# Patient Record
Sex: FEMALE | Race: WHITE | NOT HISPANIC OR LATINO | Employment: FULL TIME
[De-identification: names, ages, dates, MRNs, and addresses within clinical notes are randomized per-mention and may not be internally consistent; named-entity substitution may affect disease eponyms.]

---

## 2017-03-21 ENCOUNTER — HOSPITAL (OUTPATIENT)
Dept: OTHER | Age: 37
End: 2017-03-21
Attending: INTERNAL MEDICINE

## 2018-08-29 ENCOUNTER — HOSPITAL (OUTPATIENT)
Dept: OTHER | Age: 38
End: 2018-08-29
Attending: FAMILY MEDICINE

## 2018-08-29 LAB
FREE T3: 2.9 PG/ML (ref 2.2–4)
FREE T4: 0.8 NG/DL (ref 0.8–1.5)
THYROGLOB AB SERPL-ACNC: <0.9 UNIT/ML (ref 0–4)
THYROPEROXIDASE AB SERPL-ACNC: <28 UNIT/ML
TSH SERPL-ACNC: 0.68 MCUNIT/ML (ref 0.35–5)

## 2019-04-22 ENCOUNTER — MED REC SCAN ONLY (OUTPATIENT)
Dept: RHEUMATOLOGY | Facility: CLINIC | Age: 39
End: 2019-04-22

## 2019-04-22 ENCOUNTER — OFFICE VISIT (OUTPATIENT)
Dept: RHEUMATOLOGY | Facility: CLINIC | Age: 39
End: 2019-04-22
Payer: COMMERCIAL

## 2019-04-22 ENCOUNTER — LAB ENCOUNTER (OUTPATIENT)
Dept: LAB | Age: 39
End: 2019-04-22
Attending: INTERNAL MEDICINE
Payer: COMMERCIAL

## 2019-04-22 VITALS
DIASTOLIC BLOOD PRESSURE: 70 MMHG | HEART RATE: 82 BPM | BODY MASS INDEX: 34.04 KG/M2 | WEIGHT: 185 LBS | SYSTOLIC BLOOD PRESSURE: 112 MMHG | HEIGHT: 62 IN

## 2019-04-22 DIAGNOSIS — R21 RASH: ICD-10-CM

## 2019-04-22 DIAGNOSIS — R59.9 SWOLLEN GLAND: ICD-10-CM

## 2019-04-22 DIAGNOSIS — R21 RASH: Primary | ICD-10-CM

## 2019-04-22 DIAGNOSIS — R23.1 LIVEDO RETICULARIS: ICD-10-CM

## 2019-04-22 PROCEDURE — 86255 FLUORESCENT ANTIBODY SCREEN: CPT

## 2019-04-22 PROCEDURE — 85610 PROTHROMBIN TIME: CPT

## 2019-04-22 PROCEDURE — 86704 HEP B CORE ANTIBODY TOTAL: CPT | Performed by: INTERNAL MEDICINE

## 2019-04-22 PROCEDURE — 85025 COMPLETE CBC W/AUTO DIFF WBC: CPT | Performed by: INTERNAL MEDICINE

## 2019-04-22 PROCEDURE — 86200 CCP ANTIBODY: CPT | Performed by: INTERNAL MEDICINE

## 2019-04-22 PROCEDURE — 87340 HEPATITIS B SURFACE AG IA: CPT | Performed by: INTERNAL MEDICINE

## 2019-04-22 PROCEDURE — 86146 BETA-2 GLYCOPROTEIN ANTIBODY: CPT | Performed by: INTERNAL MEDICINE

## 2019-04-22 PROCEDURE — 87389 HIV-1 AG W/HIV-1&-2 AB AG IA: CPT | Performed by: INTERNAL MEDICINE

## 2019-04-22 PROCEDURE — 86160 COMPLEMENT ANTIGEN: CPT | Performed by: INTERNAL MEDICINE

## 2019-04-22 PROCEDURE — 85390 FIBRINOLYSINS SCREEN I&R: CPT

## 2019-04-22 PROCEDURE — 86664 EPSTEIN-BARR NUCLEAR ANTIGEN: CPT

## 2019-04-22 PROCEDURE — 84165 PROTEIN E-PHORESIS SERUM: CPT

## 2019-04-22 PROCEDURE — 85613 RUSSELL VIPER VENOM DILUTED: CPT

## 2019-04-22 PROCEDURE — 86665 EPSTEIN-BARR CAPSID VCA: CPT

## 2019-04-22 PROCEDURE — 86431 RHEUMATOID FACTOR QUANT: CPT | Performed by: INTERNAL MEDICINE

## 2019-04-22 PROCEDURE — 86803 HEPATITIS C AB TEST: CPT | Performed by: INTERNAL MEDICINE

## 2019-04-22 PROCEDURE — 36415 COLL VENOUS BLD VENIPUNCTURE: CPT

## 2019-04-22 PROCEDURE — 82595 ASSAY OF CRYOGLOBULIN: CPT

## 2019-04-22 PROCEDURE — 86706 HEP B SURFACE ANTIBODY: CPT | Performed by: INTERNAL MEDICINE

## 2019-04-22 PROCEDURE — 85598 HEXAGNAL PHOSPH PLTLT NEUTRL: CPT

## 2019-04-22 PROCEDURE — 83516 IMMUNOASSAY NONANTIBODY: CPT

## 2019-04-22 PROCEDURE — 99244 OFF/OP CNSLTJ NEW/EST MOD 40: CPT | Performed by: INTERNAL MEDICINE

## 2019-04-22 PROCEDURE — 86147 CARDIOLIPIN ANTIBODY EA IG: CPT

## 2019-04-22 PROCEDURE — 83876 ASSAY MYELOPEROXIDASE: CPT

## 2019-04-22 PROCEDURE — 86663 EPSTEIN-BARR ANTIBODY: CPT

## 2019-04-22 PROCEDURE — 80053 COMPREHEN METABOLIC PANEL: CPT | Performed by: INTERNAL MEDICINE

## 2019-04-22 PROCEDURE — 85730 THROMBOPLASTIN TIME PARTIAL: CPT

## 2019-04-22 RX ORDER — IBUPROFEN 200 MG
TABLET ORAL AS NEEDED
COMMUNITY
End: 2021-09-13 | Stop reason: ALTCHOICE

## 2019-04-22 RX ORDER — RANITIDINE 300 MG/1
TABLET ORAL
Refills: 2 | COMMUNITY
Start: 2019-02-26 | End: 2020-10-27 | Stop reason: ALTCHOICE

## 2019-04-22 NOTE — PATIENT INSTRUCTIONS
- you were seen today for rash on legs and swollen glands  - plan to get blood work to r/o anything autoimmune  - will let you know the results

## 2019-11-08 ENCOUNTER — HOSPITAL (OUTPATIENT)
Dept: OTHER | Age: 39
End: 2019-11-08
Attending: OBSTETRICS & GYNECOLOGY

## 2020-08-19 NOTE — PROGRESS NOTES
Randy Gastelum is a 46year old female who presents for Patient presents with:  Joint Pain: knees, wrists, neck  Skin: mottling of legs x 2 weeks   Pain: facial pain   .    HPI:     I had the pleasure of seeing Randy Gastelum on 4/22/2019 for evaluation of Medications:  Dextran 70-Hypromellose (ARTIFICIAL TEARS) 0.1-0.3 % Ophthalmic Solution 1 gtt. each eye as needed Disp:  Rfl:    LOPERAMIDE HCL OR Take by mouth as needed. Disp:  Rfl:    ibuprofen 200 MG Oral Tab as needed.  Disp:  Rfl:    raNITIdine HCl 300 biopsy   • OTHER SURGICAL HISTORY  8/19/14    Right ESWL- Dr. Temple Means   • OTHER SURGICAL HISTORY  9/2/14    Right ESWL- Dr. Murphy Argue   • OTHER SURGICAL HISTORY  9/8/14    [facility]      Family History   P rash on LE  MSK:  Cervical spine: FROM  Hands: no synovitis in DIP, PIP and MCP, strong full fists  Wrist: FROM, no pain or swelling or warmth on palpation  Elbow: FROM, no pain or swelling or warmth on palpation  Shoulders: FROM, no pain or swelling or wa Autoimmune workup will be done but does not appear to be an autoimmune process    Thank you for allowing me to participate in this patients care.  Pt will be notified of results, f/u in 3 months     Paras Willis MD  4/22/2019  10:47 AM [Awake] : awake [Alert] : alert [Examination Of The Breasts] : a normal appearance [No Masses] : no breast masses were palpable [No Discharge] : no discharge [Soft] : soft [Oriented x3] : oriented to person, place, and time [Labia Majora] : labia major [Normal] : clitoris [Labia Minora] : labia minora [No Bleeding] : there was no active vaginal bleeding [No Tenderness] : no rectal tenderness [Nl Sphincter Tone] : normal sphincter tone [Pap Obtained] : a Pap smear was performed [Acute Distress] : no acute distress [Mass] : no breast mass [Nipple Discharge] : no nipple discharge [Axillary LAD] : no axillary lymphadenopathy [Tender] : non tender [Distended] : not distended [Depressed Mood] : not depressed [Flat Affect] : affect not flat [Tenderness] : nontender [Occult Blood] : occult blood test from digital rectal exam was negative [Adnexa Tenderness] : were not tender

## 2020-08-25 NOTE — H&P
[facility], [facility] - Gastroenterology                                                                                                               Reason for consult: crc screening    Requesting physician or provider: [facility] 2/2 underdistention vs inflammatory process. Normal appendix.  S/p ccy    Unremarkable cbc, cmp, lipase ua in er     NSAIDS: no  Tobacco: current smoker  Alcohol: social  Illicit drugs:no    Maternal grandmother had colon ca in her 42's  No FH ibd  No FH ce CYSTOSCOPY, STENT PLACEMENT Right 8/19/2014    Performed by Lila Portillo DO at [address] [place] [address]   • HOLMIUM  LITHOTRIPSY LASER WITH CYSTOSCOPY Right 9/2/2014    Performed by Lila Portillo DO at [address] [place] [address]   • ADOLFO Taylor SWELLING  Clarithromycin          ANGIOEDEMA, SWELLING, TONGUE                            SWELLING    Comment:Tongue swells and develops sores in mouth             Tongue swells             Tongue swells and develops sores in mouth  Duloxetine AM   WBC  4.0 - 11.0 x10(3) uL 6.8    RBC  3.80 - 5.30 x10(6)uL 5.07    HGB  12.0 - 16.0 g/dL 15.2    HCT  35.0 - 48.0 % 46.1    MCV  80.0 - 100.0 fL 90.9    MCH  26.0 - 34.0 pg 30.0    MCHC  31.0 - 37.0 g/dL 33.0    RDW-SD  35.1 - 46.3 fL 46.2    RDW  11. consider underlying nonspecific inflammatory (such as Crohn's disease) and/or infectious process. 3.  Other findings, as above.   .  ASSESSMENT/PLAN:   Willow Schneider is a 45year old year-old female with history of thyroid nodule, abd pain, anxiety, cer 8/26/2020        This note was partially prepared using [employer] voice recognition dictation software. As a result, errors may occur. When identified, these errors have been corrected.  While every attempt is made to correct errors during dictation,

## 2020-08-26 ENCOUNTER — OFFICE VISIT (OUTPATIENT)
Dept: GASTROENTEROLOGY | Facility: CLINIC | Age: 40
End: 2020-08-26
Payer: COMMERCIAL

## 2020-08-26 ENCOUNTER — LAB ENCOUNTER (OUTPATIENT)
Dept: LAB | Facility: HOSPITAL | Age: 40
End: 2020-08-26
Attending: NURSE PRACTITIONER
Payer: COMMERCIAL

## 2020-08-26 VITALS
SYSTOLIC BLOOD PRESSURE: 114 MMHG | HEART RATE: 88 BPM | DIASTOLIC BLOOD PRESSURE: 86 MMHG | WEIGHT: 186 LBS | BODY MASS INDEX: 34 KG/M2

## 2020-08-26 DIAGNOSIS — R10.84 GENERALIZED ABDOMINAL PAIN: ICD-10-CM

## 2020-08-26 DIAGNOSIS — R93.5 ABNORMAL CT OF THE ABDOMEN: ICD-10-CM

## 2020-08-26 DIAGNOSIS — Z86.010 HISTORY OF COLON POLYPS: ICD-10-CM

## 2020-08-26 DIAGNOSIS — K21.9 GASTROESOPHAGEAL REFLUX DISEASE, ESOPHAGITIS PRESENCE NOT SPECIFIED: ICD-10-CM

## 2020-08-26 DIAGNOSIS — R10.84 GENERALIZED ABDOMINAL PAIN: Primary | ICD-10-CM

## 2020-08-26 LAB
CRP SERPL-MCNC: 0.37 MG/DL (ref ?–0.3)
ERYTHROCYTE [SEDIMENTATION RATE] IN BLOOD: 7 MM/HR (ref 0–20)
VIT B12 SERPL-MCNC: 384 PG/ML (ref 193–986)

## 2020-08-26 PROCEDURE — 3079F DIAST BP 80-89 MM HG: CPT | Performed by: NURSE PRACTITIONER

## 2020-08-26 PROCEDURE — S0285 CNSLT BEFORE SCREEN COLONOSC: HCPCS | Performed by: NURSE PRACTITIONER

## 2020-08-26 PROCEDURE — 82607 VITAMIN B-12: CPT

## 2020-08-26 PROCEDURE — 82306 VITAMIN D 25 HYDROXY: CPT

## 2020-08-26 PROCEDURE — 86140 C-REACTIVE PROTEIN: CPT

## 2020-08-26 PROCEDURE — 36415 COLL VENOUS BLD VENIPUNCTURE: CPT

## 2020-08-26 PROCEDURE — 3074F SYST BP LT 130 MM HG: CPT | Performed by: NURSE PRACTITIONER

## 2020-08-26 PROCEDURE — 85652 RBC SED RATE AUTOMATED: CPT

## 2020-08-26 RX ORDER — HYOSCYAMINE SULFATE 0.12 MG/5ML
5 LIQUID ORAL 3 TIMES DAILY PRN
COMMUNITY
End: 2020-09-16

## 2020-08-26 RX ORDER — RABEPRAZOLE SODIUM 20 MG/1
20 TABLET, DELAYED RELEASE ORAL DAILY
COMMUNITY
Start: 2020-08-12 | End: 2020-10-27 | Stop reason: ALTCHOICE

## 2020-08-26 RX ORDER — ALBUTEROL SULFATE 90 UG/1
2 AEROSOL, METERED RESPIRATORY (INHALATION) EVERY 6 HOURS PRN
COMMUNITY
Start: 2020-01-13

## 2020-08-26 NOTE — PATIENT INSTRUCTIONS
-request colonoscopy and egd from [facility]  -continue rabeprazole 20 mg  -start miralax one capful daily and squatty potty  -labs  -report to er if condition decline

## 2020-08-28 LAB — 25(OH)D3 SERPL-MCNC: 32.5 NG/ML (ref 30–100)

## 2020-09-13 NOTE — PROGRESS NOTES
[facility], [facility] - Gastroenterology                                                                                                               Reason for consult: f/u    Requesting physician or provider: [facility] Encounters:  08/26/20 : 186 lb (84.4 kg)  04/22/19 : 185 lb (83.9 kg)  09/15/14 : 184 lb (83.5 kg)  09/02/14 : 188 lb 6.4 oz (85.5 kg)  08/19/14 : 190 lb (86.2 kg)  08/12/14 : 200 lb (90.7 kg)       History, Medications, Allergies, ROS:      Past Medical H Removal- Dr. Ashley Mcarthur   • TONSILLECTOMY  1990      Family Hx:   Family History   Problem Relation Age of Onset   • Lipids Mother    • Heart Disorder Mother    • Hypertension Mother    • Diabetes Mother       Social History: Social History    Tobacco Use OTHER (SEE COMMENTS), RASH    Comment:Skin ulcer. Skin ulcer. Skin ulcer.   Omeprazole              OTHER (SEE COMMENTS)    Comment:headache  Pantoprazole            OTHER (SEE COMMENTS)  Nickel                  RASH    Comment pain  #h/o colon polyps  #gerd  #dysphagia  She is here today for f/u. Labs w/ mild elevation in crp, normal esr, normal b12. Today is slightly improved since last visit. Cln/EGD 2/2020 requested from [facility], but not yet received.   Recommend she contin

## 2020-09-16 ENCOUNTER — PATIENT MESSAGE (OUTPATIENT)
Dept: GASTROENTEROLOGY | Facility: CLINIC | Age: 40
End: 2020-09-16

## 2020-09-16 ENCOUNTER — TELEPHONE (OUTPATIENT)
Dept: GASTROENTEROLOGY | Facility: CLINIC | Age: 40
End: 2020-09-16

## 2020-09-16 ENCOUNTER — OFFICE VISIT (OUTPATIENT)
Dept: GASTROENTEROLOGY | Facility: CLINIC | Age: 40
End: 2020-09-16
Payer: COMMERCIAL

## 2020-09-16 VITALS
WEIGHT: 185.63 LBS | BODY MASS INDEX: 34.16 KG/M2 | HEART RATE: 78 BPM | HEIGHT: 62 IN | SYSTOLIC BLOOD PRESSURE: 111 MMHG | DIASTOLIC BLOOD PRESSURE: 76 MMHG

## 2020-09-16 DIAGNOSIS — Z86.010 HISTORY OF COLON POLYPS: ICD-10-CM

## 2020-09-16 DIAGNOSIS — R10.84 GENERALIZED ABDOMINAL PAIN: ICD-10-CM

## 2020-09-16 DIAGNOSIS — K21.9 GASTROESOPHAGEAL REFLUX DISEASE, ESOPHAGITIS PRESENCE NOT SPECIFIED: Primary | ICD-10-CM

## 2020-09-16 DIAGNOSIS — R13.10 DYSPHAGIA, UNSPECIFIED TYPE: ICD-10-CM

## 2020-09-16 PROCEDURE — 3078F DIAST BP <80 MM HG: CPT | Performed by: NURSE PRACTITIONER

## 2020-09-16 PROCEDURE — 3008F BODY MASS INDEX DOCD: CPT | Performed by: NURSE PRACTITIONER

## 2020-09-16 PROCEDURE — 99214 OFFICE O/P EST MOD 30 MIN: CPT | Performed by: NURSE PRACTITIONER

## 2020-09-16 PROCEDURE — 3074F SYST BP LT 130 MM HG: CPT | Performed by: NURSE PRACTITIONER

## 2020-09-16 NOTE — PATIENT INSTRUCTIONS
-stop smoking  -aciphex once daily  -miralax once daily with squatty potty  -restart levsin  -swallow study    Request colonoscopy and egd from [facility]

## 2020-09-17 NOTE — TELEPHONE ENCOUNTER
From: Kayla Liao  To: Zohreh Barba  Sent: 9/16/2020 7:05 PM CDT  Subject: Test Results Question    I was able to find the pathology results from my colonoscopy after logging into several different my carts I found it.     I am attaching it,

## 2020-10-13 ENCOUNTER — APPOINTMENT (OUTPATIENT)
Dept: LAB | Age: 40
End: 2020-10-13
Attending: NURSE PRACTITIONER
Payer: COMMERCIAL

## 2020-10-13 DIAGNOSIS — R13.10 DYSPHAGIA, UNSPECIFIED TYPE: ICD-10-CM

## 2020-10-16 ENCOUNTER — HOSPITAL ENCOUNTER (OUTPATIENT)
Dept: GENERAL RADIOLOGY | Facility: HOSPITAL | Age: 40
Discharge: HOME | End: 2020-10-16
Attending: NURSE PRACTITIONER
Payer: COMMERCIAL

## 2020-10-16 DIAGNOSIS — R13.10 DYSPHAGIA, UNSPECIFIED TYPE: ICD-10-CM

## 2020-10-16 PROCEDURE — 92611 MOTION FLUOROSCOPY/SWALLOW: CPT

## 2020-10-16 PROCEDURE — 74230 X-RAY XM SWLNG FUNCJ C+: CPT | Performed by: NURSE PRACTITIONER

## 2020-10-16 NOTE — PROGRESS NOTES
ADULT VIDEOFLUOROSCOPIC SWALLOWING STUDY       ADULT VIDEOFLUOROSCOPIC SWALLOWING STUDY:   Referring Physician: Cliff Garcia      Radiologist: Dr. Gosia Carrillo  Diagnosis: dysphagia    Date of Service: 10/16/2020     PATIENT SUMMARY   Chief Complaint: The patien in lower esophagus and was not cleared with multiple drinks of water. Puree was presented which cleared the barium tablet through the esophagus. Penetration Aspiration Scale: 1/8, No material entered the airway.     Overall Impression: Normal oral and

## 2020-10-19 ENCOUNTER — TELEPHONE (OUTPATIENT)
Dept: GASTROENTEROLOGY | Facility: CLINIC | Age: 40
End: 2020-10-19

## 2020-10-19 DIAGNOSIS — R13.19 ESOPHAGEAL DYSPHAGIA: Primary | ICD-10-CM

## 2020-10-19 NOTE — TELEPHONE ENCOUNTER
Pt contacted after having vfss-testing remarkable for the barium pill retained in the lower esophagus. recommendation for complete esophagram if indicated. CLN/EGD requested from [facility] and not yet received.     Do recommend esophagram pending review of eg

## 2020-10-23 NOTE — TELEPHONE ENCOUNTER
Received fax from [facility] saying no records for pt there of procedures being done

## 2020-10-26 NOTE — TELEPHONE ENCOUNTER
Contacted pt to discuss results of vfss. Dysphagia has been on-going issue and preceded last egd at [facility]. Plan for barium study and request egd from [facility]. Pending results/records review consider next steps.

## 2020-10-26 NOTE — TELEPHONE ENCOUNTER
Shauna QUINTANA, I spoke to patient and reviewed below, she would like a call to discuss both tests--she is off today and at [phone].  She had her egd/colon done 2/10/2020 in Dr Sims Courser office, ph [phone]-- I spoke to Emelia Fuchs in their office and

## 2020-10-26 NOTE — TELEPHONE ENCOUNTER
Records not found from cln/egd reportedly done at [facility].    Attempted to contact 3rd time to discuss vfss findings and recommendations:  Confirm where cln/egd completed-if patient had different last name at that time?   Plan for esophagram givne vfss fin

## 2020-10-27 ENCOUNTER — TELEPHONE (OUTPATIENT)
Dept: GASTROENTEROLOGY | Facility: CLINIC | Age: 40
End: 2020-10-27

## 2020-10-27 RX ORDER — RABEPRAZOLE SODIUM 20 MG/1
20 TABLET, DELAYED RELEASE ORAL 2 TIMES DAILY
Qty: 60 TABLET | Refills: 3 | Status: SHIPPED | OUTPATIENT
Start: 2020-10-27 | End: 2021-06-20

## 2020-10-27 NOTE — TELEPHONE ENCOUNTER
Patient transferred from phone room. I reviewed below, she had already spoken to Hussein QUINTANA. She will schedule her x-ray on [facility]. Informed a recall letter will be mailed to her in 3 yrs, but to also emmie her calendar for colonoscopy 2/10/2023.  [facility]

## 2020-10-27 NOTE — TELEPHONE ENCOUNTER
Received egd/colon operative and path reports from 2/10/2020 by Dr Thony Haider, and placed on Yung Haile APDIPESH desk. Thanks. May close encounter when done.

## 2020-10-27 NOTE — TELEPHONE ENCOUNTER
Recall colon in 3 years per Sheeba QUINTANA--schedule with Dr Arvind Yuen or Dr Umu Gordillo. Colon done 2/10/2020 at outside facility. Next due 2/10/2023. Entered in epic. Health maintenance updated.    This recall is being entered in separate 10/27 encounter,as message c

## 2020-10-27 NOTE — TELEPHONE ENCOUNTER
Nursing:  Please enter cln recall for 3 years for polyp surveillance (2/2023) Dr. Taiwo Washburn or Dr. Lg Espinoza

## 2020-10-27 NOTE — TELEPHONE ENCOUNTER
See other 10/19 encounter sent from [address] [place] [address]. Entering 3 yr colon recall in this encounter. Also left message to call back. Recall colon in 3 years per Marino QUINTANA--schedule with Dr Ralph Peña or Dr Saqib Otto. Colon done 2/10/2020 at outside facility.  Next

## 2020-10-27 NOTE — TELEPHONE ENCOUNTER
Cln[facility]egd done at [facility] Dr. Hendrix Cassette 2/10/20:  procedure with conscious sedation  Indication: diarrhea, abd pain poorly responsive to treatment    Colon insertion to cecum    egd w/ possible minimal redness of the tps of some small bowel folds  S

## 2020-10-27 NOTE — TELEPHONE ENCOUNTER
No mention of stricturing, web on egd to explain reported dysphagia, small bowel biopsy normal.  No esophageal biopsy taken. Plan for esophagogram as previously recommended and determine need for further testing pending results.  She is currently on acip

## 2020-11-25 ENCOUNTER — TELEPHONE (OUTPATIENT)
Dept: GASTROENTEROLOGY | Facility: CLINIC | Age: 40
End: 2020-11-25

## 2020-12-04 ENCOUNTER — APPOINTMENT (OUTPATIENT)
Dept: LAB | Age: 40
End: 2020-12-04
Attending: NURSE PRACTITIONER
Payer: COMMERCIAL

## 2020-12-04 DIAGNOSIS — R13.19 ESOPHAGEAL DYSPHAGIA: ICD-10-CM

## 2020-12-07 ENCOUNTER — HOSPITAL ENCOUNTER (OUTPATIENT)
Dept: GENERAL RADIOLOGY | Facility: HOSPITAL | Age: 40
Discharge: HOME | End: 2020-12-07
Attending: NURSE PRACTITIONER
Payer: COMMERCIAL

## 2020-12-07 DIAGNOSIS — R13.19 ESOPHAGEAL DYSPHAGIA: ICD-10-CM

## 2020-12-07 PROCEDURE — 74246 X-RAY XM UPR GI TRC 2CNTRST: CPT | Performed by: NURSE PRACTITIONER

## 2021-04-01 ENCOUNTER — OFFICE VISIT (OUTPATIENT)
Dept: RHEUMATOLOGY | Facility: CLINIC | Age: 41
End: 2021-04-01
Payer: COMMERCIAL

## 2021-04-01 VITALS
BODY MASS INDEX: 36.62 KG/M2 | HEART RATE: 94 BPM | DIASTOLIC BLOOD PRESSURE: 88 MMHG | SYSTOLIC BLOOD PRESSURE: 128 MMHG | WEIGHT: 199 LBS | HEIGHT: 62 IN

## 2021-04-01 DIAGNOSIS — R59.9 SWOLLEN GLAND: Primary | ICD-10-CM

## 2021-04-01 DIAGNOSIS — R23.1 LIVEDO RETICULARIS: ICD-10-CM

## 2021-04-01 PROCEDURE — 3079F DIAST BP 80-89 MM HG: CPT | Performed by: INTERNAL MEDICINE

## 2021-04-01 PROCEDURE — 3008F BODY MASS INDEX DOCD: CPT | Performed by: INTERNAL MEDICINE

## 2021-04-01 PROCEDURE — 99213 OFFICE O/P EST LOW 20 MIN: CPT | Performed by: INTERNAL MEDICINE

## 2021-04-01 PROCEDURE — 3074F SYST BP LT 130 MM HG: CPT | Performed by: INTERNAL MEDICINE

## 2021-04-01 NOTE — PATIENT INSTRUCTIONS
You were seen today for facial pain and glands that feel enlarged  Lets get some repeat blood work   For now try taking tylenol arthritis 650 mg 1-3 times   Will let you know the results

## 2021-04-01 NOTE — PROGRESS NOTES
Belen Ro is a 37year old female. HPI:   Patient presents with: Follow - Up      I had the pleasure of seeing Belen Ro on 4/1/2021 for follow up facial pain and swelling and livido reticularis.      Current Medications:  Tylenol as needed  B autoimmune standpoint was all negative. She was doing well with no facial pain or swelling and even her rash went away. After her Covid vaccine on February 8 she started to notice facial pain again with swelling, chest pain and mouth pain.   She also repo ANGIOEDEMA, SWELLING  Biaxin [Clarithromy*    TONGUE SWELLING  Clarithromycin          ANGIOEDEMA, SWELLING, TONGUE                            SWELLING    Comment:Tongue swells and develops sores in mouth             Tongue swells             Tongue swells sensation intact. PSYCH: normal mood       LABS:     Component      Latest Ref Rng & Units 4/22/2019   Lupus Anticoag Screen Interp       Conclusion: Negative .  . .   PT      11.8 - 14.5 seconds 13.2   APTT      23.2 - 35.3 seconds 30.9   Hexagonal Phase with CRISTELA panel but again suspicion is low  - Would recommend to follow with her PCP for further evaluation    Livedo reticularis  - This is likely due to her smoking.   Work-up for autoimmune disease was negative which included lupus and antiphospholipid sy

## 2021-04-02 ENCOUNTER — LAB ENCOUNTER (OUTPATIENT)
Dept: LAB | Age: 41
End: 2021-04-02
Attending: INTERNAL MEDICINE
Payer: COMMERCIAL

## 2021-04-02 DIAGNOSIS — R23.1 LIVEDO RETICULARIS: ICD-10-CM

## 2021-04-02 DIAGNOSIS — R59.9 SWOLLEN GLAND: ICD-10-CM

## 2021-04-02 PROCEDURE — 86235 NUCLEAR ANTIGEN ANTIBODY: CPT

## 2021-04-02 PROCEDURE — 86225 DNA ANTIBODY NATIVE: CPT

## 2021-04-02 PROCEDURE — 86038 ANTINUCLEAR ANTIBODIES: CPT

## 2021-04-02 PROCEDURE — 36415 COLL VENOUS BLD VENIPUNCTURE: CPT

## 2021-04-10 ENCOUNTER — LAB ENCOUNTER (OUTPATIENT)
Dept: LAB | Age: 41
End: 2021-04-10
Attending: INTERNAL MEDICINE
Payer: COMMERCIAL

## 2021-04-10 DIAGNOSIS — R23.1 LIVEDO RETICULARIS: ICD-10-CM

## 2021-04-10 DIAGNOSIS — R59.9 SWOLLEN GLAND: ICD-10-CM

## 2021-04-10 PROCEDURE — 85025 COMPLETE CBC W/AUTO DIFF WBC: CPT | Performed by: INTERNAL MEDICINE

## 2021-04-10 PROCEDURE — 80053 COMPREHEN METABOLIC PANEL: CPT | Performed by: INTERNAL MEDICINE

## 2021-04-10 PROCEDURE — 86160 COMPLEMENT ANTIGEN: CPT | Performed by: INTERNAL MEDICINE

## 2021-04-10 PROCEDURE — 82550 ASSAY OF CK (CPK): CPT

## 2021-04-10 PROCEDURE — 36415 COLL VENOUS BLD VENIPUNCTURE: CPT | Performed by: INTERNAL MEDICINE

## 2021-04-13 ENCOUNTER — TELEPHONE (OUTPATIENT)
Dept: RHEUMATOLOGY | Facility: CLINIC | Age: 41
End: 2021-04-13

## 2021-04-13 RX ORDER — HYDROXYCHLOROQUINE SULFATE 200 MG/1
400 TABLET, FILM COATED ORAL DAILY
Qty: 60 TABLET | Refills: 2 | Status: SHIPPED | OUTPATIENT
Start: 2021-04-13 | End: 2021-09-13 | Stop reason: ALTCHOICE

## 2021-04-13 NOTE — TELEPHONE ENCOUNTER
Patient states she received a call from Dr. Boogie Enriquez recommending her to start her on a new medication. Patient states her her right knee is hurting and is having a hard time. She is feeling week from her hands, tight and campy. Patient would like to give it a try on new medication. Please advise.

## 2021-04-13 NOTE — TELEPHONE ENCOUNTER
Dr. Angel Campuzano, patient responding to lab result note 4/10/21. She'd like to try hydroxychloroquine.

## 2021-04-14 RX ORDER — METHYLPREDNISOLONE 4 MG/1
TABLET ORAL
Qty: 1 PACKAGE | Refills: 0 | Status: SHIPPED | OUTPATIENT
Start: 2021-04-14 | End: 2021-09-13 | Stop reason: ALTCHOICE

## 2021-04-14 NOTE — TELEPHONE ENCOUNTER
Spoke with patient and she verbalizes understanding. Patient reports mouth sores keep coming back. She's not sure if it's related to lupus or who should treat her for the mouth sores? Please advise.

## 2021-04-14 NOTE — TELEPHONE ENCOUNTER
I sent the prescription for Plaquenil. Advised patient to take 1 pill weekly for 2 weeks and then increase to 2 pills weekly. If you can let her know that the side effects includes GI symptoms, rash and even vision issues. She will need to see an ophthalmologist within 6 months of starting this medication.

## 2021-04-14 NOTE — TELEPHONE ENCOUNTER
Left voicemail for pt to take Plaquenil 1 tab daily increasing to 2 tabs daily . And reviewed provider recommendations .

## 2021-04-14 NOTE — TELEPHONE ENCOUNTER
Spoke to pt  Reviewed provider  instruction . Pt reports has completed medrol dose pack form PCP for joint pain and oral sores. Now with worsening symptoms . Pt asking Rheum for another dose pack .  Plaquenil daily then BID     Please advise

## 2021-04-27 ENCOUNTER — TELEPHONE (OUTPATIENT)
Dept: RHEUMATOLOGY | Facility: CLINIC | Age: 41
End: 2021-04-27

## 2021-04-27 NOTE — TELEPHONE ENCOUNTER
Patient stated she broke out in a rash on her face and has knee pain. Patient is unsure she may be having a reaction to the medication Dr. Sj Hull has prescribed her. Please advise.     Hydroxychloroquine Sulfate 200 MG Oral Tab 60 tablet 2 4/13/2021    Sig

## 2021-04-27 NOTE — TELEPHONE ENCOUNTER
Spoke to patient who reports a few small bumps/rash on her face with redness since yesterday. No trouble swallowing or breathing. Patient started 1 tablet hydroxychloriquine daily on Monday.      Patient was advised to hold the medicine and to take the OTC

## 2021-04-28 NOTE — TELEPHONE ENCOUNTER
Talked to patient. Advised to stop the Plaquenil. Regarding an autoimmune process she was only found to have a positive Smith but negative EVIN and other work-up for autoimmune diseases were negative.   Could be undifferentiated connective tissue disease b

## 2021-05-12 ENCOUNTER — LAB REQUISITION (OUTPATIENT)
Dept: LAB | Age: 41
End: 2021-05-12

## 2021-05-12 DIAGNOSIS — Z12.4 ENCOUNTER FOR SCREENING FOR MALIGNANT NEOPLASM OF CERVIX: ICD-10-CM

## 2021-05-12 PROCEDURE — 87624 HPV HI-RISK TYP POOLED RSLT: CPT | Performed by: CLINICAL MEDICAL LABORATORY

## 2021-05-12 PROCEDURE — 88175 CYTOPATH C/V AUTO FLUID REDO: CPT | Performed by: CLINICAL MEDICAL LABORATORY

## 2021-05-13 ENCOUNTER — PATIENT MESSAGE (OUTPATIENT)
Dept: RHEUMATOLOGY | Facility: CLINIC | Age: 41
End: 2021-05-13

## 2021-05-14 LAB — HOLD SPECIMEN: NORMAL

## 2021-05-14 NOTE — TELEPHONE ENCOUNTER
From: Isabelle Swift  To: Gali Hodges MD  Sent: 5/13/2021 6:47 PM CDT  Subject: Visit Follow-up Question    Just wanted to let you know that I am doing ok, off the plaquenil still, the face rash has resolved , and the joint and muscles are less painful.  s

## 2021-05-14 NOTE — TELEPHONE ENCOUNTER
Noted, will await pt response regarding symptom update. Message has been left and mychart message sent.

## 2021-05-14 NOTE — TELEPHONE ENCOUNTER
Dr. Galina Ahn, this is Dr. Jaxon Esposito patient do you suggest anything different? Left message to check MyChart. Patient is informed to let primary care know of his ongoing chest pain and if it is worse to visit ED.

## 2021-05-20 LAB
CASE RPRT: NORMAL
CYTOLOGY CVX/VAG STUDY: NORMAL
PAP EDUCATIONAL NOTE: NORMAL
SPECIMEN ADEQUACY: NORMAL

## 2021-05-21 ENCOUNTER — HOSPITAL ENCOUNTER (OUTPATIENT)
Dept: MAMMOGRAPHY | Age: 41
Discharge: HOME | End: 2021-05-21
Attending: OBSTETRICS & GYNECOLOGY
Payer: COMMERCIAL

## 2021-05-21 DIAGNOSIS — Z12.31 ENCOUNTER FOR SCREENING MAMMOGRAM FOR MALIGNANT NEOPLASM OF BREAST: ICD-10-CM

## 2021-05-21 PROCEDURE — 77067 SCR MAMMO BI INCL CAD: CPT | Performed by: OBSTETRICS & GYNECOLOGY

## 2021-05-21 PROCEDURE — 77063 BREAST TOMOSYNTHESIS BI: CPT | Performed by: OBSTETRICS & GYNECOLOGY

## 2021-05-26 ENCOUNTER — LAB REQUISITION (OUTPATIENT)
Dept: LAB | Age: 41
End: 2021-05-26

## 2021-05-26 DIAGNOSIS — N88.9 NONINFLAMMATORY DISORDER OF CERVIX UTERI, UNSPECIFIED: ICD-10-CM

## 2021-05-26 PROCEDURE — 88305 TISSUE EXAM BY PATHOLOGIST: CPT | Performed by: CLINICAL MEDICAL LABORATORY

## 2021-05-27 LAB
HPV16+18+45 E6+E7MRNA CVX NAA+PROBE: NEGATIVE
Lab: NORMAL

## 2021-05-28 LAB
ASR DISCLAIMER: NORMAL
CASE RPRT: NORMAL
CLINICAL INFO: NORMAL
PATH REPORT.FINAL DX SPEC: NORMAL
PATH REPORT.GROSS SPEC: NORMAL

## 2021-06-03 ENCOUNTER — HOSPITAL ENCOUNTER (OUTPATIENT)
Dept: MAMMOGRAPHY | Facility: HOSPITAL | Age: 41
Discharge: HOME | End: 2021-06-03
Attending: OBSTETRICS & GYNECOLOGY
Payer: COMMERCIAL

## 2021-06-03 DIAGNOSIS — R92.8 ABNORMAL MAMMOGRAM: ICD-10-CM

## 2021-06-03 PROCEDURE — 77061 BREAST TOMOSYNTHESIS UNI: CPT | Performed by: OBSTETRICS & GYNECOLOGY

## 2021-06-03 PROCEDURE — 77065 DX MAMMO INCL CAD UNI: CPT | Performed by: OBSTETRICS & GYNECOLOGY

## 2021-06-17 NOTE — TELEPHONE ENCOUNTER
Requested Prescriptions     Pending Prescriptions Disp Refills   • RABEPRAZOLE SODIUM 20 MG Oral Tab EC [Pharmacy Med Name: Rabeprazole Sodium Ec 20 Mg Tab Krem] 60 tablet 0     Sig: Take 1 tablet (20 mg total) by mouth 2 (two) times a day.        LOV:   09

## 2021-06-20 RX ORDER — RABEPRAZOLE SODIUM 20 MG/1
20 TABLET, DELAYED RELEASE ORAL 2 TIMES DAILY
Qty: 60 TABLET | Refills: 0 | Status: SHIPPED | OUTPATIENT
Start: 2021-06-20 | End: 2021-07-20

## 2021-07-20 RX ORDER — RABEPRAZOLE SODIUM 20 MG/1
20 TABLET, DELAYED RELEASE ORAL 2 TIMES DAILY
Qty: 60 TABLET | Refills: 0 | Status: SHIPPED | OUTPATIENT
Start: 2021-07-20 | End: 2021-08-16

## 2021-08-08 NOTE — PROGRESS NOTES
[address] [facility] - Gastroenterology                                                                                                               Reason for consult: f/u    Requesting physician or provider: [facility] around her period. Uses imodium if symptoms had and uses levbid w/ improvement. she has occasional brbpr w/ bm attributed to hemorrhoids and improves with prep h. Has mucus at times. No melena.     she denies acid reflux and/or heartburn while on aciphe • Kidney disease    • Migraines    • OBESITY    • PONV (postoperative nausea and vomiting)    • Thyroid nodule 2016   • Ulcer       Past Surgical History:   Procedure Laterality Date   •    and    • CHOLECYSTECTOMY     • Carmella Ramos Medications (Active prior to today's visit):  Current Outpatient Medications   Medication Sig Dispense Refill   • RABEPRAZOLE SODIUM 20 MG Oral Tab EC Take 1 tablet (20 mg total) by mouth 2 (two) times a day.  60 tablet 0   • methylPREDNISolone 4 MG Ora ulcer.  Omeprazole              OTHER (SEE COMMENTS)    Comment:headache  Pantoprazole            OTHER (SEE COMMENTS)  Nickel                  RASH    Comment:Also reaction to aluminum- enviornmental    ROS:   CONSTITUTIONAL: negative for fevers, chills, 12/07/2020 at 9:31 AM        VFSS 10/2020:  Overall Impression: Normal oral and pharyngeal phases of the swallow. A barium pill was retained in the lower esophagus. Please see radiologist's note for further information.   Radiologist recommended complete histolytica ab.     -cln 2023 unless otherwise indicated  -continue aciphex  -continue hyoscyamine as needed  -start trial of fiber  -er if condition decline  -see primary care  -mri liver  -labs    Orders This Visit:  Orders Placed This Encounter      E H

## 2021-08-09 ENCOUNTER — OFFICE VISIT (OUTPATIENT)
Dept: GASTROENTEROLOGY | Facility: CLINIC | Age: 41
End: 2021-08-09
Payer: COMMERCIAL

## 2021-08-09 VITALS
HEART RATE: 82 BPM | TEMPERATURE: 99 F | DIASTOLIC BLOOD PRESSURE: 73 MMHG | SYSTOLIC BLOOD PRESSURE: 113 MMHG | BODY MASS INDEX: 36.62 KG/M2 | WEIGHT: 199 LBS | HEIGHT: 62 IN

## 2021-08-09 DIAGNOSIS — K21.9 GASTROESOPHAGEAL REFLUX DISEASE, UNSPECIFIED WHETHER ESOPHAGITIS PRESENT: ICD-10-CM

## 2021-08-09 DIAGNOSIS — K76.89 LIVER CYST: ICD-10-CM

## 2021-08-09 DIAGNOSIS — R10.31 RIGHT LOWER QUADRANT ABDOMINAL PAIN: Primary | ICD-10-CM

## 2021-08-09 DIAGNOSIS — R19.7 DIARRHEA, UNSPECIFIED TYPE: ICD-10-CM

## 2021-08-09 DIAGNOSIS — Z12.11 COLON CANCER SCREENING: ICD-10-CM

## 2021-08-09 PROCEDURE — 3008F BODY MASS INDEX DOCD: CPT | Performed by: NURSE PRACTITIONER

## 2021-08-09 PROCEDURE — 99214 OFFICE O/P EST MOD 30 MIN: CPT | Performed by: NURSE PRACTITIONER

## 2021-08-09 PROCEDURE — 3078F DIAST BP <80 MM HG: CPT | Performed by: NURSE PRACTITIONER

## 2021-08-09 PROCEDURE — 3074F SYST BP LT 130 MM HG: CPT | Performed by: NURSE PRACTITIONER

## 2021-08-09 NOTE — PATIENT INSTRUCTIONS
-cln 2023 unless otherwise indicated  -continue aciphex  -continue hyoscyamine as needed  -start trial of fiber  -er if condition decline  -see primary care  -mri liver  -labs

## 2021-08-11 ENCOUNTER — LAB ENCOUNTER (OUTPATIENT)
Dept: LAB | Age: 41
End: 2021-08-11
Attending: NURSE PRACTITIONER
Payer: COMMERCIAL

## 2021-08-11 DIAGNOSIS — K76.89 LIVER CYST: ICD-10-CM

## 2021-08-11 PROCEDURE — 86753 PROTOZOA ANTIBODY NOS: CPT

## 2021-08-11 PROCEDURE — 36415 COLL VENOUS BLD VENIPUNCTURE: CPT

## 2021-08-14 LAB — Lab: 2 U

## 2021-08-16 RX ORDER — RABEPRAZOLE SODIUM 20 MG/1
20 TABLET, DELAYED RELEASE ORAL 2 TIMES DAILY
Qty: 60 TABLET | Refills: 0 | Status: SHIPPED | OUTPATIENT
Start: 2021-08-16

## 2021-08-25 ENCOUNTER — HOSPITAL ENCOUNTER (OUTPATIENT)
Dept: MRI IMAGING | Age: 41
Discharge: HOME | End: 2021-08-25
Attending: NURSE PRACTITIONER
Payer: COMMERCIAL

## 2021-08-25 DIAGNOSIS — K76.89 LIVER CYST: ICD-10-CM

## 2021-08-25 PROCEDURE — 74183 MRI ABD W/O CNTR FLWD CNTR: CPT | Performed by: NURSE PRACTITIONER

## 2021-08-25 PROCEDURE — A9575 INJ GADOTERATE MEGLUMI 0.1ML: HCPCS | Performed by: NURSE PRACTITIONER

## 2021-09-10 ENCOUNTER — APPOINTMENT (OUTPATIENT)
Dept: CT IMAGING | Age: 41
End: 2021-09-10
Attending: EMERGENCY MEDICINE
Payer: COMMERCIAL

## 2021-09-10 ENCOUNTER — PATIENT MESSAGE (OUTPATIENT)
Dept: GASTROENTEROLOGY | Facility: CLINIC | Age: 41
End: 2021-09-10

## 2021-09-10 ENCOUNTER — HOSPITAL ENCOUNTER (OUTPATIENT)
Age: 41
Discharge: HOME | End: 2021-09-10
Payer: COMMERCIAL

## 2021-09-10 VITALS
RESPIRATION RATE: 18 BRPM | TEMPERATURE: 99 F | DIASTOLIC BLOOD PRESSURE: 85 MMHG | OXYGEN SATURATION: 100 % | HEART RATE: 82 BPM | SYSTOLIC BLOOD PRESSURE: 128 MMHG

## 2021-09-10 DIAGNOSIS — K58.9 IRRITABLE BOWEL SYNDROME WITHOUT DIARRHEA: ICD-10-CM

## 2021-09-10 DIAGNOSIS — N20.0 RENAL STONE: ICD-10-CM

## 2021-09-10 DIAGNOSIS — R10.30 LOWER ABDOMINAL PAIN: Primary | ICD-10-CM

## 2021-09-10 LAB
#MXD IC: 0.6 X10ˆ3/UL (ref 0.1–1)
B-HCG UR QL: NEGATIVE
BILIRUB UR QL STRIP: NEGATIVE
BUN BLD-MCNC: 13 MG/DL (ref 7–18)
CHLORIDE BLD-SCNC: 102 MMOL/L (ref 98–112)
CLARITY UR: CLEAR
CO2 BLD-SCNC: 24 MMOL/L (ref 21–32)
COLOR UR: YELLOW
CREAT BLD-MCNC: 0.7 MG/DL
GLUCOSE BLD-MCNC: 77 MG/DL (ref 70–99)
GLUCOSE UR STRIP-MCNC: NEGATIVE MG/DL
HCT VFR BLD AUTO: 42.8 %
HCT VFR BLD CALC: 44 %
HGB BLD-MCNC: 14.1 G/DL
ISTAT IONIZED CALCIUM FOR CHEM 8: 1.2 MMOL/L (ref 1.12–1.32)
KETONES UR STRIP-MCNC: NEGATIVE MG/DL
LEUKOCYTE ESTERASE UR QL STRIP: NEGATIVE
LYMPHOCYTES # BLD AUTO: 2.4 X10ˆ3/UL (ref 1–4)
LYMPHOCYTES NFR BLD AUTO: 38 %
MCH RBC QN AUTO: 29.5 PG (ref 26–34)
MCHC RBC AUTO-ENTMCNC: 32.9 G/DL (ref 31–37)
MCV RBC AUTO: 89.5 FL (ref 80–100)
MIXED CELL %: 9.1 %
NEUTROPHILS # BLD AUTO: 3.4 X10ˆ3/UL (ref 1.5–7.7)
NEUTROPHILS NFR BLD AUTO: 52.9 %
NITRITE UR QL STRIP: NEGATIVE
PH UR STRIP: 6.5 [PH]
PLATELET # BLD AUTO: 280 X10ˆ3/UL (ref 150–450)
POTASSIUM BLD-SCNC: 3.9 MMOL/L (ref 3.6–5.1)
PROT UR STRIP-MCNC: NEGATIVE MG/DL
RBC # BLD AUTO: 4.78 X10ˆ6/UL
SODIUM BLD-SCNC: 139 MMOL/L (ref 136–145)
SP GR UR STRIP: 1.01
UROBILINOGEN UR STRIP-ACNC: <2 MG/DL
WBC # BLD AUTO: 6.4 X10ˆ3/UL (ref 4–11)

## 2021-09-10 PROCEDURE — 87086 URINE CULTURE/COLONY COUNT: CPT | Performed by: EMERGENCY MEDICINE

## 2021-09-10 PROCEDURE — 96360 HYDRATION IV INFUSION INIT: CPT

## 2021-09-10 PROCEDURE — 80047 BASIC METABLC PNL IONIZED CA: CPT

## 2021-09-10 PROCEDURE — 87088 URINE BACTERIA CULTURE: CPT | Performed by: EMERGENCY MEDICINE

## 2021-09-10 PROCEDURE — 74177 CT ABD & PELVIS W/CONTRAST: CPT | Performed by: EMERGENCY MEDICINE

## 2021-09-10 PROCEDURE — 99214 OFFICE O/P EST MOD 30 MIN: CPT

## 2021-09-10 PROCEDURE — 87186 SC STD MICRODIL/AGAR DIL: CPT | Performed by: EMERGENCY MEDICINE

## 2021-09-10 PROCEDURE — 81002 URINALYSIS NONAUTO W/O SCOPE: CPT

## 2021-09-10 PROCEDURE — 99204 OFFICE O/P NEW MOD 45 MIN: CPT

## 2021-09-10 PROCEDURE — 81025 URINE PREGNANCY TEST: CPT

## 2021-09-10 PROCEDURE — 85025 COMPLETE CBC W/AUTO DIFF WBC: CPT | Performed by: EMERGENCY MEDICINE

## 2021-09-10 RX ORDER — SODIUM CHLORIDE 9 MG/ML
1000 INJECTION, SOLUTION INTRAVENOUS ONCE
Status: COMPLETED | OUTPATIENT
Start: 2021-09-10 | End: 2021-09-10

## 2021-09-10 NOTE — ED INITIAL ASSESSMENT (HPI)
r sided pelvic pain for 2 days, denies n/v, mild diarrhea, no fever, denies urinary symptoms,denies vaginal discharge or bleeding

## 2021-09-10 NOTE — TELEPHONE ENCOUNTER
From: Sweetie Fournier  To: Blas Jewell  Sent: 9/10/2021 3:40 PM CDT  Subject: Visit Follow-up Question    Vadim Kwok. I am still having tender crampy pain lower abdomen, more so on the right and lower back pain. I'm absolutely miserable.  I we

## 2021-09-10 NOTE — ED PROVIDER NOTES
Patient Seen in: [facility]      History   Patient presents with:  Pelvic Pain    Stated Complaint: Right side pelvic pain    HPI/Subjective:   HPI  Cedrick Stephens is a 37year old female,  Patient complains of aching, cramping, pressure-like LITHOTRIPSY WITH CYSTOSCOPY, STENT PLACEMENT;  Surgeon: Lila Portillo DO;  Location: [address] [place] [address]   • LEEP  2003   • PRECIOUS LOCALIZATION WIRE 1 SITE RIGHT ([id number])      age 23 had Right biopsyexciosnal; fibrocystic tissue   • OTHER SURGIC pulses. Pulmonary:      Effort: Pulmonary effort is normal. No respiratory distress. Abdominal:      General: There is distension. Palpations: Abdomen is soft. Tenderness:  There is abdominal tenderness in the right lower quadrant, left upper WBC IC 6.4 4.0 - 11.0 x10ˆ3/uL    RBC IC 4.78 3.80 - 5.30 X10ˆ6/uL    HGB IC 14.1 12.0 - 16.0 g/dL    HCT IC 42.8 35.0 - 48.0 %    MCV IC 89.5 80.0 - 100.0 fL    MCH IC 29.5 26.0 - 34.0 pg    MCHC IC 32.9 31.0 - 37.0 g/dL    PLT .0 150.0 - 450.0 X10ˆ syndrome without diarrhea  Renal stone     Disposition:  Discharge  9/10/2021  2:37 pm    Follow-up:  No follow-up provider specified.         Medications Prescribed:  Discharge Medication List as of 9/10/2021  2:40 PM

## 2021-09-10 NOTE — TELEPHONE ENCOUNTER
Last seen in clinic 08/09/2021. Completed mri liver ordered at [facility] 08/25/2021. Noted on last ct imaging kidney stones present but not obstructing. Alex Smith recommended following up with pcp to evaluate if pain in relation to kidney stones.      MANNIE f

## 2021-09-13 ENCOUNTER — OFFICE VISIT (OUTPATIENT)
Dept: SURGERY | Facility: CLINIC | Age: 41
End: 2021-09-13
Payer: COMMERCIAL

## 2021-09-13 VITALS
BODY MASS INDEX: 35.33 KG/M2 | DIASTOLIC BLOOD PRESSURE: 73 MMHG | RESPIRATION RATE: 16 BRPM | HEIGHT: 62 IN | HEART RATE: 85 BPM | OXYGEN SATURATION: 98 % | SYSTOLIC BLOOD PRESSURE: 105 MMHG | WEIGHT: 192 LBS

## 2021-09-13 DIAGNOSIS — K76.89 LIVER CYST: Primary | ICD-10-CM

## 2021-09-13 RX ORDER — NITROFURANTOIN 25; 75 MG/1; MG/1
CAPSULE ORAL
COMMUNITY
Start: 2021-09-12 | End: 2021-10-06

## 2021-09-13 NOTE — PROGRESS NOTES
[facility]  [address], [address]  [address]  [phone] ([phone]) LOCALIZATION WIRE 1 SITE RIGHT ([id number])      age 23 had Right biopsyexciosnal; fibrocystic tissue   • OTHER SURGICAL HISTORY  2005    Rt breast biopsy   • OTHER SURGICAL HISTORY  8/19/14    Right ESWL- Dr. Tulio Wynn   • OTHER SURGICAL HISTORY  9/2/14 no rash  Neuro: A&Ox3, no asterixis  Psych: normal affect/mood    Assessment and Plan:  37year old with liver cyst    - Her RUQ pain that shoots up into shoulder could likely be related to the enlarging (R) dome liver cyst  - Discussed seeing surgery for

## 2021-09-14 NOTE — TELEPHONE ENCOUNTER
Followed up with Marcy Putnam in relation to [place] received below. 2nd attempt. Left message to call back.

## 2021-09-15 ENCOUNTER — OFFICE VISIT (OUTPATIENT)
Dept: SURGERY | Facility: CLINIC | Age: 41
End: 2021-09-15
Payer: COMMERCIAL

## 2021-09-15 VITALS
DIASTOLIC BLOOD PRESSURE: 77 MMHG | RESPIRATION RATE: 16 BRPM | SYSTOLIC BLOOD PRESSURE: 113 MMHG | HEART RATE: 77 BPM | BODY MASS INDEX: 35.33 KG/M2 | OXYGEN SATURATION: 99 % | WEIGHT: 192 LBS | HEIGHT: 62 IN

## 2021-09-15 DIAGNOSIS — K76.89 LIVER CYST: Primary | ICD-10-CM

## 2021-09-15 PROCEDURE — 3074F SYST BP LT 130 MM HG: CPT | Performed by: SURGERY

## 2021-09-15 PROCEDURE — 99245 OFF/OP CONSLTJ NEW/EST HI 55: CPT | Performed by: SURGERY

## 2021-09-15 PROCEDURE — 3078F DIAST BP <80 MM HG: CPT | Performed by: SURGERY

## 2021-09-15 PROCEDURE — 3008F BODY MASS INDEX DOCD: CPT | Performed by: SURGERY

## 2021-09-15 RX ORDER — BUPROPION HYDROCHLORIDE 100 MG/1
TABLET ORAL
COMMUNITY
Start: 2021-08-30 | End: 2021-10-06

## 2021-09-15 NOTE — TELEPHONE ENCOUNTER
Wade Draper with Loly Villalobos in regards to initial message received below, who informed she ended up having urinary tract infection and prescribed course of antibiotics--macobid 100 mg bid X 7 days.       Saw Dr. Cordon Grandchild 09/13/2021 with discussion on removin

## 2021-09-15 NOTE — H&P (VIEW-ONLY)
[facility]    Patient Name:  Nikia Zapata   YOB: 1980   Gender:  Female   Appt Date:  9/15/2021   Provider:  Christiano Murillo MD   Insurance:  Natali Garcia  Referring Provider:  cyst to measure 16 mm, 12 mm in right lobe of liver. More recently, patient presented to [facility] ED on 6/14/2021 for RLQ abdominal pain. This was remarkable for mild dilation of appendix without evidence for acute appendicitis.  Right hepatic cyst had Reviewed:    Aluminum                RASH, OTHER (SEE COMMENTS)    Comment:Ulcers on skin  Azithromycin            ANGIOEDEMA, SWELLING  Biaxin [Clarithromy*    TONGUE SWELLING  Clarithromycin          ANGIOEDEMA, SWELLING, TONGUE KIDNEY STONE Right 8/19/2014    Procedure: LITHOTRIPSY WITH CYSTOSCOPY, STENT PLACEMENT;  Surgeon: Michoacano Ch DO;  Location: [address] [place] [address]   • LEEP  2003   • PRECIOUS LOCALIZATION WIRE 1 SITE RIGHT ([id number])      age 23 had Right biopsyexc Musculoskeletal: Negative for myalgias. Skin: Negative for rash and wound. Neurological: Negative for dizziness, light-headedness, numbness and headaches. Hematological: Negative for adenopathy.    Psychiatric/Behavioral: Negative for behavioral pro ADRENALS: Unremarkable. KIDNEYS: Enhance symmetrically without hydronephrosis.  Cortical scarring noted in the interpolar right kidney.  A subcentimeter right lower pole renal cyst noted. VASCULATURE:             Abdominal aorta is normal in caliber. bone lesion. Degenerative changes in spine. LUNG BASES: No consolidation or pleural effusion. OTHER: Negative.         Impression   CONCLUSION:   1. Normal appendix.    2. Slight increase in small bowel fluid suggests a mild nonspecific enteritis-likely 4782  [email or web address]        Follow Up:  No follow-ups on file.

## 2021-09-15 NOTE — CONSULTS
[facility]    Patient Name:  Christophe White   YOB: 1980   Gender:  Female   Appt Date:  9/15/2021   Provider:  David Lazo MD   Insurance:  Heidi Rojas  Referring Provider:  cyst to measure 16 mm, 12 mm in right lobe of liver. More recently, patient presented to [facility] ED on 6/14/2021 for RLQ abdominal pain. This was remarkable for mild dilation of appendix without evidence for acute appendicitis.  Right hepatic cyst had Reviewed:    Aluminum                RASH, OTHER (SEE COMMENTS)    Comment:Ulcers on skin  Azithromycin            ANGIOEDEMA, SWELLING  Biaxin [Clarithromy*    TONGUE SWELLING  Clarithromycin          ANGIOEDEMA, SWELLING, TONGUE KIDNEY STONE Right 8/19/2014    Procedure: LITHOTRIPSY WITH CYSTOSCOPY, STENT PLACEMENT;  Surgeon: Carl Garvey DO;  Location: [address] [place] [address]   • LEEP  2003   • PRECIOUS LOCALIZATION WIRE 1 SITE RIGHT (JAR=[id number])      age 23 had Right biopsyexc Musculoskeletal: Negative for myalgias. Skin: Negative for rash and wound. Neurological: Negative for dizziness, light-headedness, numbness and headaches. Hematological: Negative for adenopathy.    Psychiatric/Behavioral: Negative for behavioral pro ADRENALS: Unremarkable. KIDNEYS: Enhance symmetrically without hydronephrosis.  Cortical scarring noted in the interpolar right kidney.  A subcentimeter right lower pole renal cyst noted. VASCULATURE:             Abdominal aorta is normal in caliber. bone lesion. Degenerative changes in spine. LUNG BASES: No consolidation or pleural effusion. OTHER: Negative.         Impression   CONCLUSION:   1. Normal appendix.    2. Slight increase in small bowel fluid suggests a mild nonspecific enteritis-likely 6788  [email or web address]        Follow Up:  No follow-ups on file.

## 2021-09-15 NOTE — TELEPHONE ENCOUNTER
Contacted Amelie and reviewed aprn suggestions listed below. Patient understands next steps and would like for plan for follow up appointment at this time. Verified time, location and to arrive 15 minutes early.  Patient verbalized understanding with

## 2021-09-15 NOTE — TELEPHONE ENCOUNTER
Nursing:    Recommend f/u after surgery and consider need for additional testing. F/u sooner if persistent symptoms despite treatment of UTI.     Thanks,  Kimber Peterson

## 2021-09-15 NOTE — PATIENT INSTRUCTIONS
Surgery: XI Robot-assisted liver cyst excision, possible hepatectomy, possible open     Date of Surgery: [facility]    Hospital:  [address] [place] [address]   Phone: [phone]  · This is an outpatient procedure.   · Use the X-Ray  [facility]ance x [facility]     Miguel Ángel Roland M.D.   Complex Alen Villavicencio nurse line:  [phone]  Monday through Friday 8:00am to 4:30pm.     For Dr. Emily Villavicencio office: [phone]

## 2021-09-15 NOTE — TELEPHONE ENCOUNTER
Regarding: Visit Follow-up Question  ----- Message from Adonis Mcgarry RN sent at 9/15/2021 11:30 AM CDT -----       ----- Message from Hair Michel to TEOFILO Kemp sent at 9/10/2021  3:40 PM -----   Cheyanne Noland.   I am still having ten

## 2021-09-16 DIAGNOSIS — K76.89 LIVER CYST: Primary | ICD-10-CM

## 2021-09-17 ENCOUNTER — LAB ENCOUNTER (OUTPATIENT)
Dept: LAB | Age: 41
End: 2021-09-17
Attending: INTERNAL MEDICINE
Payer: COMMERCIAL

## 2021-09-17 DIAGNOSIS — K76.89 LIVER CYST: ICD-10-CM

## 2021-09-17 PROCEDURE — 86682 HELMINTH ANTIBODY: CPT

## 2021-09-17 PROCEDURE — 36415 COLL VENOUS BLD VENIPUNCTURE: CPT

## 2021-09-21 LAB — Lab: 0.1 IV

## 2021-09-29 ENCOUNTER — TELEPHONE (OUTPATIENT)
Dept: SURGERY | Facility: CLINIC | Age: 41
End: 2021-09-29

## 2021-09-29 NOTE — TELEPHONE ENCOUNTER
Pt called with multiple questions re:surgery. Pt's questions answered to the best of my ability. Pt mentioned she is having a workup by endocrinology for [place]'s Disease and will possibly require steroid treatment.  Pt is set for surgery with Dr. Paresh Schaffer

## 2021-10-06 RX ORDER — ACETAMINOPHEN 500 MG
1000 TABLET ORAL ONCE
Status: CANCELLED | OUTPATIENT
Start: 2021-10-06 | End: 2021-10-06

## 2021-10-08 ENCOUNTER — LAB ENCOUNTER (OUTPATIENT)
Dept: LAB | Age: 41
End: 2021-10-08
Attending: SURGERY
Payer: COMMERCIAL

## 2021-10-08 DIAGNOSIS — Z01.818 PRE-OP TESTING: ICD-10-CM

## 2021-10-08 PROCEDURE — 86901 BLOOD TYPING SEROLOGIC RH(D): CPT

## 2021-10-08 PROCEDURE — 36415 COLL VENOUS BLD VENIPUNCTURE: CPT

## 2021-10-08 PROCEDURE — 86900 BLOOD TYPING SEROLOGIC ABO: CPT

## 2021-10-08 PROCEDURE — 86850 RBC ANTIBODY SCREEN: CPT

## 2021-10-10 ENCOUNTER — ANESTHESIA EVENT (OUTPATIENT)
Dept: SURGERY | Facility: HOSPITAL | Age: 41
End: 2021-10-10
Payer: COMMERCIAL

## 2021-10-11 ENCOUNTER — ANESTHESIA (OUTPATIENT)
Dept: SURGERY | Facility: HOSPITAL | Age: 41
End: 2021-10-11
Payer: COMMERCIAL

## 2021-10-11 ENCOUNTER — HOSPITAL ENCOUNTER (OUTPATIENT)
Facility: HOSPITAL | Age: 41
Discharge: HOME | End: 2021-10-11
Attending: SURGERY | Admitting: SURGERY
Payer: COMMERCIAL

## 2021-10-11 VITALS
HEIGHT: 61 IN | SYSTOLIC BLOOD PRESSURE: 116 MMHG | WEIGHT: 190 LBS | DIASTOLIC BLOOD PRESSURE: 64 MMHG | BODY MASS INDEX: 35.87 KG/M2 | TEMPERATURE: 99 F | HEART RATE: 99 BPM | OXYGEN SATURATION: 98 % | RESPIRATION RATE: 16 BRPM

## 2021-10-11 DIAGNOSIS — K76.89 LIVER CYST: ICD-10-CM

## 2021-10-11 DIAGNOSIS — Z01.818 PRE-OP TESTING: Primary | ICD-10-CM

## 2021-10-11 PROCEDURE — 81025 URINE PREGNANCY TEST: CPT

## 2021-10-11 PROCEDURE — 8E0W4CZ ROBOTIC ASSISTED PROCEDURE OF TRUNK REGION, PERCUTANEOUS ENDOSCOPIC APPROACH: ICD-10-PCS | Performed by: SURGERY

## 2021-10-11 PROCEDURE — 82962 GLUCOSE BLOOD TEST: CPT

## 2021-10-11 PROCEDURE — 88307 TISSUE EXAM BY PATHOLOGIST: CPT | Performed by: SURGERY

## 2021-10-11 PROCEDURE — 88305 TISSUE EXAM BY PATHOLOGIST: CPT | Performed by: SURGERY

## 2021-10-11 PROCEDURE — 0FB04ZZ EXCISION OF LIVER, PERCUTANEOUS ENDOSCOPIC APPROACH: ICD-10-PCS | Performed by: SURGERY

## 2021-10-11 RX ORDER — TRAMADOL HYDROCHLORIDE 50 MG/1
TABLET ORAL EVERY 6 HOURS PRN
Qty: 20 TABLET | Refills: 0 | Status: SHIPPED | OUTPATIENT
Start: 2021-10-11 | End: 2021-11-10

## 2021-10-11 RX ORDER — HYDROCODONE BITARTRATE AND ACETAMINOPHEN 5; 325 MG/1; MG/1
1 TABLET ORAL AS NEEDED
Status: DISCONTINUED | OUTPATIENT
Start: 2021-10-11 | End: 2021-10-11

## 2021-10-11 RX ORDER — HEPARIN SODIUM 5000 [USP'U]/ML
5000 INJECTION, SOLUTION INTRAVENOUS; SUBCUTANEOUS
Status: COMPLETED | OUTPATIENT
Start: 2021-10-11 | End: 2021-10-11

## 2021-10-11 RX ORDER — METOCLOPRAMIDE 10 MG/1
10 TABLET ORAL ONCE
Status: COMPLETED | OUTPATIENT
Start: 2021-10-11 | End: 2021-10-11

## 2021-10-11 RX ORDER — HYDROMORPHONE HYDROCHLORIDE 1 MG/ML
0.6 INJECTION, SOLUTION INTRAMUSCULAR; INTRAVENOUS; SUBCUTANEOUS EVERY 5 MIN PRN
Status: DISCONTINUED | OUTPATIENT
Start: 2021-10-11 | End: 2021-10-11

## 2021-10-11 RX ORDER — SODIUM CHLORIDE, SODIUM LACTATE, POTASSIUM CHLORIDE, CALCIUM CHLORIDE 600; 310; 30; 20 MG/100ML; MG/100ML; MG/100ML; MG/100ML
INJECTION, SOLUTION INTRAVENOUS CONTINUOUS
Status: DISCONTINUED | OUTPATIENT
Start: 2021-10-11 | End: 2021-10-11

## 2021-10-11 RX ORDER — KETAMINE HYDROCHLORIDE 50 MG/ML
INJECTION, SOLUTION, CONCENTRATE INTRAMUSCULAR; INTRAVENOUS AS NEEDED
Status: DISCONTINUED | OUTPATIENT
Start: 2021-10-11 | End: 2021-10-11 | Stop reason: SURG

## 2021-10-11 RX ORDER — HYDROMORPHONE HYDROCHLORIDE 1 MG/ML
0.4 INJECTION, SOLUTION INTRAMUSCULAR; INTRAVENOUS; SUBCUTANEOUS EVERY 5 MIN PRN
Status: DISCONTINUED | OUTPATIENT
Start: 2021-10-11 | End: 2021-10-11

## 2021-10-11 RX ORDER — DEXTROSE MONOHYDRATE 25 G/50ML
50 INJECTION, SOLUTION INTRAVENOUS
Status: DISCONTINUED | OUTPATIENT
Start: 2021-10-11 | End: 2021-10-11

## 2021-10-11 RX ORDER — MIDAZOLAM HYDROCHLORIDE 1 MG/ML
INJECTION INTRAMUSCULAR; INTRAVENOUS AS NEEDED
Status: DISCONTINUED | OUTPATIENT
Start: 2021-10-11 | End: 2021-10-11 | Stop reason: SURG

## 2021-10-11 RX ORDER — SODIUM CHLORIDE 9 MG/ML
INJECTION, SOLUTION INTRAVENOUS CONTINUOUS PRN
Status: DISCONTINUED | OUTPATIENT
Start: 2021-10-11 | End: 2021-10-11 | Stop reason: SURG

## 2021-10-11 RX ORDER — MORPHINE SULFATE 10 MG/ML
6 INJECTION, SOLUTION INTRAMUSCULAR; INTRAVENOUS EVERY 10 MIN PRN
Status: DISCONTINUED | OUTPATIENT
Start: 2021-10-11 | End: 2021-10-11

## 2021-10-11 RX ORDER — DEXAMETHASONE SODIUM PHOSPHATE 4 MG/ML
VIAL (ML) INJECTION AS NEEDED
Status: DISCONTINUED | OUTPATIENT
Start: 2021-10-11 | End: 2021-10-11 | Stop reason: SURG

## 2021-10-11 RX ORDER — ROCURONIUM BROMIDE 10 MG/ML
INJECTION, SOLUTION INTRAVENOUS AS NEEDED
Status: DISCONTINUED | OUTPATIENT
Start: 2021-10-11 | End: 2021-10-11 | Stop reason: SURG

## 2021-10-11 RX ORDER — PROCHLORPERAZINE EDISYLATE 5 MG/ML
5 INJECTION INTRAMUSCULAR; INTRAVENOUS ONCE AS NEEDED
Status: DISCONTINUED | OUTPATIENT
Start: 2021-10-11 | End: 2021-10-11

## 2021-10-11 RX ORDER — PHENYLEPHRINE HCL 10 MG/ML
VIAL (ML) INJECTION AS NEEDED
Status: DISCONTINUED | OUTPATIENT
Start: 2021-10-11 | End: 2021-10-11 | Stop reason: SURG

## 2021-10-11 RX ORDER — MORPHINE SULFATE 4 MG/ML
2 INJECTION, SOLUTION INTRAMUSCULAR; INTRAVENOUS EVERY 10 MIN PRN
Status: DISCONTINUED | OUTPATIENT
Start: 2021-10-11 | End: 2021-10-11

## 2021-10-11 RX ORDER — GLYCOPYRROLATE 0.2 MG/ML
INJECTION, SOLUTION INTRAMUSCULAR; INTRAVENOUS AS NEEDED
Status: DISCONTINUED | OUTPATIENT
Start: 2021-10-11 | End: 2021-10-11 | Stop reason: SURG

## 2021-10-11 RX ORDER — MORPHINE SULFATE 4 MG/ML
4 INJECTION, SOLUTION INTRAMUSCULAR; INTRAVENOUS EVERY 10 MIN PRN
Status: DISCONTINUED | OUTPATIENT
Start: 2021-10-11 | End: 2021-10-11

## 2021-10-11 RX ORDER — MAGNESIUM HYDROXIDE 1200 MG/15ML
LIQUID ORAL CONTINUOUS PRN
Status: COMPLETED | OUTPATIENT
Start: 2021-10-11 | End: 2021-10-11

## 2021-10-11 RX ORDER — NALOXONE HYDROCHLORIDE 0.4 MG/ML
80 INJECTION, SOLUTION INTRAMUSCULAR; INTRAVENOUS; SUBCUTANEOUS AS NEEDED
Status: DISCONTINUED | OUTPATIENT
Start: 2021-10-11 | End: 2021-10-11

## 2021-10-11 RX ORDER — LIDOCAINE HYDROCHLORIDE ANHYDROUS AND DEXTROSE MONOHYDRATE .8; 5 G/100ML; G/100ML
INJECTION, SOLUTION INTRAVENOUS CONTINUOUS PRN
Status: DISCONTINUED | OUTPATIENT
Start: 2021-10-11 | End: 2021-10-11 | Stop reason: SURG

## 2021-10-11 RX ORDER — HYDROCODONE BITARTRATE AND ACETAMINOPHEN 5; 325 MG/1; MG/1
2 TABLET ORAL AS NEEDED
Status: DISCONTINUED | OUTPATIENT
Start: 2021-10-11 | End: 2021-10-11

## 2021-10-11 RX ORDER — HALOPERIDOL 5 MG/ML
0.25 INJECTION INTRAMUSCULAR ONCE AS NEEDED
Status: DISCONTINUED | OUTPATIENT
Start: 2021-10-11 | End: 2021-10-11

## 2021-10-11 RX ORDER — LIDOCAINE HYDROCHLORIDE 10 MG/ML
INJECTION, SOLUTION EPIDURAL; INFILTRATION; INTRACAUDAL; PERINEURAL AS NEEDED
Status: DISCONTINUED | OUTPATIENT
Start: 2021-10-11 | End: 2021-10-11 | Stop reason: SURG

## 2021-10-11 RX ORDER — HYDROMORPHONE HYDROCHLORIDE 1 MG/ML
0.2 INJECTION, SOLUTION INTRAMUSCULAR; INTRAVENOUS; SUBCUTANEOUS EVERY 5 MIN PRN
Status: DISCONTINUED | OUTPATIENT
Start: 2021-10-11 | End: 2021-10-11

## 2021-10-11 RX ORDER — FAMOTIDINE 20 MG/1
20 TABLET ORAL ONCE
Status: COMPLETED | OUTPATIENT
Start: 2021-10-11 | End: 2021-10-11

## 2021-10-11 RX ORDER — ONDANSETRON 2 MG/ML
INJECTION INTRAMUSCULAR; INTRAVENOUS AS NEEDED
Status: DISCONTINUED | OUTPATIENT
Start: 2021-10-11 | End: 2021-10-11 | Stop reason: SURG

## 2021-10-11 RX ORDER — ONDANSETRON 2 MG/ML
4 INJECTION INTRAMUSCULAR; INTRAVENOUS ONCE AS NEEDED
Status: COMPLETED | OUTPATIENT
Start: 2021-10-11 | End: 2021-10-11

## 2021-10-11 RX ORDER — EPHEDRINE SULFATE 50 MG/ML
INJECTION, SOLUTION INTRAVENOUS AS NEEDED
Status: DISCONTINUED | OUTPATIENT
Start: 2021-10-11 | End: 2021-10-11 | Stop reason: SURG

## 2021-10-11 RX ADMIN — PHENYLEPHRINE HCL 100 MCG: 10 MG/ML VIAL (ML) INJECTION at 08:11:00

## 2021-10-11 RX ADMIN — DEXAMETHASONE SODIUM PHOSPHATE 4 MG: 4 MG/ML VIAL (ML) INJECTION at 08:03:00

## 2021-10-11 RX ADMIN — SODIUM CHLORIDE: 9 INJECTION, SOLUTION INTRAVENOUS at 07:49:00

## 2021-10-11 RX ADMIN — LIDOCAINE HYDROCHLORIDE ANHYDROUS AND DEXTROSE MONOHYDRATE 0.58 MG/MIN: .8; 5 INJECTION, SOLUTION INTRAVENOUS at 08:16:00

## 2021-10-11 RX ADMIN — ROCURONIUM BROMIDE 45 MG: 10 INJECTION, SOLUTION INTRAVENOUS at 07:52:00

## 2021-10-11 RX ADMIN — LIDOCAINE HYDROCHLORIDE ANHYDROUS AND DEXTROSE MONOHYDRATE 0.58 MG/MIN: .8; 5 INJECTION, SOLUTION INTRAVENOUS at 08:50:00

## 2021-10-11 RX ADMIN — MIDAZOLAM HYDROCHLORIDE 2 MG: 1 INJECTION INTRAMUSCULAR; INTRAVENOUS at 07:36:00

## 2021-10-11 RX ADMIN — ONDANSETRON 4 MG: 2 INJECTION INTRAMUSCULAR; INTRAVENOUS at 09:01:00

## 2021-10-11 RX ADMIN — EPHEDRINE SULFATE 5 MG: 50 INJECTION, SOLUTION INTRAVENOUS at 08:28:00

## 2021-10-11 RX ADMIN — LIDOCAINE HYDROCHLORIDE ANHYDROUS AND DEXTROSE MONOHYDRATE 0.7 MG/MIN: .8; 5 INJECTION, SOLUTION INTRAVENOUS at 08:30:00

## 2021-10-11 RX ADMIN — SODIUM CHLORIDE, SODIUM LACTATE, POTASSIUM CHLORIDE, CALCIUM CHLORIDE: 600; 310; 30; 20 INJECTION, SOLUTION INTRAVENOUS at 09:02:00

## 2021-10-11 RX ADMIN — GLYCOPYRROLATE 0.1 MG: 0.2 INJECTION, SOLUTION INTRAMUSCULAR; INTRAVENOUS at 08:30:00

## 2021-10-11 RX ADMIN — ROCURONIUM BROMIDE 5 MG: 10 INJECTION, SOLUTION INTRAVENOUS at 07:45:00

## 2021-10-11 RX ADMIN — PHENYLEPHRINE HCL 100 MCG: 10 MG/ML VIAL (ML) INJECTION at 08:28:00

## 2021-10-11 RX ADMIN — KETAMINE HYDROCHLORIDE 40 MG: 50 INJECTION, SOLUTION, CONCENTRATE INTRAMUSCULAR; INTRAVENOUS at 08:15:00

## 2021-10-11 RX ADMIN — SODIUM CHLORIDE, SODIUM LACTATE, POTASSIUM CHLORIDE, CALCIUM CHLORIDE: 600; 310; 30; 20 INJECTION, SOLUTION INTRAVENOUS at 07:36:00

## 2021-10-11 RX ADMIN — LIDOCAINE HYDROCHLORIDE 50 MG: 10 INJECTION, SOLUTION EPIDURAL; INFILTRATION; INTRACAUDAL; PERINEURAL at 07:45:00

## 2021-10-11 NOTE — ANESTHESIA PREPROCEDURE EVALUATION
Anesthesia PreOp Note    HPI:     Loreda Schlatter is a 37year old female who presents for preoperative consultation requested by: Flaquita Black MD    Date of Surgery: 10/11/2021    Procedure(s):  XI ROBOT-ASSISTED LIVER CYST EXCISION, POSSIBLE HEPATECTOM Date Noted: 02/21/2013      Esophageal reflux         Date Noted: 12/03/2010      PCOS (polycystic ovarian syndrome)         Date Noted: 12/03/2010        Past Medical History:   Diagnosis Date   • ABDOMINAL PAIN     mild cramping   • Anxiety    • Calculu Take 1 tablet (20 mg total) by mouth 2 (two) times a day.  (Patient taking differently: Take 20 mg by mouth at bedtime.), Disp: 60 tablet, Rfl: 0, 10/10/2021 at 2100  Hyoscyamine Sulfate (LEVSIN/SL) 0.125 MG Sublingual SL Tab, Place 1 tablet (125 mcg total) RASH    Comment:Also reaction to aluminum- enviornmental    Family History   Problem Relation Age of Onset   • Lipids Mother    • Heart Disorder Mother    • Hypertension Mother    • Diabetes Mother    • Breast Cancer Mother 79     Social History    [employer] Organization Meetings: Not on file      Marital Status: Not on file  Intimate Partner Violence:       Fear of Current or Ex-Partner: Not on file      Emotionally Abused: Not on file      Physically Abused: Not on file      Sexually Abused: Not on file  [place] Denies complications with GA.    Informed Consent Plan and Risks Discussed With:  Patient  Discussed plan with:  CRNA      I have informed Joselineselvin Jodi and/or legal guardian or family member of the nature of the anesthetic plan, benefits, risks including

## 2021-10-11 NOTE — BRIEF OP NOTE
Pre-Operative Diagnosis: Liver cyst [K76.89]     Post-Operative Diagnosis: Liver cyst [K76.89]      Procedure Performed:   XI ROBOT-ASSISTED LIVER CYST wall and segment 8 EXCISION and portion of liver, drainage of intrahepatic cyst/segment 8    Surgeon(s)

## 2021-10-11 NOTE — ANESTHESIA PROCEDURE NOTES
Airway  Date/Time: 10/11/2021 7:48 AM  Urgency: Elective      General Information and Staff    Patient location during procedure: OR  Anesthesiologist: Gina Good MD  Resident/CRNA: Malia Patel CRNA  Performed: CRNA     Indications and Patie

## 2021-10-11 NOTE — ANESTHESIA PROCEDURE NOTES
Arterial Line  Performed by: Albert Peters MD  Authorized by: Albert Peters MD     General Information and Staff    Procedure Start:  10/11/2021 7:55 AM  Procedure End:  10/11/2021 7:58 AM  Anesthesiologist:  Albert Peters MD  Performed By

## 2021-10-11 NOTE — INTERVAL H&P NOTE
Patient seen and examined. No changes to the attached history and physical are noted. 37year old female presenting today for planned robotic liver cyst excision.     Odilia Kumar MD  [facility] General Surgical Oncology  Formerly [facility]  Pager [phone]

## 2021-10-11 NOTE — OPERATIVE REPORT
Date of operation 10/11/2021    Preoperative diagnosis:  1. Systematic segment 8 large liver cyst    Operation performed:  1.  Robotic assisted resection of segment 8 cyst wall and portion of superficial segment 8  2.   Drainage of segment 8 cyst    Postop cyst wall was excised to allow a widemouth opening along the cyst itself. In order to achieve that, a small portion of segment 8 was included with the specimen. Care was taken to stay clear of the hepatic veins at the dome of the liver.   The specimen was

## 2021-10-11 NOTE — BRIEF OP NOTE
Pre-Operative Diagnosis: Liver cyst [K76.89]     Post-Operative Diagnosis: Liver cyst [K76.89]      Procedure Performed:   XI ROBOT-ASSISTED LIVER CYST EXCISION, POSSIBLE HEPATECTOMY, POSSIBLE OPEN     Surgeon(s) and Role:     * Jose Anaya MD - [facility]

## 2021-10-11 NOTE — ANESTHESIA POSTPROCEDURE EVALUATION
Patient: Renee Robles    Procedure Summary     Date: 10/11/21 Room / Location: [facility] OR 06 / [facility] OR    Anesthesia Start: 5900 Anesthesia Stop: 0842    Procedure: XI ROBOT-ASSISTED LIVER CYST EXCISION, POSSIBLE HEPATECTOMY, POSSIBLE OPEN (N/A Abdo

## 2021-10-12 ENCOUNTER — TELEPHONE (OUTPATIENT)
Dept: SURGERY | Facility: CLINIC | Age: 41
End: 2021-10-12

## 2021-10-12 DIAGNOSIS — K76.89 LIVER CYST: Primary | ICD-10-CM

## 2021-10-12 RX ORDER — ONDANSETRON 4 MG/1
4 TABLET, ORALLY DISINTEGRATING ORAL EVERY 4 HOURS PRN
Qty: 10 TABLET | Refills: 0 | Status: SHIPPED | OUTPATIENT
Start: 2021-10-12 | End: 2021-11-10

## 2021-10-12 NOTE — TELEPHONE ENCOUNTER
Called pt to see how she is feeling after her surgery with Dr. Mica Smith yesterday. Pt feeling well overall, had a BM this morning, c/o slight nausea, will send over Zofran. Denies fevers or chills. No issues with surgical site.  Post op appt made with Dr. Rayray Luis

## 2021-10-20 ENCOUNTER — OFFICE VISIT (OUTPATIENT)
Dept: SURGERY | Facility: CLINIC | Age: 41
End: 2021-10-20
Payer: COMMERCIAL

## 2021-10-20 VITALS
WEIGHT: 188.19 LBS | DIASTOLIC BLOOD PRESSURE: 86 MMHG | HEART RATE: 85 BPM | SYSTOLIC BLOOD PRESSURE: 118 MMHG | HEIGHT: 61 IN | TEMPERATURE: 98 F | RESPIRATION RATE: 16 BRPM | OXYGEN SATURATION: 99 % | BODY MASS INDEX: 35.53 KG/M2

## 2021-10-20 DIAGNOSIS — K76.89 LIVER CYST: Primary | ICD-10-CM

## 2021-10-20 PROCEDURE — 99024 POSTOP FOLLOW-UP VISIT: CPT | Performed by: SURGERY

## 2021-10-20 PROCEDURE — 3079F DIAST BP 80-89 MM HG: CPT | Performed by: SURGERY

## 2021-10-20 PROCEDURE — 3074F SYST BP LT 130 MM HG: CPT | Performed by: SURGERY

## 2021-10-20 PROCEDURE — 3008F BODY MASS INDEX DOCD: CPT | Performed by: SURGERY

## 2021-10-20 NOTE — PROGRESS NOTES
[facility]    Patient Name:  Gem Hamilton   YOB: 1980   Gender:  Female   Appt Date:  10/20/2021   Provider:  Afsaneh Diaz MD   Insurance:  [facility]     PATIENT PROVIDERS  Referring Provider: Rosalind Eng 118/86 (BP Location: Left arm, Patient Position: Sitting, Cuff Size: large)   Pulse 85   Temp 97.8 °F (36.6 °C)   Resp 16   Ht 1.549 m (5' 1\")   Wt 85.4 kg (188 lb 3.2 oz)   LMP 09/16/2021   SpO2 99%   BMI 35.56 kg/m²      Medications Reviewed:    Current reaction(s): Myalgias (muscle pain)             Vomiting and jaw lock  Erythromycin            ANGIOEDEMA, SWELLING  Nickel                  OTHER (SEE COMMENTS), RASH    Comment:Skin ulcer. Skin ulcer. Skin ulcer.   Nickel RETROGRADE PYELOGRAM Right 9/2/2014    Procedure: CYSTOSCOPY/URETEROSCOPY/STONE EXTRACTION;  Surgeon: Norman Smith DO;  Location: [address] [place] [address] Social History:  Social History    Socioeconomic History      Marital status:  Jewels Childsha robotic-assisted resection:  · Portion of hepatic parenchyma demonstrating a small portal tract Von Meyenburg complex and areas of benign hepatic cyst lining characterized by areas of collagenous wall stroma with few areas of overlying flat biliary epithel

## 2021-10-27 ENCOUNTER — LAB ENCOUNTER (OUTPATIENT)
Dept: LAB | Age: 41
End: 2021-10-27
Attending: INTERNAL MEDICINE
Payer: COMMERCIAL

## 2021-10-27 ENCOUNTER — OFFICE VISIT (OUTPATIENT)
Dept: RHEUMATOLOGY | Facility: CLINIC | Age: 41
End: 2021-10-27
Payer: COMMERCIAL

## 2021-10-27 VITALS
TEMPERATURE: 98 F | WEIGHT: 193 LBS | RESPIRATION RATE: 16 BRPM | BODY MASS INDEX: 36.44 KG/M2 | SYSTOLIC BLOOD PRESSURE: 118 MMHG | HEIGHT: 61 IN | OXYGEN SATURATION: 99 % | HEART RATE: 98 BPM | DIASTOLIC BLOOD PRESSURE: 76 MMHG

## 2021-10-27 DIAGNOSIS — M25.50 POLYARTHRALGIA: Primary | ICD-10-CM

## 2021-10-27 DIAGNOSIS — M25.50 POLYARTHRALGIA: ICD-10-CM

## 2021-10-27 DIAGNOSIS — R76.8 SMITH ANTIBODY POSITIVE: ICD-10-CM

## 2021-10-27 DIAGNOSIS — R53.82 CHRONIC FATIGUE: ICD-10-CM

## 2021-10-27 DIAGNOSIS — R23.1 LIVEDO RETICULARIS: ICD-10-CM

## 2021-10-27 DIAGNOSIS — E55.9 VITAMIN D DEFICIENCY: ICD-10-CM

## 2021-10-27 DIAGNOSIS — R76.8 SMITH ANTIBODY POSITIVE: Primary | ICD-10-CM

## 2021-10-27 PROCEDURE — 86140 C-REACTIVE PROTEIN: CPT | Performed by: INTERNAL MEDICINE

## 2021-10-27 PROCEDURE — 86255 FLUORESCENT ANTIBODY SCREEN: CPT | Performed by: INTERNAL MEDICINE

## 2021-10-27 PROCEDURE — 86334 IMMUNOFIX E-PHORESIS SERUM: CPT | Performed by: INTERNAL MEDICINE

## 2021-10-27 PROCEDURE — 84165 PROTEIN E-PHORESIS SERUM: CPT | Performed by: INTERNAL MEDICINE

## 2021-10-27 PROCEDURE — 3008F BODY MASS INDEX DOCD: CPT | Performed by: INTERNAL MEDICINE

## 2021-10-27 PROCEDURE — 83516 IMMUNOASSAY NONANTIBODY: CPT | Performed by: INTERNAL MEDICINE

## 2021-10-27 PROCEDURE — 83883 ASSAY NEPHELOMETRY NOT SPEC: CPT | Performed by: INTERNAL MEDICINE

## 2021-10-27 PROCEDURE — 82784 ASSAY IGA/IGD/IGG/IGM EACH: CPT | Performed by: INTERNAL MEDICINE

## 2021-10-27 PROCEDURE — 85652 RBC SED RATE AUTOMATED: CPT | Performed by: INTERNAL MEDICINE

## 2021-10-27 PROCEDURE — 83876 ASSAY MYELOPEROXIDASE: CPT | Performed by: INTERNAL MEDICINE

## 2021-10-27 PROCEDURE — 86162 COMPLEMENT TOTAL (CH50): CPT | Performed by: INTERNAL MEDICINE

## 2021-10-27 PROCEDURE — 82607 VITAMIN B-12: CPT | Performed by: INTERNAL MEDICINE

## 2021-10-27 PROCEDURE — 85025 COMPLETE CBC W/AUTO DIFF WBC: CPT | Performed by: INTERNAL MEDICINE

## 2021-10-27 PROCEDURE — 3078F DIAST BP <80 MM HG: CPT | Performed by: INTERNAL MEDICINE

## 2021-10-27 PROCEDURE — 99245 OFF/OP CONSLTJ NEW/EST HI 55: CPT | Performed by: INTERNAL MEDICINE

## 2021-10-27 PROCEDURE — 80053 COMPREHEN METABOLIC PANEL: CPT | Performed by: INTERNAL MEDICINE

## 2021-10-27 PROCEDURE — 82595 ASSAY OF CRYOGLOBULIN: CPT | Performed by: INTERNAL MEDICINE

## 2021-10-27 PROCEDURE — 3074F SYST BP LT 130 MM HG: CPT | Performed by: INTERNAL MEDICINE

## 2021-10-27 RX ORDER — ERGOCALCIFEROL 1.25 MG/1
50000 CAPSULE ORAL WEEKLY
Qty: 12 CAPSULE | Refills: 0 | Status: SHIPPED | OUTPATIENT
Start: 2021-10-27 | End: 2022-01-25

## 2021-10-27 NOTE — PROGRESS NOTES
?  RHEUMATOLOGY NEW PATIENT   Date of visit: 10/27/2021  ? Patient presents with:  Establish Care: new pt. Dr. Templeton Knife referral. Did see Dr. DMITRI RODRIGUES earlier in the year. In february would have reoccuring mouth sores. Neck and facial pain. Fatigue.  Some penny educating the patient/family/caregiver, ordering medications or testing, referring and communicating with other healthcare providers, documenting clinical information in the E HR, independently interpreting results and communicating results to the patient/ Future  -     CBC WITH DIFFERENTIAL WITH PLATELET; Future  -     COMP METABOLIC PANEL (14); Future  -     IMMUNOGLOBULIN A/G/M, QUANT; Future  -     COMPLEMENT, TOTAL (CH50); Future    Other orders  -     ergocalciferol 1.25 MG (82237 UT) Oral Cap;  Take 1 (2014)  + hair loss/thinning  + can get ulcer in the nose   + pain located in wrists b/l (was wearing braces in August), b/l knees. Can get swelling in the hand and knees.  Swelling diffuse over the hands, not just over the joints.   + hx of one miscarriage (irritable bowel syndrome)    • Kidney disease    • Migraines    • OBESITY    • PONV (postoperative nausea and vomiting)    • Thyroid nodule 2016   • Ulcer      Past Surgical History:  Past Surgical History:   Procedure Laterality Date   •  Cigarettes      Smokeless tobacco: Never Used    Vaping Use      Vaping Use: Never used    Alcohol use: Not Currently    Drug use: No    Medications:  mupirocin 2 % External Ointment, , Disp: , Rfl:   ergocalciferol 1.25 MG (26156 UT) Oral Cap, Take 1 caps ulcer.             Skin ulcer. Skin ulcer. Nickel                  RASH    Comment:Also reaction to aluminum- enviornmental  ?  REVIEW OF SYSTEMS   ? Review of Systems   Constitutional: Positive for chills and malaise/fatigue.  Negative for fe Trachea: No tracheal deviation. Cardiovascular:      Rate and Rhythm: Normal rate and regular rhythm. Heart sounds: Normal heart sounds. No murmur heard. Pulmonary:      Effort: Pulmonary effort is normal. No respiratory distress.       Breath s smaller cysts seen throughout the liver.  There is a 0.7 cm T2 hyperintense lesion in the left lobe segment 2.  This demonstrates homogeneous arterial enhancement which persists on delayed sequences and most likely reflects a    small flash filling meningio silhouette.      FINAL REPORT   Attending Radiologist: Eduin Mcfarland MD   Date Signed Off:  12/26/2020 16:18          Labs:  Lab Results   Component Value Date    WBC 8.0 10/27/2021    RBC 4.71 10/27/2021    HGB 13.8 10/27/2021    HCT 42.2 10/27/2021    PLT

## 2021-10-29 ENCOUNTER — TELEPHONE (OUTPATIENT)
Dept: SURGERY | Facility: CLINIC | Age: 41
End: 2021-10-29

## 2021-10-29 NOTE — TELEPHONE ENCOUNTER
Pt called inquiring if she is ok to consume alcohol at this point, surgery date was 10/11. Per Dr. Keyona Chavez, pt ok to drink alcohol now. Pt appreciated the information.

## 2021-11-01 NOTE — PROGRESS NOTES
[address] [facility] [address] Gastroenterology                                                                                                               Reason for consult: f smoker  Alcohol: social  Illicit drugs:no     Maternal grandmother had colon ca in her 42's  No FH ibd  No FH celiac     Twin sister has colitis     Cln/egd done at [facility] Dr. Dave Roque 2/10/20:   Insertion to cecum     egd w/ possible minimal red Right 9/2/2014    Procedure: CYSTOSCOPY WITH RETROGRADE PYELOGRAM;  Surgeon: Jessica Reynoso DO;  Location: [address] [place] [address]   • D & C  9/14/2012   • FRAGMENTING OF KIDNEY STONE Right 8/19/2014    Procedure: LITHOTRIPSY WITH CYSTOSCOPY, STENT P 10/27/2021) 10 tablet 0   • ondansetron 4 MG Oral Tablet Dispersible Take 1 tablet (4 mg total) by mouth every 4 (four) hours as needed for Nausea.  (Patient not taking: Reported on 10/27/2021) 10 tablet 0   • traMADol 50 MG Oral Tab Take 1-2 tablets (50-10 vision  HEENT: Negative for dysphagia and hoarseness  RESPIRATORY: Negative for cough and shortness of breath  CARDIOVASCULAR: Negative for chest pain, palpitations  GASTROINTESTINAL: See HPI  GENITOURINARY: Negative for dysuria and frequency  MUSCULOSKELE 10/2021. Undergoing rheum w/u currently.   Recommendation for cln 2/2023 and would ctm for need for repeat cln sooner than recommended screening interval to exclude crohns.       #gerd  #nausea  She is taking her aciphex at night to control nocturnal sympt

## 2021-11-03 ENCOUNTER — OFFICE VISIT (OUTPATIENT)
Dept: GASTROENTEROLOGY | Facility: CLINIC | Age: 41
End: 2021-11-03
Payer: COMMERCIAL

## 2021-11-03 VITALS
SYSTOLIC BLOOD PRESSURE: 111 MMHG | HEART RATE: 95 BPM | DIASTOLIC BLOOD PRESSURE: 77 MMHG | TEMPERATURE: 99 F | WEIGHT: 192 LBS | HEIGHT: 61 IN | BODY MASS INDEX: 36.25 KG/M2

## 2021-11-03 DIAGNOSIS — R19.7 DIARRHEA, UNSPECIFIED TYPE: Primary | ICD-10-CM

## 2021-11-03 DIAGNOSIS — Z12.11 COLON CANCER SCREENING: ICD-10-CM

## 2021-11-03 DIAGNOSIS — R11.0 NAUSEA: ICD-10-CM

## 2021-11-03 DIAGNOSIS — K21.9 GASTROESOPHAGEAL REFLUX DISEASE, UNSPECIFIED WHETHER ESOPHAGITIS PRESENT: ICD-10-CM

## 2021-11-03 DIAGNOSIS — K76.89 HEPATIC CYST: ICD-10-CM

## 2021-11-03 PROCEDURE — 3008F BODY MASS INDEX DOCD: CPT | Performed by: NURSE PRACTITIONER

## 2021-11-03 PROCEDURE — 3074F SYST BP LT 130 MM HG: CPT | Performed by: NURSE PRACTITIONER

## 2021-11-03 PROCEDURE — 3078F DIAST BP <80 MM HG: CPT | Performed by: NURSE PRACTITIONER

## 2021-11-03 PROCEDURE — 99215 OFFICE O/P EST HI 40 MIN: CPT | Performed by: NURSE PRACTITIONER

## 2021-11-03 NOTE — PATIENT INSTRUCTIONS
-reflux diet modification  -aciphex in am   -pepcid at night  -colon 2/2023 unless otherwise indicated  -recommend next visit w/ Dr. Jhon Araujo if on-going symptoms    Tips to Control Acid Reflux    To control acid reflux, you’ll need to make some basic diet an

## 2021-11-10 ENCOUNTER — OFFICE VISIT (OUTPATIENT)
Dept: RHEUMATOLOGY | Facility: CLINIC | Age: 41
End: 2021-11-10
Payer: COMMERCIAL

## 2021-11-10 VITALS
WEIGHT: 191 LBS | TEMPERATURE: 98 F | HEART RATE: 80 BPM | RESPIRATION RATE: 16 BRPM | OXYGEN SATURATION: 99 % | HEIGHT: 61 IN | DIASTOLIC BLOOD PRESSURE: 70 MMHG | BODY MASS INDEX: 36.06 KG/M2 | SYSTOLIC BLOOD PRESSURE: 116 MMHG

## 2021-11-10 DIAGNOSIS — R53.82 CHRONIC FATIGUE: ICD-10-CM

## 2021-11-10 DIAGNOSIS — R76.8 ANA POSITIVE: ICD-10-CM

## 2021-11-10 DIAGNOSIS — R23.1 LIVEDO RETICULARIS: ICD-10-CM

## 2021-11-10 DIAGNOSIS — M25.50 POLYARTHRALGIA: Primary | ICD-10-CM

## 2021-11-10 PROCEDURE — 3078F DIAST BP <80 MM HG: CPT | Performed by: INTERNAL MEDICINE

## 2021-11-10 PROCEDURE — 99214 OFFICE O/P EST MOD 30 MIN: CPT | Performed by: INTERNAL MEDICINE

## 2021-11-10 PROCEDURE — 3008F BODY MASS INDEX DOCD: CPT | Performed by: INTERNAL MEDICINE

## 2021-11-10 PROCEDURE — 3074F SYST BP LT 130 MM HG: CPT | Performed by: INTERNAL MEDICINE

## 2021-11-10 RX ORDER — FAMOTIDINE 20 MG/1
TABLET ORAL
COMMUNITY
Start: 2021-11-06

## 2021-11-10 NOTE — PROGRESS NOTES
?  RHEUMATOLOGY FOLLOW UP   Date of visit: 11/10/2021  ? Patient presents with: Follow - Up: 2 week f/u. Still having GI issues. No new symptoms. Here for test results and plan management.      ?  ASSESSMENT, DISCUSSION & PLAN   Assessment:  Jakob Duncan testing, referring and communicating with other healthcare providers, documenting clinical information in the E HR, independently interpreting results and communicating results to the patient/family/caregiver and care coordination with the patient's other going for another total excision of a few lesions       HPI from initial consultation    referred for rheumatologic evaluation due to second opinion. States she first started to have problems about 2018.  Thought she was getting lymph nodes enlarged in + trouble swallowing, had swallow study done last year and full esophagram was normal   + dry eyes, uses artifical tears and gel at night, has tried plugs before but had allergic reaction to the material.  + dry mouth, not severe. No recurrent cavities. CYSTO/URETERO W/LITHOTRIPSY Right 9/2/2014    Procedure: LITHOTRIPSY HOLMIUM LASER WITH CYSTOSCOPY;  Surgeon: Derrell Chaves DO;  Location: [address] [place] [address] W/STONE REMOVE Right 9/2/2014    Procedure: CYSTOSCOPY WITH RETROGRADE Rfl: 0  RABEPRAZOLE SODIUM 20 MG Oral Tab EC, Take 1 tablet (20 mg total) by mouth 2 (two) times a day.  (Patient taking differently: Take 20 mg by mouth at bedtime.), Disp: 60 tablet, Rfl: 0  Hyoscyamine Sulfate (LEVSIN/SL) 0.125 MG Sublingual SL Tab, Plac Positive for blurred vision, double vision, photophobia, pain and redness. Respiratory: Positive for cough. Negative for hemoptysis, shortness of breath and wheezing. Cardiovascular: Positive for chest pain and palpitations. Negative for leg swelling. restriction of motion of the DIPs, PIPs, MCPs, wrists, elbows, ankles, or joints of the feet. Bilateral shoulders with full ROM, no evidence of impingement with provocative maneuvers.   Bilateral knees with medial joint line tenderness, no crepitus, no eff dilated. PANCREAS: Unremarkable. SPLEEN: Unremarkable. ADRENALS: Unremarkable. KIDNEYS: Enhance symmetrically without hydronephrosis.  Cortical scarring noted in the interpolar right kidney.  A subcentimeter right lower pole renal cyst noted.    VAS BAPERCENT 1.4 10/27/2021    NE 4.07 10/27/2021    LYMABS 2.62 10/27/2021    MOABSO 0.64 10/27/2021    EOABSO 0.51 10/27/2021    BAABSO 0.11 10/27/2021     Lab Results   Component Value Date     (H) 10/27/2021    BUN 12 10/27/2021    BUNCREA 14.5 04/

## 2021-11-18 ENCOUNTER — OFFICE VISIT (OUTPATIENT)
Dept: GASTROENTEROLOGY | Facility: CLINIC | Age: 41
End: 2021-11-18
Payer: COMMERCIAL

## 2021-11-18 VITALS
SYSTOLIC BLOOD PRESSURE: 113 MMHG | BODY MASS INDEX: 36.06 KG/M2 | HEIGHT: 61 IN | WEIGHT: 191 LBS | HEART RATE: 93 BPM | DIASTOLIC BLOOD PRESSURE: 79 MMHG

## 2021-11-18 DIAGNOSIS — K76.9 LIVER LESION: Primary | ICD-10-CM

## 2021-11-18 DIAGNOSIS — K21.9 GASTROESOPHAGEAL REFLUX DISEASE, UNSPECIFIED WHETHER ESOPHAGITIS PRESENT: ICD-10-CM

## 2021-11-18 DIAGNOSIS — Z98.890 STATUS POST SURGERY: ICD-10-CM

## 2021-11-18 DIAGNOSIS — R10.13 EPIGASTRIC ABDOMINAL PAIN: ICD-10-CM

## 2021-11-18 PROCEDURE — 3078F DIAST BP <80 MM HG: CPT | Performed by: INTERNAL MEDICINE

## 2021-11-18 PROCEDURE — 3074F SYST BP LT 130 MM HG: CPT | Performed by: INTERNAL MEDICINE

## 2021-11-18 PROCEDURE — 3008F BODY MASS INDEX DOCD: CPT | Performed by: INTERNAL MEDICINE

## 2021-11-18 PROCEDURE — 99214 OFFICE O/P EST MOD 30 MIN: CPT | Performed by: INTERNAL MEDICINE

## 2021-11-18 RX ORDER — SUCRALFATE 1 G/1
1 TABLET ORAL
Qty: 90 TABLET | Refills: 0 | Status: SHIPPED | OUTPATIENT
Start: 2021-11-18 | End: 2021-12-18

## 2021-11-18 NOTE — PATIENT INSTRUCTIONS
1. CT scan of abdomen  2. Carafate 1 gram three times a day  3.  Rabeprazole 20mg/day and pepcid at night

## 2021-11-18 NOTE — PROGRESS NOTES
[address] [facility] [address] Gastroenterology                                                                                                               Reason for consult: f removed.     Path  Small bowel biopsy:  No specific abnormality     TI biopsy:  No path abnormality     Random colon bx:  Multiple fragments of colonic mucosa w/ focal lymphoid follicles     0.9 cm hep flexure polyp:  ssa     Colon polyp at 25 cm:  Hyperpl biopsy   • OTHER SURGICAL HISTORY  8/19/14    Right ESWL- Dr. Nataly Tracy   • OTHER SURGICAL HISTORY  9/2/14    Right ESWL- Dr. Holly George   • OTHER SURGICAL HISTORY  9/8/14    Cysto Stent Removal- Dr. Holly George   • [id number] [facility] puffs into the lungs every 6 (six) hours as needed. • LOPERAMIDE HCL OR Take by mouth as needed.          Allergies:    Aluminum                RASH, OTHER (SEE COMMENTS)    Comment:Ulcers on skin  Azithromycin            ANGIOEDEMA, SWELLING  Biaxin [C no swelling of joints  SKIN: No jaundice, no erythema, no rashes  HEMATOLOGIC: No bleeding, no bruising  NEURO: Alert and interactive, normal gait    Labs/Imaging/Procedures:     Patient's pertinent labs and imaging were reviewed and discussed with patient Medical voice recognition dictation software. As a result, errors may occur. When identified, these errors have been corrected.  While every attempt is made to correct errors during dictation, discrepancies may still exist.

## 2021-12-01 ENCOUNTER — HOSPITAL ENCOUNTER (OUTPATIENT)
Dept: CT IMAGING | Age: 41
Discharge: HOME | End: 2021-12-01
Attending: INTERNAL MEDICINE
Payer: COMMERCIAL

## 2021-12-01 DIAGNOSIS — Z98.890 STATUS POST SURGERY: ICD-10-CM

## 2021-12-01 DIAGNOSIS — R10.13 EPIGASTRIC ABDOMINAL PAIN: ICD-10-CM

## 2021-12-01 DIAGNOSIS — K76.9 LIVER LESION: ICD-10-CM

## 2021-12-01 PROCEDURE — 74170 CT ABD WO CNTRST FLWD CNTRST: CPT | Performed by: INTERNAL MEDICINE

## 2021-12-03 ENCOUNTER — TELEPHONE (OUTPATIENT)
Dept: GASTROENTEROLOGY | Facility: CLINIC | Age: 41
End: 2021-12-03

## 2021-12-03 NOTE — TELEPHONE ENCOUNTER
D/w patient re: symptoms, still having burning in stomach. Not severe. We reviewed the CT she had done recently which shows no acute issues. We discussed options --including EGD, vs trial of FDgard vs Pamelor.  She chooses to try FDgard to see if burnin

## 2022-02-03 ENCOUNTER — LAB ENCOUNTER (OUTPATIENT)
Dept: LAB | Age: 42
End: 2022-02-03
Attending: INTERNAL MEDICINE
Payer: COMMERCIAL

## 2022-02-03 DIAGNOSIS — R53.82 CHRONIC FATIGUE: ICD-10-CM

## 2022-02-03 DIAGNOSIS — R76.8 ANA POSITIVE: ICD-10-CM

## 2022-02-03 DIAGNOSIS — M25.50 POLYARTHRALGIA: ICD-10-CM

## 2022-02-03 DIAGNOSIS — R23.1 LIVEDO RETICULARIS: ICD-10-CM

## 2022-02-03 LAB
CRP SERPL-MCNC: 0.54 MG/DL (ref ?–0.3)
ERYTHROCYTE [SEDIMENTATION RATE] IN BLOOD: 16 MM/HR

## 2022-02-03 PROCEDURE — 86038 ANTINUCLEAR ANTIBODIES: CPT

## 2022-02-03 PROCEDURE — 85652 RBC SED RATE AUTOMATED: CPT

## 2022-02-03 PROCEDURE — 83516 IMMUNOASSAY NONANTIBODY: CPT

## 2022-02-03 PROCEDURE — 86140 C-REACTIVE PROTEIN: CPT

## 2022-02-03 PROCEDURE — 36415 COLL VENOUS BLD VENIPUNCTURE: CPT

## 2022-02-07 LAB — NUCLEAR IGG TITR SER IF: NEGATIVE {TITER}

## 2022-02-08 LAB — RNA POLYMERASE III ANTIBODY IGG: 60 UNITS

## 2022-02-09 ENCOUNTER — OFFICE VISIT (OUTPATIENT)
Dept: RHEUMATOLOGY | Facility: CLINIC | Age: 42
End: 2022-02-09
Payer: COMMERCIAL

## 2022-02-09 VITALS
HEIGHT: 61 IN | DIASTOLIC BLOOD PRESSURE: 70 MMHG | HEART RATE: 100 BPM | TEMPERATURE: 99 F | RESPIRATION RATE: 16 BRPM | SYSTOLIC BLOOD PRESSURE: 116 MMHG | WEIGHT: 196 LBS | BODY MASS INDEX: 37 KG/M2 | OXYGEN SATURATION: 98 %

## 2022-02-09 DIAGNOSIS — M25.50 POLYARTHRALGIA: ICD-10-CM

## 2022-02-09 DIAGNOSIS — M54.2 NECK PAIN: ICD-10-CM

## 2022-02-09 DIAGNOSIS — G89.29 CHRONIC BILATERAL LOW BACK PAIN WITHOUT SCIATICA: ICD-10-CM

## 2022-02-09 DIAGNOSIS — R79.82 ELEVATED C-REACTIVE PROTEIN (CRP): ICD-10-CM

## 2022-02-09 DIAGNOSIS — M54.50 CHRONIC BILATERAL LOW BACK PAIN WITHOUT SCIATICA: ICD-10-CM

## 2022-02-09 DIAGNOSIS — M25.552 BILATERAL HIP PAIN: ICD-10-CM

## 2022-02-09 DIAGNOSIS — R53.82 CHRONIC FATIGUE: ICD-10-CM

## 2022-02-09 DIAGNOSIS — M35.9 UNDIFFERENTIATED CONNECTIVE TISSUE DISEASE (HCC): Primary | ICD-10-CM

## 2022-02-09 DIAGNOSIS — M25.551 BILATERAL HIP PAIN: ICD-10-CM

## 2022-02-09 DIAGNOSIS — R23.1 LIVEDO RETICULARIS: ICD-10-CM

## 2022-02-09 PROCEDURE — 99215 OFFICE O/P EST HI 40 MIN: CPT | Performed by: INTERNAL MEDICINE

## 2022-02-09 PROCEDURE — 3078F DIAST BP <80 MM HG: CPT | Performed by: INTERNAL MEDICINE

## 2022-02-09 PROCEDURE — 3074F SYST BP LT 130 MM HG: CPT | Performed by: INTERNAL MEDICINE

## 2022-02-09 PROCEDURE — 3008F BODY MASS INDEX DOCD: CPT | Performed by: INTERNAL MEDICINE

## 2022-02-09 RX ORDER — SUCRALFATE 1 G/1
TABLET ORAL
COMMUNITY
Start: 2021-11-18

## 2022-02-09 RX ORDER — NAPROXEN 500 MG/1
500 TABLET ORAL 2 TIMES DAILY
COMMUNITY
Start: 2022-01-17

## 2022-02-09 RX ORDER — FLUOXETINE 20 MG/1
20 TABLET ORAL DAILY
COMMUNITY
Start: 2021-12-22

## 2022-02-09 RX ORDER — PREDNISONE 1 MG/1
TABLET ORAL
Qty: 30 TABLET | Refills: 0 | Status: SHIPPED | OUTPATIENT
Start: 2022-02-09

## 2022-02-09 RX ORDER — CARISOPRODOL 350 MG/1
TABLET ORAL
COMMUNITY
Start: 2022-01-17

## 2022-02-18 ENCOUNTER — OFFICE VISIT (OUTPATIENT)
Dept: GASTROENTEROLOGY | Facility: CLINIC | Age: 42
End: 2022-02-18
Payer: COMMERCIAL

## 2022-02-18 VITALS
HEIGHT: 61 IN | BODY MASS INDEX: 37.19 KG/M2 | WEIGHT: 197 LBS | HEART RATE: 92 BPM | SYSTOLIC BLOOD PRESSURE: 134 MMHG | DIASTOLIC BLOOD PRESSURE: 88 MMHG

## 2022-02-18 DIAGNOSIS — K59.04 CHRONIC IDIOPATHIC CONSTIPATION: ICD-10-CM

## 2022-02-18 DIAGNOSIS — R10.30 LOWER ABDOMINAL PAIN: ICD-10-CM

## 2022-02-18 DIAGNOSIS — K21.9 GASTROESOPHAGEAL REFLUX DISEASE WITHOUT ESOPHAGITIS: Primary | ICD-10-CM

## 2022-02-18 PROCEDURE — 99214 OFFICE O/P EST MOD 30 MIN: CPT | Performed by: INTERNAL MEDICINE

## 2022-02-18 PROCEDURE — 3075F SYST BP GE 130 - 139MM HG: CPT | Performed by: INTERNAL MEDICINE

## 2022-02-18 PROCEDURE — 3079F DIAST BP 80-89 MM HG: CPT | Performed by: INTERNAL MEDICINE

## 2022-02-18 PROCEDURE — 3008F BODY MASS INDEX DOCD: CPT | Performed by: INTERNAL MEDICINE

## 2022-02-18 RX ORDER — HYDROXYCHLOROQUINE SULFATE 200 MG/1
TABLET, FILM COATED ORAL
COMMUNITY
Start: 2022-02-12

## 2022-02-18 RX ORDER — OMEPRAZOLE 40 MG/1
40 CAPSULE, DELAYED RELEASE ORAL 2 TIMES DAILY
Qty: 60 CAPSULE | Refills: 2 | Status: SHIPPED | OUTPATIENT
Start: 2022-02-18 | End: 2022-05-19

## 2022-02-18 NOTE — PATIENT INSTRUCTIONS
1. Avoid caffeine, chocolate, peppermint, and alcohol. Also avoid lying down flat or in a recumbent position for 3 hours after a meal. Quit smoking. 2. If not improving will need upper endoscopy    3. Avoid medications like Naproxen    4. Omeprazole 40mg twice a day    5. Go for sleep study! 6. Wash out + metamucil     ----------    WASHOUT INSTRUCTIONS:  1. Pick a day you will be at home, close to a toilet. 2. Have a some juice for breakfast.   3. Around 10AM start drinking the solution prescribed (for trilyte, drink 1 cup every 15 minutes) over the course of 3-5 hours. IF having nausea/bloating, take a break for 30 minutes, walk around and then resume drinking. If vomiting, take a break for 1 hour, if symptoms improve, and you feel well, can resume drinking. 4. Goal is to finish solution or until stools turn liquid yellow. 5. For lunch you should have a liquid diet (7up, water, gingerale, etc)  6. After prep, for dinner you can have a light dinner. 7. Resume regular meals the following day, along with laxative medications. 8. You should continue your regular medications during the washout day.

## 2022-03-02 ENCOUNTER — HOSPITAL ENCOUNTER (OUTPATIENT)
Dept: GENERAL RADIOLOGY | Age: 42
Discharge: HOME | End: 2022-03-02
Attending: INTERNAL MEDICINE
Payer: COMMERCIAL

## 2022-03-02 DIAGNOSIS — M25.551 BILATERAL HIP PAIN: ICD-10-CM

## 2022-03-02 DIAGNOSIS — G89.29 CHRONIC BILATERAL LOW BACK PAIN WITHOUT SCIATICA: ICD-10-CM

## 2022-03-02 DIAGNOSIS — M25.552 BILATERAL HIP PAIN: ICD-10-CM

## 2022-03-02 DIAGNOSIS — M54.50 CHRONIC BILATERAL LOW BACK PAIN WITHOUT SCIATICA: ICD-10-CM

## 2022-03-02 DIAGNOSIS — M54.2 NECK PAIN: ICD-10-CM

## 2022-03-02 PROCEDURE — 72110 X-RAY EXAM L-2 SPINE 4/>VWS: CPT | Performed by: INTERNAL MEDICINE

## 2022-03-02 PROCEDURE — 73522 X-RAY EXAM HIPS BI 3-4 VIEWS: CPT | Performed by: INTERNAL MEDICINE

## 2022-03-02 PROCEDURE — 72052 X-RAY EXAM NECK SPINE 6/>VWS: CPT | Performed by: INTERNAL MEDICINE

## 2022-04-06 ENCOUNTER — PATIENT MESSAGE (OUTPATIENT)
Dept: RHEUMATOLOGY | Facility: CLINIC | Age: 42
End: 2022-04-06

## 2022-04-08 ENCOUNTER — LAB ENCOUNTER (OUTPATIENT)
Dept: LAB | Age: 42
End: 2022-04-08
Attending: INTERNAL MEDICINE
Payer: COMMERCIAL

## 2022-04-08 DIAGNOSIS — M35.9 UNDIFFERENTIATED CONNECTIVE TISSUE DISEASE (HCC): ICD-10-CM

## 2022-04-08 DIAGNOSIS — R53.82 CHRONIC FATIGUE: ICD-10-CM

## 2022-04-08 DIAGNOSIS — R79.82 ELEVATED C-REACTIVE PROTEIN (CRP): ICD-10-CM

## 2022-04-08 LAB
ALBUMIN SERPL-MCNC: 3.9 G/DL (ref 3.4–5)
ALBUMIN/GLOB SERPL: 1 {RATIO} (ref 1–2)
ALP LIVER SERPL-CCNC: 77 U/L
ALT SERPL-CCNC: 33 U/L
ANION GAP SERPL CALC-SCNC: 6 MMOL/L (ref 0–18)
AST SERPL-CCNC: 16 U/L (ref 15–37)
BASOPHILS # BLD AUTO: 0.05 X10(3) UL (ref 0–0.2)
BASOPHILS NFR BLD AUTO: 0.9 %
BILIRUB SERPL-MCNC: 0.4 MG/DL (ref 0.1–2)
BUN BLD-MCNC: 12 MG/DL (ref 7–18)
BUN/CREAT SERPL: 14.6 (ref 10–20)
CALCIUM BLD-MCNC: 9.5 MG/DL (ref 8.5–10.1)
CHLORIDE SERPL-SCNC: 107 MMOL/L (ref 98–112)
CO2 SERPL-SCNC: 26 MMOL/L (ref 21–32)
CREAT BLD-MCNC: 0.82 MG/DL
CRP SERPL-MCNC: 0.43 MG/DL (ref ?–0.3)
DEPRECATED RDW RBC AUTO: 46.8 FL (ref 35.1–46.3)
EOSINOPHIL # BLD AUTO: 0.09 X10(3) UL (ref 0–0.7)
EOSINOPHIL NFR BLD AUTO: 1.6 %
ERYTHROCYTE [DISTWIDTH] IN BLOOD BY AUTOMATED COUNT: 13.9 % (ref 11–15)
ERYTHROCYTE [SEDIMENTATION RATE] IN BLOOD: 16 MM/HR
FASTING STATUS PATIENT QL REPORTED: NO
GLOBULIN PLAS-MCNC: 3.8 G/DL (ref 2.8–4.4)
GLUCOSE BLD-MCNC: 96 MG/DL (ref 70–99)
HCT VFR BLD AUTO: 46 %
HGB BLD-MCNC: 14.9 G/DL
IMM GRANULOCYTES # BLD AUTO: 0.01 X10(3) UL (ref 0–1)
IMM GRANULOCYTES NFR BLD: 0.2 %
LYMPHOCYTES # BLD AUTO: 1.9 X10(3) UL (ref 1–4)
LYMPHOCYTES NFR BLD AUTO: 33.2 %
MCH RBC QN AUTO: 29.3 PG (ref 26–34)
MCHC RBC AUTO-ENTMCNC: 32.4 G/DL (ref 31–37)
MCV RBC AUTO: 90.4 FL
MONOCYTES # BLD AUTO: 0.54 X10(3) UL (ref 0.1–1)
MONOCYTES NFR BLD AUTO: 9.4 %
NEUTROPHILS # BLD AUTO: 3.14 X10 (3) UL (ref 1.5–7.7)
NEUTROPHILS # BLD AUTO: 3.14 X10(3) UL (ref 1.5–7.7)
NEUTROPHILS NFR BLD AUTO: 54.7 %
OSMOLALITY SERPL CALC.SUM OF ELEC: 288 MOSM/KG (ref 275–295)
PLATELET # BLD AUTO: 303 10(3)UL (ref 150–450)
POTASSIUM SERPL-SCNC: 3.9 MMOL/L (ref 3.5–5.1)
PROT SERPL-MCNC: 7.7 G/DL (ref 6.4–8.2)
RBC # BLD AUTO: 5.09 X10(6)UL
SODIUM SERPL-SCNC: 139 MMOL/L (ref 136–145)
WBC # BLD AUTO: 5.7 X10(3) UL (ref 4–11)

## 2022-04-08 PROCEDURE — 85025 COMPLETE CBC W/AUTO DIFF WBC: CPT

## 2022-04-08 PROCEDURE — 83516 IMMUNOASSAY NONANTIBODY: CPT

## 2022-04-08 PROCEDURE — 36415 COLL VENOUS BLD VENIPUNCTURE: CPT

## 2022-04-08 PROCEDURE — 86140 C-REACTIVE PROTEIN: CPT

## 2022-04-08 PROCEDURE — 86038 ANTINUCLEAR ANTIBODIES: CPT

## 2022-04-08 PROCEDURE — 85652 RBC SED RATE AUTOMATED: CPT

## 2022-04-08 PROCEDURE — 80053 COMPREHEN METABOLIC PANEL: CPT

## 2022-04-11 LAB — NUCLEAR IGG TITR SER IF: NEGATIVE {TITER}

## 2022-04-12 LAB — RNA POLYMERASE III ANTIBODY IGG: 64 UNITS

## 2022-05-18 ENCOUNTER — OFFICE VISIT (OUTPATIENT)
Dept: RHEUMATOLOGY | Facility: CLINIC | Age: 42
End: 2022-05-18
Payer: COMMERCIAL

## 2022-05-18 VITALS
WEIGHT: 199 LBS | TEMPERATURE: 98 F | RESPIRATION RATE: 16 BRPM | BODY MASS INDEX: 37.57 KG/M2 | OXYGEN SATURATION: 98 % | SYSTOLIC BLOOD PRESSURE: 120 MMHG | HEIGHT: 61 IN | DIASTOLIC BLOOD PRESSURE: 81 MMHG | HEART RATE: 86 BPM

## 2022-05-18 DIAGNOSIS — E55.9 VITAMIN D DEFICIENCY: ICD-10-CM

## 2022-05-18 DIAGNOSIS — M35.9 UNDIFFERENTIATED CONNECTIVE TISSUE DISEASE (HCC): Primary | ICD-10-CM

## 2022-05-18 DIAGNOSIS — G89.29 CHRONIC PAIN OF RIGHT KNEE: ICD-10-CM

## 2022-05-18 DIAGNOSIS — M54.2 NECK PAIN: ICD-10-CM

## 2022-05-18 DIAGNOSIS — M25.561 CHRONIC PAIN OF RIGHT KNEE: ICD-10-CM

## 2022-05-18 DIAGNOSIS — R53.82 CHRONIC FATIGUE: ICD-10-CM

## 2022-05-18 DIAGNOSIS — M25.50 POLYARTHRALGIA: ICD-10-CM

## 2022-05-18 PROCEDURE — 3074F SYST BP LT 130 MM HG: CPT | Performed by: INTERNAL MEDICINE

## 2022-05-18 PROCEDURE — 3079F DIAST BP 80-89 MM HG: CPT | Performed by: INTERNAL MEDICINE

## 2022-05-18 PROCEDURE — 99214 OFFICE O/P EST MOD 30 MIN: CPT | Performed by: INTERNAL MEDICINE

## 2022-05-18 PROCEDURE — 3008F BODY MASS INDEX DOCD: CPT | Performed by: INTERNAL MEDICINE

## 2022-05-18 RX ORDER — FLUOXETINE 10 MG/1
10 CAPSULE ORAL DAILY
COMMUNITY
Start: 2022-04-16

## 2022-05-19 NOTE — PATIENT INSTRUCTIONS
-- follow up with GI regarding persistent symptoms  -- talk to provider about increasing fluoxetine dosing  -- in the meantime, consider CBD/melatonin at night  -- start physical therapy   -- consider 2nd opinion at [place]   -- labs prior to next office visit  -- follow up in 3 months or sooner as needed  -- call with questions/concerns    Dr. Lucina Goodman

## 2022-05-25 ENCOUNTER — LAB REQUISITION (OUTPATIENT)
Dept: LAB | Age: 42
End: 2022-05-25

## 2022-05-25 DIAGNOSIS — R35.0 FREQUENCY OF MICTURITION: ICD-10-CM

## 2022-05-25 PROCEDURE — 87086 URINE CULTURE/COLONY COUNT: CPT | Performed by: CLINICAL MEDICAL LABORATORY

## 2022-05-25 PROCEDURE — 87186 SC STD MICRODIL/AGAR DIL: CPT | Performed by: CLINICAL MEDICAL LABORATORY

## 2022-05-25 PROCEDURE — 81001 URINALYSIS AUTO W/SCOPE: CPT | Performed by: CLINICAL MEDICAL LABORATORY

## 2022-05-25 PROCEDURE — 87088 URINE BACTERIA CULTURE: CPT | Performed by: CLINICAL MEDICAL LABORATORY

## 2022-05-25 PROCEDURE — 87077 CULTURE AEROBIC IDENTIFY: CPT | Performed by: CLINICAL MEDICAL LABORATORY

## 2022-05-26 LAB
APPEARANCE UR: CLEAR
BACTERIA #/AREA URNS HPF: ABNORMAL /HPF
BILIRUB UR QL STRIP: NEGATIVE
COLOR UR: YELLOW
GLUCOSE UR STRIP-MCNC: NEGATIVE MG/DL
HGB UR QL STRIP: NEGATIVE
HYALINE CASTS #/AREA URNS LPF: ABNORMAL /LPF
KETONES UR STRIP-MCNC: NEGATIVE MG/DL
LEUKOCYTE ESTERASE UR QL STRIP: ABNORMAL
MUCOUS THREADS URNS QL MICRO: PRESENT
NITRITE UR QL STRIP: NEGATIVE
PH UR STRIP: 6 [PH] (ref 5–7)
PROT UR STRIP-MCNC: NEGATIVE MG/DL
RBC #/AREA URNS HPF: ABNORMAL /HPF
SP GR UR STRIP: 1.01 (ref 1–1.03)
SQUAMOUS #/AREA URNS HPF: ABNORMAL /HPF
UROBILINOGEN UR STRIP-MCNC: 0.2 MG/DL
WBC #/AREA URNS HPF: ABNORMAL /HPF

## 2022-05-29 LAB
BACTERIA UR CULT: ABNORMAL
BACTERIA UR CULT: ABNORMAL

## 2022-06-07 ENCOUNTER — TELEPHONE (OUTPATIENT)
Dept: RHEUMATOLOGY | Facility: CLINIC | Age: 42
End: 2022-06-07

## 2022-06-08 ENCOUNTER — LAB ENCOUNTER (OUTPATIENT)
Dept: LAB | Age: 42
End: 2022-06-08
Attending: OBSTETRICS & GYNECOLOGY
Payer: COMMERCIAL

## 2022-06-08 ENCOUNTER — HOSPITAL ENCOUNTER (OUTPATIENT)
Dept: MAMMOGRAPHY | Age: 42
Discharge: HOME | End: 2022-06-08
Attending: OBSTETRICS & GYNECOLOGY
Payer: COMMERCIAL

## 2022-06-08 DIAGNOSIS — Z12.31 SCREENING MAMMOGRAM FOR BREAST CANCER: ICD-10-CM

## 2022-06-08 DIAGNOSIS — R10.2 PELVIC PAIN: Primary | ICD-10-CM

## 2022-06-08 DIAGNOSIS — D25.1 INTRAMURAL UTERINE FIBROID: ICD-10-CM

## 2022-06-08 LAB
BUN BLD-MCNC: 11 MG/DL (ref 7–18)
CREAT BLD-MCNC: 0.82 MG/DL

## 2022-06-08 PROCEDURE — 77067 SCR MAMMO BI INCL CAD: CPT | Performed by: OBSTETRICS & GYNECOLOGY

## 2022-06-08 PROCEDURE — 82565 ASSAY OF CREATININE: CPT

## 2022-06-08 PROCEDURE — 77063 BREAST TOMOSYNTHESIS BI: CPT | Performed by: OBSTETRICS & GYNECOLOGY

## 2022-06-08 PROCEDURE — 36415 COLL VENOUS BLD VENIPUNCTURE: CPT

## 2022-06-08 PROCEDURE — 84520 ASSAY OF UREA NITROGEN: CPT

## 2022-07-06 ENCOUNTER — HOSPITAL ENCOUNTER (OUTPATIENT)
Dept: MAMMOGRAPHY | Facility: HOSPITAL | Age: 42
Discharge: HOME | End: 2022-07-06
Attending: OBSTETRICS & GYNECOLOGY
Payer: COMMERCIAL

## 2022-07-06 DIAGNOSIS — R92.8 ABNORMAL MAMMOGRAM: ICD-10-CM

## 2022-07-06 PROCEDURE — 77066 DX MAMMO INCL CAD BI: CPT | Performed by: OBSTETRICS & GYNECOLOGY

## 2022-07-06 PROCEDURE — 77062 BREAST TOMOSYNTHESIS BI: CPT | Performed by: OBSTETRICS & GYNECOLOGY

## 2022-07-13 ENCOUNTER — HOSPITAL ENCOUNTER (OUTPATIENT)
Dept: MRI IMAGING | Facility: HOSPITAL | Age: 42
Discharge: HOME | End: 2022-07-13
Attending: OBSTETRICS & GYNECOLOGY
Payer: COMMERCIAL

## 2022-07-13 DIAGNOSIS — D25.1 INTRAMURAL UTERINE FIBROID: ICD-10-CM

## 2022-07-13 DIAGNOSIS — R10.2 PELVIC PAIN IN FEMALE: ICD-10-CM

## 2022-07-13 PROCEDURE — 74183 MRI ABD W/O CNTR FLWD CNTR: CPT | Performed by: OBSTETRICS & GYNECOLOGY

## 2022-07-13 PROCEDURE — A9575 INJ GADOTERATE MEGLUMI 0.1ML: HCPCS | Performed by: OBSTETRICS & GYNECOLOGY

## 2022-07-13 PROCEDURE — 72197 MRI PELVIS W/O & W/DYE: CPT | Performed by: OBSTETRICS & GYNECOLOGY

## 2022-07-22 ENCOUNTER — TELEPHONE (OUTPATIENT)
Dept: GASTROENTEROLOGY | Facility: CLINIC | Age: 42
End: 2022-07-22

## 2022-07-22 NOTE — TELEPHONE ENCOUNTER
Dr. Pepe Maldonado,    Fax received from [address] [place] [address], office notes from visit on 7/18/22. Placed on your desk for review, thank you!

## 2022-07-27 ENCOUNTER — PATIENT MESSAGE (OUTPATIENT)
Dept: RHEUMATOLOGY | Facility: CLINIC | Age: 42
End: 2022-07-27

## 2022-08-01 ENCOUNTER — LAB ENCOUNTER (OUTPATIENT)
Dept: LAB | Age: 42
End: 2022-08-01
Attending: INTERNAL MEDICINE
Payer: COMMERCIAL

## 2022-08-01 DIAGNOSIS — M35.9 UNDIFFERENTIATED CONNECTIVE TISSUE DISEASE (HCC): ICD-10-CM

## 2022-08-01 DIAGNOSIS — M25.50 POLYARTHRALGIA: ICD-10-CM

## 2022-08-01 DIAGNOSIS — R53.82 CHRONIC FATIGUE: ICD-10-CM

## 2022-08-01 DIAGNOSIS — E55.9 VITAMIN D DEFICIENCY: ICD-10-CM

## 2022-08-01 LAB
ALBUMIN SERPL-MCNC: 3.6 G/DL (ref 3.4–5)
ALBUMIN/GLOB SERPL: 0.9 {RATIO} (ref 1–2)
ALP LIVER SERPL-CCNC: 78 U/L
ALT SERPL-CCNC: 41 U/L
ANION GAP SERPL CALC-SCNC: 6 MMOL/L (ref 0–18)
AST SERPL-CCNC: 21 U/L (ref 15–37)
BASOPHILS # BLD AUTO: 0.06 X10(3) UL (ref 0–0.2)
BASOPHILS NFR BLD AUTO: 0.8 %
BILIRUB SERPL-MCNC: 0.2 MG/DL (ref 0.1–2)
BUN BLD-MCNC: 14 MG/DL (ref 7–18)
BUN/CREAT SERPL: 17.7 (ref 10–20)
CALCIUM BLD-MCNC: 8.9 MG/DL (ref 8.5–10.1)
CHLORIDE SERPL-SCNC: 109 MMOL/L (ref 98–112)
CO2 SERPL-SCNC: 22 MMOL/L (ref 21–32)
CREAT BLD-MCNC: 0.79 MG/DL
CRP SERPL-MCNC: 0.59 MG/DL (ref ?–0.3)
DEPRECATED RDW RBC AUTO: 46.9 FL (ref 35.1–46.3)
EOSINOPHIL # BLD AUTO: 0.09 X10(3) UL (ref 0–0.7)
EOSINOPHIL NFR BLD AUTO: 1.2 %
ERYTHROCYTE [DISTWIDTH] IN BLOOD BY AUTOMATED COUNT: 14.1 % (ref 11–15)
ERYTHROCYTE [SEDIMENTATION RATE] IN BLOOD: 13 MM/HR
FASTING STATUS PATIENT QL REPORTED: NO
GLOBULIN PLAS-MCNC: 3.8 G/DL (ref 2.8–4.4)
GLUCOSE BLD-MCNC: 76 MG/DL (ref 70–99)
HCT VFR BLD AUTO: 43.8 %
HGB BLD-MCNC: 14.1 G/DL
IMM GRANULOCYTES # BLD AUTO: 0.01 X10(3) UL (ref 0–1)
IMM GRANULOCYTES NFR BLD: 0.1 %
LYMPHOCYTES # BLD AUTO: 2.54 X10(3) UL (ref 1–4)
LYMPHOCYTES NFR BLD AUTO: 34.3 %
MCH RBC QN AUTO: 29.2 PG (ref 26–34)
MCHC RBC AUTO-ENTMCNC: 32.2 G/DL (ref 31–37)
MCV RBC AUTO: 90.7 FL
MONOCYTES # BLD AUTO: 0.74 X10(3) UL (ref 0.1–1)
MONOCYTES NFR BLD AUTO: 10 %
NEUTROPHILS # BLD AUTO: 3.97 X10 (3) UL (ref 1.5–7.7)
NEUTROPHILS # BLD AUTO: 3.97 X10(3) UL (ref 1.5–7.7)
NEUTROPHILS NFR BLD AUTO: 53.6 %
OSMOLALITY SERPL CALC.SUM OF ELEC: 283 MOSM/KG (ref 275–295)
PLATELET # BLD AUTO: 281 10(3)UL (ref 150–450)
POTASSIUM SERPL-SCNC: 3.8 MMOL/L (ref 3.5–5.1)
PROT SERPL-MCNC: 7.4 G/DL (ref 6.4–8.2)
RBC # BLD AUTO: 4.83 X10(6)UL
SODIUM SERPL-SCNC: 137 MMOL/L (ref 136–145)
VIT B12 SERPL-MCNC: 466 PG/ML (ref 193–986)
VIT D+METAB SERPL-MCNC: 24.4 NG/ML (ref 30–100)
WBC # BLD AUTO: 7.4 X10(3) UL (ref 4–11)

## 2022-08-01 PROCEDURE — 85025 COMPLETE CBC W/AUTO DIFF WBC: CPT

## 2022-08-01 PROCEDURE — 86140 C-REACTIVE PROTEIN: CPT

## 2022-08-01 PROCEDURE — 82306 VITAMIN D 25 HYDROXY: CPT

## 2022-08-01 PROCEDURE — 86038 ANTINUCLEAR ANTIBODIES: CPT

## 2022-08-01 PROCEDURE — 82607 VITAMIN B-12: CPT

## 2022-08-01 PROCEDURE — 85652 RBC SED RATE AUTOMATED: CPT

## 2022-08-01 PROCEDURE — 36415 COLL VENOUS BLD VENIPUNCTURE: CPT

## 2022-08-01 PROCEDURE — 83516 IMMUNOASSAY NONANTIBODY: CPT

## 2022-08-01 PROCEDURE — 80053 COMPREHEN METABOLIC PANEL: CPT

## 2022-08-02 ENCOUNTER — TELEPHONE (OUTPATIENT)
Dept: RHEUMATOLOGY | Facility: CLINIC | Age: 42
End: 2022-08-02

## 2022-08-02 DIAGNOSIS — E55.9 VITAMIN D DEFICIENCY: Primary | ICD-10-CM

## 2022-08-02 RX ORDER — ERGOCALCIFEROL 1.25 MG/1
50000 CAPSULE ORAL WEEKLY
Qty: 12 CAPSULE | Refills: 0 | Status: SHIPPED | OUTPATIENT
Start: 2022-08-02 | End: 2022-10-25

## 2022-08-04 LAB
NUCLEAR IGG TITR SER IF: NEGATIVE {TITER}
RNA POLYMERASE III ANTIBODY IGG: 59 UNITS

## 2022-08-27 ENCOUNTER — HOSPITAL ENCOUNTER (OUTPATIENT)
Age: 42
Discharge: HOME | End: 2022-08-27
Attending: EMERGENCY MEDICINE
Payer: COMMERCIAL

## 2022-08-27 ENCOUNTER — APPOINTMENT (OUTPATIENT)
Dept: CT IMAGING | Age: 42
End: 2022-08-27
Attending: EMERGENCY MEDICINE
Payer: COMMERCIAL

## 2022-08-27 VITALS
OXYGEN SATURATION: 99 % | SYSTOLIC BLOOD PRESSURE: 121 MMHG | HEART RATE: 76 BPM | RESPIRATION RATE: 18 BRPM | DIASTOLIC BLOOD PRESSURE: 77 MMHG | TEMPERATURE: 98 F

## 2022-08-27 DIAGNOSIS — M54.6 ACUTE RIGHT-SIDED THORACIC BACK PAIN: Primary | ICD-10-CM

## 2022-08-27 LAB
B-HCG UR QL: NEGATIVE
BASOPHILS # BLD AUTO: 0.04 X10(3) UL (ref 0–0.2)
BASOPHILS NFR BLD AUTO: 0.6 %
BILIRUB UR QL STRIP: NEGATIVE
BUN BLD-MCNC: 8 MG/DL (ref 7–18)
CHLORIDE BLD-SCNC: 104 MMOL/L (ref 98–112)
CLARITY UR: CLEAR
CO2 BLD-SCNC: 22 MMOL/L (ref 21–32)
COLOR UR: YELLOW
CREAT BLD-MCNC: 0.7 MG/DL
DDIMER WHOLE BLOOD: <200 NG/ML DDU (ref ?–400)
DEPRECATED RDW RBC AUTO: 45.6 FL (ref 35.1–46.3)
EOSINOPHIL # BLD AUTO: 0.1 X10(3) UL (ref 0–0.7)
EOSINOPHIL NFR BLD AUTO: 1.4 %
ERYTHROCYTE [DISTWIDTH] IN BLOOD BY AUTOMATED COUNT: 13.8 % (ref 11–15)
GFR SERPLBLD BASED ON 1.73 SQ M-ARVRAT: 111 ML/MIN/1.73M2 (ref 60–?)
GLUCOSE BLD-MCNC: 76 MG/DL (ref 70–99)
GLUCOSE UR STRIP-MCNC: NEGATIVE MG/DL
HCT VFR BLD AUTO: 41.4 %
HCT VFR BLD AUTO: 41.6 %
HCT VFR BLD CALC: 42 %
HGB BLD-MCNC: 13.6 G/DL
HGB BLD-MCNC: 13.7 G/DL
HGB UR QL STRIP: NEGATIVE
IMM GRANULOCYTES # BLD AUTO: 0.02 X10(3) UL (ref 0–1)
IMM GRANULOCYTES NFR BLD: 0.3 %
ISTAT IONIZED CALCIUM FOR CHEM 8: 1.16 MMOL/L (ref 1.12–1.32)
KETONES UR STRIP-MCNC: NEGATIVE MG/DL
LEUKOCYTE ESTERASE UR QL STRIP: NEGATIVE
LYMPHOCYTES # BLD AUTO: 2.14 X10(3) UL (ref 1–4)
LYMPHOCYTES NFR BLD AUTO: 29.9 %
MCH RBC QN AUTO: 29.3 PG (ref 26–34)
MCH RBC QN AUTO: 29.7 PG (ref 26–34)
MCHC RBC AUTO-ENTMCNC: 32.9 G/DL (ref 31–37)
MCHC RBC AUTO-ENTMCNC: 32.9 G/DL (ref 31–37)
MCV RBC AUTO: 89.2 FL (ref 80–100)
MCV RBC AUTO: 90.2 FL
MONOCYTES # BLD AUTO: 0.66 X10(3) UL (ref 0.1–1)
MONOCYTES NFR BLD AUTO: 9.2 %
NEUTROPHILS # BLD AUTO: 4.2 X10 (3) UL (ref 1.5–7.7)
NEUTROPHILS # BLD AUTO: 4.2 X10(3) UL (ref 1.5–7.7)
NEUTROPHILS NFR BLD AUTO: 58.6 %
NITRITE UR QL STRIP: NEGATIVE
PH UR STRIP: 6.5 [PH]
PLATELET # BLD AUTO: 266 X10ˆ3/UL (ref 150–450)
PLATELET # BLD AUTO: 274 10(3)UL (ref 150–450)
POTASSIUM BLD-SCNC: 3.7 MMOL/L (ref 3.6–5.1)
PROT UR STRIP-MCNC: NEGATIVE MG/DL
RBC # BLD AUTO: 4.61 X10(6)UL
RBC # BLD AUTO: 4.64 X10ˆ6/UL
SODIUM BLD-SCNC: 140 MMOL/L (ref 136–145)
SP GR UR STRIP: 1.01
TROPONIN I BLD-MCNC: <0.02 NG/ML
UROBILINOGEN UR STRIP-ACNC: <2 MG/DL
WBC # BLD AUTO: 6.7 X10ˆ3/UL (ref 4–11)
WBC # BLD AUTO: 7.2 X10(3) UL (ref 4–11)

## 2022-08-27 PROCEDURE — 99215 OFFICE O/P EST HI 40 MIN: CPT

## 2022-08-27 PROCEDURE — 93010 ELECTROCARDIOGRAM REPORT: CPT | Performed by: EMERGENCY MEDICINE

## 2022-08-27 PROCEDURE — 81025 URINE PREGNANCY TEST: CPT

## 2022-08-27 PROCEDURE — 99214 OFFICE O/P EST MOD 30 MIN: CPT

## 2022-08-27 PROCEDURE — 74176 CT ABD & PELVIS W/O CONTRAST: CPT | Performed by: EMERGENCY MEDICINE

## 2022-08-27 PROCEDURE — 85378 FIBRIN DEGRADE SEMIQUANT: CPT | Performed by: EMERGENCY MEDICINE

## 2022-08-27 PROCEDURE — 80047 BASIC METABLC PNL IONIZED CA: CPT

## 2022-08-27 PROCEDURE — 84484 ASSAY OF TROPONIN QUANT: CPT

## 2022-08-27 PROCEDURE — 36415 COLL VENOUS BLD VENIPUNCTURE: CPT

## 2022-08-27 PROCEDURE — 93005 ELECTROCARDIOGRAM TRACING: CPT

## 2022-08-27 PROCEDURE — 81002 URINALYSIS NONAUTO W/O SCOPE: CPT

## 2022-08-27 PROCEDURE — 85025 COMPLETE CBC W/AUTO DIFF WBC: CPT | Performed by: EMERGENCY MEDICINE

## 2022-08-27 RX ORDER — CYCLOBENZAPRINE HCL 5 MG
5 TABLET ORAL 3 TIMES DAILY PRN
Qty: 21 TABLET | Refills: 0 | Status: SHIPPED | OUTPATIENT
Start: 2022-08-27 | End: 2022-09-03

## 2022-08-27 NOTE — ED INITIAL ASSESSMENT (HPI)
Pt comes in for eval of back pain since Wednesday. Reports pain in mid R back. Denies fever chills. Reports some urinary urgency and frequency. Reports pain worse with movement. Denies known injury.

## 2022-09-21 ENCOUNTER — OFFICE VISIT (OUTPATIENT)
Dept: RHEUMATOLOGY | Facility: CLINIC | Age: 42
End: 2022-09-21

## 2022-09-21 VITALS
RESPIRATION RATE: 16 BRPM | WEIGHT: 192 LBS | DIASTOLIC BLOOD PRESSURE: 62 MMHG | HEIGHT: 61 IN | HEART RATE: 84 BPM | OXYGEN SATURATION: 97 % | SYSTOLIC BLOOD PRESSURE: 114 MMHG | TEMPERATURE: 98 F | BODY MASS INDEX: 36.25 KG/M2

## 2022-09-21 DIAGNOSIS — M54.2 NECK PAIN: ICD-10-CM

## 2022-09-21 DIAGNOSIS — M25.50 POLYARTHRALGIA: ICD-10-CM

## 2022-09-21 DIAGNOSIS — R76.8 ANA POSITIVE: ICD-10-CM

## 2022-09-21 DIAGNOSIS — R53.82 CHRONIC FATIGUE: ICD-10-CM

## 2022-09-21 DIAGNOSIS — R79.82 ELEVATED C-REACTIVE PROTEIN (CRP): ICD-10-CM

## 2022-09-21 DIAGNOSIS — M35.9 UNDIFFERENTIATED CONNECTIVE TISSUE DISEASE (HCC): Primary | ICD-10-CM

## 2022-09-21 PROCEDURE — 3074F SYST BP LT 130 MM HG: CPT | Performed by: INTERNAL MEDICINE

## 2022-09-21 PROCEDURE — 99214 OFFICE O/P EST MOD 30 MIN: CPT | Performed by: INTERNAL MEDICINE

## 2022-09-21 PROCEDURE — 3078F DIAST BP <80 MM HG: CPT | Performed by: INTERNAL MEDICINE

## 2022-09-21 PROCEDURE — 3008F BODY MASS INDEX DOCD: CPT | Performed by: INTERNAL MEDICINE

## 2022-09-21 RX ORDER — FLUOXETINE HYDROCHLORIDE 20 MG/1
20 CAPSULE ORAL DAILY
COMMUNITY
Start: 2022-07-13

## 2022-09-30 RX ORDER — ALBUTEROL SULFATE 90 UG/1
2 AEROSOL, METERED RESPIRATORY (INHALATION) EVERY 4 HOURS PRN
COMMUNITY

## 2022-09-30 RX ORDER — FAMOTIDINE 20 MG/1
20 TABLET, FILM COATED ORAL 2 TIMES DAILY
COMMUNITY

## 2022-09-30 RX ORDER — CARISOPRODOL 350 MG/1
350 TABLET ORAL 4 TIMES DAILY PRN
COMMUNITY

## 2022-09-30 RX ORDER — ERGOCALCIFEROL 1.25 MG/1
1.25 CAPSULE ORAL
COMMUNITY

## 2022-09-30 RX ORDER — SUCRALFATE 1 G/1
1 TABLET ORAL PRN
COMMUNITY

## 2022-09-30 RX ORDER — OMEPRAZOLE 40 MG/1
40 CAPSULE, DELAYED RELEASE ORAL 2 TIMES DAILY
COMMUNITY

## 2022-09-30 ASSESSMENT — ACTIVITIES OF DAILY LIVING (ADL)
ADL_SCORE: 12
ADL_SHORT_OF_BREATH: NO
ADL_BEFORE_ADMISSION: INDEPENDENT
NEEDS_ASSIST: NO
RECENT_DECLINE_ADL: NO

## 2022-09-30 ASSESSMENT — COGNITIVE AND FUNCTIONAL STATUS - GENERAL
ARE YOU BLIND OR DO YOU HAVE SERIOUS DIFFICULTY SEEING, EVEN WHEN WEARING GLASSES: NO
ARE YOU DEAF OR DO YOU HAVE SERIOUS DIFFICULTY  HEARING: NO

## 2022-10-01 ENCOUNTER — HOSPITAL ENCOUNTER (OUTPATIENT)
Dept: LAB | Age: 42
Discharge: HOME | End: 2022-10-01
Attending: OBSTETRICS & GYNECOLOGY

## 2022-10-01 DIAGNOSIS — Z01.812 PRE-PROCEDURAL LABORATORY EXAMINATIONS: ICD-10-CM

## 2022-10-01 LAB
SARS-COV-2 RNA RESP QL NAA+PROBE: NOT DETECTED
SERVICE CMNT-IMP: NORMAL
SERVICE CMNT-IMP: NORMAL

## 2022-10-01 PROCEDURE — U0003 INFECTIOUS AGENT DETECTION BY NUCLEIC ACID (DNA OR RNA); SEVERE ACUTE RESPIRATORY SYNDROME CORONAVIRUS 2 (SARS-COV-2) (CORONAVIRUS DISEASE [COVID-19]), AMPLIFIED PROBE TECHNIQUE, MAKING USE OF HIGH THROUGHPUT TECHNOLOGIES AS DESCRIBED BY CMS-2020-01-R: HCPCS | Performed by: OBSTETRICS & GYNECOLOGY

## 2022-10-03 ENCOUNTER — ANESTHESIA EVENT (OUTPATIENT)
Dept: SURGERY | Age: 42
End: 2022-10-03

## 2022-10-04 ENCOUNTER — HOSPITAL ENCOUNTER (OUTPATIENT)
Age: 42
Discharge: HOME | End: 2022-10-05
Attending: OBSTETRICS & GYNECOLOGY | Admitting: INTERNAL MEDICINE

## 2022-10-04 ENCOUNTER — ANESTHESIA (OUTPATIENT)
Dept: SURGERY | Age: 42
End: 2022-10-04

## 2022-10-04 DIAGNOSIS — Z01.812 PRE-PROCEDURAL LABORATORY EXAMINATIONS: Primary | ICD-10-CM

## 2022-10-04 DIAGNOSIS — R10.2 PELVIC PAIN IN FEMALE: ICD-10-CM

## 2022-10-04 DIAGNOSIS — D21.9 FIBROIDS: ICD-10-CM

## 2022-10-04 PROBLEM — D25.9 FIBROID UTERUS: Status: ACTIVE | Noted: 2022-10-04

## 2022-10-04 LAB
ABO + RH BLD: NORMAL
B-HCG UR QL: NEGATIVE
BLD GP AB SCN SERPL QL GEL: NEGATIVE
INTERNAL PROCEDURAL CONTROLS ACCEPTABLE: YES
TEST LOT EXPIRATION DATE: NORMAL
TEST LOT NUMBER: NORMAL
TYPE AND SCREEN EXPIRATION DATE: NORMAL

## 2022-10-04 PROCEDURE — 13000003 HB ANESTHESIA  GENERAL EA ADD MINUTE: Performed by: OBSTETRICS & GYNECOLOGY

## 2022-10-04 PROCEDURE — 10002800 HB RX 250 W HCPCS: Performed by: ANESTHESIOLOGY

## 2022-10-04 PROCEDURE — 10002803 HB RX 637: Performed by: INTERNAL MEDICINE

## 2022-10-04 PROCEDURE — 10002807 HB RX 258: Performed by: ANESTHESIOLOGY

## 2022-10-04 PROCEDURE — 10006023 HB SUPPLY 272: Performed by: OBSTETRICS & GYNECOLOGY

## 2022-10-04 PROCEDURE — 13000002 HB ANESTHESIA  GENERAL  S/U + 1ST 15 MIN: Performed by: OBSTETRICS & GYNECOLOGY

## 2022-10-04 PROCEDURE — 10004451 HB PACU RECOVERY 1ST 30 MINUTES: Performed by: OBSTETRICS & GYNECOLOGY

## 2022-10-04 PROCEDURE — 10004651 HB RX, NO CHARGE ITEM: Performed by: OBSTETRICS & GYNECOLOGY

## 2022-10-04 PROCEDURE — 99220 INITIAL OBSERVATION CARE,LEVL III: CPT | Performed by: INTERNAL MEDICINE

## 2022-10-04 PROCEDURE — 10002807 HB RX 258: Performed by: OBSTETRICS & GYNECOLOGY

## 2022-10-04 PROCEDURE — 10002803 HB RX 637: Performed by: OBSTETRICS & GYNECOLOGY

## 2022-10-04 PROCEDURE — 10002800 HB RX 250 W HCPCS: Performed by: OBSTETRICS & GYNECOLOGY

## 2022-10-04 PROCEDURE — 13001086 HB INCENTIVE SPIROMETER W INSTRUCT

## 2022-10-04 PROCEDURE — 10002803 HB RX 637: Performed by: ANESTHESIOLOGY

## 2022-10-04 PROCEDURE — 13000098 HB GENERAL ROBOTIC CASE S/U + 1ST 15 MIN: Performed by: OBSTETRICS & GYNECOLOGY

## 2022-10-04 PROCEDURE — 96361 HYDRATE IV INFUSION ADD-ON: CPT

## 2022-10-04 PROCEDURE — 88307 TISSUE EXAM BY PATHOLOGIST: CPT | Performed by: OBSTETRICS & GYNECOLOGY

## 2022-10-04 PROCEDURE — 96374 THER/PROPH/DIAG INJ IV PUSH: CPT

## 2022-10-04 PROCEDURE — 13000099 HB GENERAL ROBOTIC CASE EA ADD MINUTE: Performed by: OBSTETRICS & GYNECOLOGY

## 2022-10-04 PROCEDURE — 81025 URINE PREGNANCY TEST: CPT | Performed by: ANESTHESIOLOGY

## 2022-10-04 PROCEDURE — 10002801 HB RX 250 W/O HCPCS: Performed by: OBSTETRICS & GYNECOLOGY

## 2022-10-04 PROCEDURE — 10002801 HB RX 250 W/O HCPCS: Performed by: ANESTHESIOLOGY

## 2022-10-04 PROCEDURE — 10004452 HB PACU ADDL 30 MINUTES: Performed by: OBSTETRICS & GYNECOLOGY

## 2022-10-04 PROCEDURE — 86850 RBC ANTIBODY SCREEN: CPT | Performed by: OBSTETRICS & GYNECOLOGY

## 2022-10-04 PROCEDURE — 10002016 HB COUNTER INCENTIVE SPIROMETRY

## 2022-10-04 RX ORDER — HYDROCODONE BITARTRATE AND ACETAMINOPHEN 5; 325 MG/1; MG/1
1 TABLET ORAL EVERY 6 HOURS PRN
Status: DISCONTINUED | OUTPATIENT
Start: 2022-10-04 | End: 2022-10-05 | Stop reason: HOSPADM

## 2022-10-04 RX ORDER — MIDAZOLAM HYDROCHLORIDE 1 MG/ML
INJECTION, SOLUTION INTRAMUSCULAR; INTRAVENOUS PRN
Status: DISCONTINUED | OUTPATIENT
Start: 2022-10-04 | End: 2022-10-05

## 2022-10-04 RX ORDER — SIMETHICONE 80 MG
80 TABLET,CHEWABLE ORAL EVERY 6 HOURS PRN
Status: SHIPPED | COMMUNITY
Start: 2022-10-04

## 2022-10-04 RX ORDER — DEXAMETHASONE SODIUM PHOSPHATE 4 MG/ML
INJECTION, SOLUTION INTRA-ARTICULAR; INTRALESIONAL; INTRAMUSCULAR; INTRAVENOUS; SOFT TISSUE PRN
Status: DISCONTINUED | OUTPATIENT
Start: 2022-10-04 | End: 2022-10-05

## 2022-10-04 RX ORDER — IBUPROFEN 400 MG/1
600 TABLET ORAL EVERY 6 HOURS PRN
Status: DISCONTINUED | OUTPATIENT
Start: 2022-10-04 | End: 2022-10-05 | Stop reason: HOSPADM

## 2022-10-04 RX ORDER — ACETAMINOPHEN 325 MG/1
650 TABLET ORAL EVERY 6 HOURS SCHEDULED
Status: DISCONTINUED | OUTPATIENT
Start: 2022-10-04 | End: 2022-10-05 | Stop reason: HOSPADM

## 2022-10-04 RX ORDER — AMOXICILLIN 250 MG
2 CAPSULE ORAL 2 TIMES DAILY PRN
Status: DISCONTINUED | OUTPATIENT
Start: 2022-10-04 | End: 2022-10-05 | Stop reason: HOSPADM

## 2022-10-04 RX ORDER — SODIUM CHLORIDE, SODIUM LACTATE, POTASSIUM CHLORIDE, CALCIUM CHLORIDE 600; 310; 30; 20 MG/100ML; MG/100ML; MG/100ML; MG/100ML
INJECTION, SOLUTION INTRAVENOUS CONTINUOUS
Status: DISCONTINUED | OUTPATIENT
Start: 2022-10-04 | End: 2022-10-04

## 2022-10-04 RX ORDER — SCOLOPAMINE TRANSDERMAL SYSTEM 1 MG/1
1 PATCH, EXTENDED RELEASE TRANSDERMAL
Status: COMPLETED | OUTPATIENT
Start: 2022-10-04 | End: 2022-10-05

## 2022-10-04 RX ORDER — FAMOTIDINE 20 MG/1
20 TABLET, FILM COATED ORAL EVERY 12 HOURS SCHEDULED
Status: DISCONTINUED | OUTPATIENT
Start: 2022-10-04 | End: 2022-10-05 | Stop reason: HOSPADM

## 2022-10-04 RX ORDER — CALCIUM CARBONATE 500 MG/1
500 TABLET, CHEWABLE ORAL EVERY 4 HOURS PRN
Status: DISCONTINUED | OUTPATIENT
Start: 2022-10-04 | End: 2022-10-05 | Stop reason: HOSPADM

## 2022-10-04 RX ORDER — HYDRALAZINE HYDROCHLORIDE 20 MG/ML
5 INJECTION INTRAMUSCULAR; INTRAVENOUS EVERY 10 MIN PRN
Status: CANCELLED | OUTPATIENT
Start: 2022-10-04

## 2022-10-04 RX ORDER — DOCUSATE SODIUM 100 MG/1
100 CAPSULE, LIQUID FILLED ORAL 2 TIMES DAILY
Qty: 30 CAPSULE | Refills: 0 | Status: SHIPPED | COMMUNITY
Start: 2022-10-04

## 2022-10-04 RX ORDER — HYDROCODONE BITARTRATE AND ACETAMINOPHEN 5; 325 MG/1; MG/1
1 TABLET ORAL EVERY 6 HOURS PRN
Status: DISCONTINUED | OUTPATIENT
Start: 2022-10-04 | End: 2022-10-04

## 2022-10-04 RX ORDER — ONDANSETRON 2 MG/ML
INJECTION INTRAMUSCULAR; INTRAVENOUS PRN
Status: DISCONTINUED | OUTPATIENT
Start: 2022-10-04 | End: 2022-10-05

## 2022-10-04 RX ORDER — MIDAZOLAM HYDROCHLORIDE 1 MG/ML
2 INJECTION, SOLUTION INTRAMUSCULAR; INTRAVENOUS
Status: DISCONTINUED | OUTPATIENT
Start: 2022-10-04 | End: 2022-10-04 | Stop reason: HOSPADM

## 2022-10-04 RX ORDER — DOCUSATE SODIUM 100 MG/1
100 CAPSULE, LIQUID FILLED ORAL 2 TIMES DAILY
Status: DISCONTINUED | OUTPATIENT
Start: 2022-10-04 | End: 2022-10-05 | Stop reason: HOSPADM

## 2022-10-04 RX ORDER — SODIUM CHLORIDE, SODIUM LACTATE, POTASSIUM CHLORIDE, CALCIUM CHLORIDE 600; 310; 30; 20 MG/100ML; MG/100ML; MG/100ML; MG/100ML
INJECTION, SOLUTION INTRAVENOUS CONTINUOUS
Status: DISCONTINUED | OUTPATIENT
Start: 2022-10-04 | End: 2022-10-04 | Stop reason: HOSPADM

## 2022-10-04 RX ORDER — ONDANSETRON 4 MG/1
4 TABLET, ORALLY DISINTEGRATING ORAL EVERY 12 HOURS PRN
Status: DISCONTINUED | OUTPATIENT
Start: 2022-10-04 | End: 2022-10-05 | Stop reason: HOSPADM

## 2022-10-04 RX ORDER — FAMOTIDINE 20 MG/1
20 TABLET, FILM COATED ORAL
Status: COMPLETED | OUTPATIENT
Start: 2022-10-04 | End: 2022-10-04

## 2022-10-04 RX ORDER — HYDROCODONE BITARTRATE AND ACETAMINOPHEN 5; 325 MG/1; MG/1
1 TABLET ORAL EVERY 6 HOURS PRN
Qty: 20 TABLET | Refills: 0 | Status: SHIPPED | OUTPATIENT
Start: 2022-10-04

## 2022-10-04 RX ORDER — GABAPENTIN 300 MG/1
300 CAPSULE ORAL ONCE
Status: COMPLETED | OUTPATIENT
Start: 2022-10-04 | End: 2022-10-04

## 2022-10-04 RX ORDER — POLYETHYLENE GLYCOL 3350 17 G/17G
17 POWDER, FOR SOLUTION ORAL DAILY PRN
Qty: 30 PACKET | Status: SHIPPED | COMMUNITY
Start: 2022-10-04

## 2022-10-04 RX ORDER — PANTOPRAZOLE SODIUM 40 MG/1
40 TABLET, DELAYED RELEASE ORAL
Status: DISCONTINUED | OUTPATIENT
Start: 2022-10-04 | End: 2022-10-05 | Stop reason: HOSPADM

## 2022-10-04 RX ORDER — PROCHLORPERAZINE MALEATE 5 MG/1
5 TABLET ORAL EVERY 4 HOURS PRN
Status: DISCONTINUED | OUTPATIENT
Start: 2022-10-04 | End: 2022-10-05 | Stop reason: HOSPADM

## 2022-10-04 RX ORDER — TRAMADOL HYDROCHLORIDE 50 MG/1
50 TABLET ORAL EVERY 6 HOURS PRN
Status: DISCONTINUED | OUTPATIENT
Start: 2022-10-04 | End: 2022-10-05 | Stop reason: HOSPADM

## 2022-10-04 RX ORDER — ALBUTEROL SULFATE 90 UG/1
2 AEROSOL, METERED RESPIRATORY (INHALATION) EVERY 4 HOURS PRN
Status: DISCONTINUED | OUTPATIENT
Start: 2022-10-04 | End: 2022-10-05 | Stop reason: HOSPADM

## 2022-10-04 RX ORDER — HUMAN INSULIN 100 [IU]/ML
INJECTION, SOLUTION SUBCUTANEOUS
Status: DISCONTINUED | OUTPATIENT
Start: 2022-10-04 | End: 2022-10-04 | Stop reason: HOSPADM

## 2022-10-04 RX ORDER — BUPIVACAINE HYDROCHLORIDE 2.5 MG/ML
INJECTION, SOLUTION EPIDURAL; INFILTRATION; INTRACAUDAL PRN
Status: DISCONTINUED | OUTPATIENT
Start: 2022-10-04 | End: 2022-10-04 | Stop reason: HOSPADM

## 2022-10-04 RX ORDER — METOPROLOL SUCCINATE 25 MG/1
25 TABLET, EXTENDED RELEASE ORAL
Status: DISCONTINUED | OUTPATIENT
Start: 2022-10-04 | End: 2022-10-04 | Stop reason: HOSPADM

## 2022-10-04 RX ORDER — NALBUPHINE HYDROCHLORIDE 10 MG/ML
2.5 INJECTION, SOLUTION INTRAMUSCULAR; INTRAVENOUS; SUBCUTANEOUS
Status: CANCELLED | OUTPATIENT
Start: 2022-10-04

## 2022-10-04 RX ORDER — SUCRALFATE 1 G/1
1 TABLET ORAL
Status: DISCONTINUED | OUTPATIENT
Start: 2022-10-04 | End: 2022-10-05 | Stop reason: HOSPADM

## 2022-10-04 RX ORDER — SODIUM CHLORIDE, SODIUM LACTATE, POTASSIUM CHLORIDE, CALCIUM CHLORIDE 600; 310; 30; 20 MG/100ML; MG/100ML; MG/100ML; MG/100ML
INJECTION, SOLUTION INTRAVENOUS CONTINUOUS
Status: DISCONTINUED | OUTPATIENT
Start: 2022-10-04 | End: 2022-10-05

## 2022-10-04 RX ORDER — ROCURONIUM BROMIDE 10 MG/ML
INJECTION, SOLUTION INTRAVENOUS PRN
Status: DISCONTINUED | OUTPATIENT
Start: 2022-10-04 | End: 2022-10-05

## 2022-10-04 RX ORDER — ACETAMINOPHEN 325 MG/1
650 TABLET ORAL ONCE
Status: COMPLETED | OUTPATIENT
Start: 2022-10-04 | End: 2022-10-04

## 2022-10-04 RX ORDER — EPHEDRINE SULFATE/0.9% NACL/PF 50 MG/10ML
SYRINGE (ML) INTRAVENOUS PRN
Status: DISCONTINUED | OUTPATIENT
Start: 2022-10-04 | End: 2022-10-05

## 2022-10-04 RX ORDER — PROCHLORPERAZINE EDISYLATE 5 MG/ML
5 INJECTION INTRAMUSCULAR; INTRAVENOUS EVERY 4 HOURS PRN
Status: DISCONTINUED | OUTPATIENT
Start: 2022-10-04 | End: 2022-10-05 | Stop reason: HOSPADM

## 2022-10-04 RX ORDER — HYDROCODONE BITARTRATE AND ACETAMINOPHEN 10; 325 MG/1; MG/1
1 TABLET ORAL EVERY 6 HOURS PRN
Status: DISCONTINUED | OUTPATIENT
Start: 2022-10-04 | End: 2022-10-04

## 2022-10-04 RX ORDER — NALOXONE HCL 0.4 MG/ML
0.2 VIAL (ML) INJECTION EVERY 5 MIN PRN
Status: CANCELLED | OUTPATIENT
Start: 2022-10-04

## 2022-10-04 RX ORDER — PROPOFOL 10 MG/ML
INJECTION, EMULSION INTRAVENOUS PRN
Status: DISCONTINUED | OUTPATIENT
Start: 2022-10-04 | End: 2022-10-05

## 2022-10-04 RX ORDER — 0.9 % SODIUM CHLORIDE 0.9 %
2 VIAL (ML) INJECTION EVERY 12 HOURS SCHEDULED
Status: DISCONTINUED | OUTPATIENT
Start: 2022-10-04 | End: 2022-10-04 | Stop reason: HOSPADM

## 2022-10-04 RX ORDER — ALBUTEROL SULFATE 2.5 MG/3ML
2.5 SOLUTION RESPIRATORY (INHALATION)
Status: DISCONTINUED | OUTPATIENT
Start: 2022-10-04 | End: 2022-10-05 | Stop reason: HOSPADM

## 2022-10-04 RX ORDER — LIDOCAINE HYDROCHLORIDE 10 MG/ML
5-10 INJECTION, SOLUTION INFILTRATION; PERINEURAL PRN
Status: DISCONTINUED | OUTPATIENT
Start: 2022-10-04 | End: 2022-10-04 | Stop reason: HOSPADM

## 2022-10-04 RX ORDER — POLYETHYLENE GLYCOL 3350 17 G/17G
17 POWDER, FOR SOLUTION ORAL DAILY
Status: DISCONTINUED | OUTPATIENT
Start: 2022-10-05 | End: 2022-10-05 | Stop reason: HOSPADM

## 2022-10-04 RX ORDER — ONDANSETRON 2 MG/ML
4 INJECTION INTRAMUSCULAR; INTRAVENOUS EVERY 6 HOURS PRN
Status: DISCONTINUED | OUTPATIENT
Start: 2022-10-04 | End: 2022-10-05 | Stop reason: HOSPADM

## 2022-10-04 RX ORDER — IBUPROFEN 600 MG/1
600 TABLET ORAL EVERY 6 HOURS PRN
Qty: 30 TABLET | Refills: 0 | Status: SHIPPED | OUTPATIENT
Start: 2022-10-04

## 2022-10-04 RX ORDER — SIMETHICONE 125 MG
125 TABLET,CHEWABLE ORAL 4 TIMES DAILY PRN
Status: DISCONTINUED | OUTPATIENT
Start: 2022-10-04 | End: 2022-10-05 | Stop reason: HOSPADM

## 2022-10-04 RX ORDER — POLYETHYLENE GLYCOL 3350 17 G/17G
17 POWDER, FOR SOLUTION ORAL DAILY
Status: DISCONTINUED | OUTPATIENT
Start: 2022-10-04 | End: 2022-10-04

## 2022-10-04 RX ORDER — LORAZEPAM 0.5 MG/1
0.5 TABLET ORAL 2 TIMES DAILY PRN
Status: DISCONTINUED | OUTPATIENT
Start: 2022-10-04 | End: 2022-10-05 | Stop reason: HOSPADM

## 2022-10-04 RX ORDER — GABAPENTIN 100 MG/1
200 CAPSULE ORAL EVERY 8 HOURS SCHEDULED
Status: DISCONTINUED | OUTPATIENT
Start: 2022-10-04 | End: 2022-10-05 | Stop reason: HOSPADM

## 2022-10-04 RX ADMIN — EPHEDRINE SULFATE 25 MG: 5 INJECTION INTRAVENOUS at 12:09

## 2022-10-04 RX ADMIN — GABAPENTIN 200 MG: 100 CAPSULE ORAL at 21:02

## 2022-10-04 RX ADMIN — FLUORESCEIN SODIUM 0.25 ML: 100 INJECTION INTRAVENOUS at 13:23

## 2022-10-04 RX ADMIN — ONDANSETRON 4 MG: 2 INJECTION INTRAMUSCULAR; INTRAVENOUS at 13:02

## 2022-10-04 RX ADMIN — FAMOTIDINE 20 MG: 20 TABLET ORAL at 11:12

## 2022-10-04 RX ADMIN — DEXAMETHASONE SODIUM PHOSPHATE 8 MG: 4 INJECTION, SOLUTION INTRAMUSCULAR; INTRAVENOUS at 11:50

## 2022-10-04 RX ADMIN — ACETAMINOPHEN 650 MG: 325 TABLET ORAL at 11:12

## 2022-10-04 RX ADMIN — MIDAZOLAM HYDROCHLORIDE 2 MG: 1 INJECTION, SOLUTION INTRAMUSCULAR; INTRAVENOUS at 11:37

## 2022-10-04 RX ADMIN — SODIUM CHLORIDE, POTASSIUM CHLORIDE, SODIUM LACTATE AND CALCIUM CHLORIDE: 600; 310; 30; 20 INJECTION, SOLUTION INTRAVENOUS at 11:25

## 2022-10-04 RX ADMIN — KETOROLAC TROMETHAMINE 15 MG: 30 INJECTION, SOLUTION INTRAMUSCULAR at 13:32

## 2022-10-04 RX ADMIN — ACETAMINOPHEN 650 MG: 325 TABLET ORAL at 23:32

## 2022-10-04 RX ADMIN — FENTANYL CITRATE 100 MCG: 50 INJECTION, SOLUTION INTRAMUSCULAR; INTRAVENOUS at 11:37

## 2022-10-04 RX ADMIN — ROCURONIUM BROMIDE 50 MG: 10 INJECTION, SOLUTION INTRAVENOUS at 11:40

## 2022-10-04 RX ADMIN — SODIUM CHLORIDE, POTASSIUM CHLORIDE, SODIUM LACTATE AND CALCIUM CHLORIDE: 600; 310; 30; 20 INJECTION, SOLUTION INTRAVENOUS at 22:40

## 2022-10-04 RX ADMIN — GABAPENTIN 300 MG: 300 CAPSULE ORAL at 11:12

## 2022-10-04 RX ADMIN — SCOPALAMINE 1 PATCH: 1 PATCH, EXTENDED RELEASE TRANSDERMAL at 11:11

## 2022-10-04 RX ADMIN — DOCUSATE SODIUM 100 MG: 100 CAPSULE, LIQUID FILLED ORAL at 21:03

## 2022-10-04 RX ADMIN — ROCURONIUM BROMIDE 20 MG: 10 INJECTION, SOLUTION INTRAVENOUS at 13:12

## 2022-10-04 RX ADMIN — CEFAZOLIN SODIUM 2000 MG: 300 INJECTION, POWDER, LYOPHILIZED, FOR SOLUTION INTRAVENOUS at 11:51

## 2022-10-04 RX ADMIN — FAMOTIDINE 20 MG: 20 TABLET ORAL at 21:03

## 2022-10-04 RX ADMIN — PROPOFOL 200 MG: 10 INJECTION, EMULSION INTRAVENOUS at 11:40

## 2022-10-04 RX ADMIN — SODIUM CHLORIDE, POTASSIUM CHLORIDE, SODIUM LACTATE AND CALCIUM CHLORIDE: 600; 310; 30; 20 INJECTION, SOLUTION INTRAVENOUS at 12:07

## 2022-10-04 RX ADMIN — SUCRALFATE 1 G: 1 TABLET ORAL at 21:04

## 2022-10-04 RX ADMIN — METRONIDAZOLE 500 MG: 500 INJECTION, SOLUTION INTRAVENOUS at 13:13

## 2022-10-04 RX ADMIN — IBUPROFEN 600 MG: 400 TABLET, FILM COATED ORAL at 21:04

## 2022-10-04 RX ADMIN — PANTOPRAZOLE SODIUM 40 MG: 40 TABLET, DELAYED RELEASE ORAL at 21:03

## 2022-10-04 RX ADMIN — SODIUM CHLORIDE, POTASSIUM CHLORIDE, SODIUM LACTATE AND CALCIUM CHLORIDE: 600; 310; 30; 20 INJECTION, SOLUTION INTRAVENOUS at 14:06

## 2022-10-04 RX ADMIN — SUGAMMADEX 200 MG: 100 INJECTION, SOLUTION INTRAVENOUS at 13:46

## 2022-10-04 RX ADMIN — EPHEDRINE SULFATE 25 MG: 5 INJECTION INTRAVENOUS at 12:06

## 2022-10-04 RX ADMIN — CEFAZOLIN SODIUM 2000 MG: 300 INJECTION, POWDER, LYOPHILIZED, FOR SOLUTION INTRAVENOUS at 22:35

## 2022-10-04 RX ADMIN — TRAMADOL HYDROCHLORIDE 50 MG: 50 TABLET, COATED ORAL at 18:37

## 2022-10-04 ASSESSMENT — LIFESTYLE VARIABLES
IS PATIENT ABLE TO COMPLETE ASSESSMENT AT THIS TIME: YES
CONTINENCE: INDEPENDENT
SMOKING_YEARS: NO
HOW OFTEN DO YOU HAVE 6 OR MORE DRINKS ON ONE OCCASION: NEVER
ALCOHOL_USE_STATUS: NO OR LOW RISK WITH VALIDATED TOOL
HOW OFTEN DO YOU HAVE A DRINK CONTAINING ALCOHOL: MONTHLY OR LESS
HOW MANY STANDARD DRINKS CONTAINING ALCOHOL DO YOU HAVE ON A TYPICAL DAY: 0,1 OR 2
ADL SCORE: 24
TOILETING: INDEPENDENT
HAVE YOU BEEN EATING POORLY BECAUSE OF A DECREASED APPETITE: 0
RECENT DECLINE IN ADLS: NO
HISTORY OF PROBLEMS WHEN YOU STOP DRINKING ALCOHOL: NO
ENJOYING LIFE WITHOUT USING ALCOHOL OR DRUGS: INDEPENDENT
ADL NEEDS ASSIST: NO
CHRONIC/CANCER PAIN PRESENT: NO
ARE YOU DEAF OR DO YOU HAVE SERIOUS DIFFICULTY  HEARING: NO
ARE YOU BLIND OR DO YOU HAVE SERIOUS DIFFICULTY SEEING, EVEN WHEN WEARING GLASSES: NO
LAST DRINK YOU HAD: DENIES INTAKE
SHORT OF BREATH OR FATIGUE WITH ADLS: NO
AUDIT-C TOTAL SCORE: 1
RECENTLY LOST WEIGHT WITHOUT TRYING: 0
ADL BEFORE ADMISSION: INDEPENDENT
DRESSING: INDEPENDENT
TRANSFERRING: INDEPENDENT
FEEDING: INDEPENDENT

## 2022-10-04 ASSESSMENT — PAIN SCALES - GENERAL
PAINLEVEL_OUTOF10: 3
PAINLEVEL_OUTOF10: 4
PAINLEVEL_OUTOF10: 3
PAINLEVEL_OUTOF10: 0
PAINLEVEL_OUTOF10: 4
PAINLEVEL_OUTOF10: 3
PAINLEVEL_OUTOF10: 3
PAINLEVEL_OUTOF10: 4
PAINLEVEL_OUTOF10: 4

## 2022-10-04 ASSESSMENT — COGNITIVE AND FUNCTIONAL STATUS - GENERAL
BECAUSE OF A PHYSICAL, MENTAL, OR EMOTIONAL CONDITION, DO YOU HAVE DIFFICULTY DOING ERRANDS ALONE: NO
DO YOU HAVE DIFFICULTY DRESSING OR BATHING: NO
DO YOU HAVE SERIOUS DIFFICULTY WALKING OR CLIMBING STAIRS: NO
BECAUSE OF A PHYSICAL, MENTAL, OR EMOTIONAL CONDITION, DO YOU HAVE SERIOUS DIFFICULTY CONCENTRATING, REMEMBERING OR MAKING DECISIONS: NO

## 2022-10-05 LAB
ANION GAP SERPL CALC-SCNC: 11 MMOL/L (ref 7–19)
BASOPHILS # BLD: 0 K/MCL (ref 0–0.3)
BASOPHILS NFR BLD: 0 %
BUN SERPL-MCNC: 12 MG/DL (ref 6–20)
BUN/CREAT SERPL: 17 (ref 7–25)
CALCIUM SERPL-MCNC: 8.6 MG/DL (ref 8.4–10.2)
CHLORIDE SERPL-SCNC: 109 MMOL/L (ref 97–110)
CO2 SERPL-SCNC: 23 MMOL/L (ref 21–32)
CREAT SERPL-MCNC: 0.72 MG/DL (ref 0.51–0.95)
DEPRECATED RDW RBC: 49 FL (ref 39–50)
EOSINOPHIL # BLD: 0 K/MCL (ref 0–0.5)
EOSINOPHIL NFR BLD: 0 %
ERYTHROCYTE [DISTWIDTH] IN BLOOD: 14.6 % (ref 11–15)
FASTING DURATION TIME PATIENT: ABNORMAL H
GFR SERPLBLD BASED ON 1.73 SQ M-ARVRAT: >90 ML/MIN
GLUCOSE SERPL-MCNC: 107 MG/DL (ref 70–99)
HCT VFR BLD CALC: 42.2 % (ref 36–46.5)
HGB BLD-MCNC: 13.6 G/DL (ref 12–15.5)
IMM GRANULOCYTES # BLD AUTO: 0 K/MCL (ref 0–0.2)
IMM GRANULOCYTES # BLD: 0 %
LYMPHOCYTES # BLD: 2.1 K/MCL (ref 1–4.8)
LYMPHOCYTES NFR BLD: 19 %
MCH RBC QN AUTO: 29.5 PG (ref 26–34)
MCHC RBC AUTO-ENTMCNC: 32.2 G/DL (ref 32–36.5)
MCV RBC AUTO: 91.5 FL (ref 78–100)
MONOCYTES # BLD: 0.9 K/MCL (ref 0.3–0.9)
MONOCYTES NFR BLD: 8 %
NEUTROPHILS # BLD: 8.4 K/MCL (ref 1.8–7.7)
NEUTROPHILS NFR BLD: 73 %
NRBC BLD MANUAL-RTO: 0 /100 WBC
PLATELET # BLD AUTO: 277 K/MCL (ref 140–450)
POTASSIUM SERPL-SCNC: 3.8 MMOL/L (ref 3.4–5.1)
RBC # BLD: 4.61 MIL/MCL (ref 4–5.2)
SODIUM SERPL-SCNC: 139 MMOL/L (ref 135–145)
WBC # BLD: 11.5 K/MCL (ref 4.2–11)

## 2022-10-05 PROCEDURE — 10004651 HB RX, NO CHARGE ITEM: Performed by: OBSTETRICS & GYNECOLOGY

## 2022-10-05 PROCEDURE — 10002803 HB RX 637: Performed by: OBSTETRICS & GYNECOLOGY

## 2022-10-05 PROCEDURE — 99217 OBSERVATION CARE DISCHARGE: CPT | Performed by: INTERNAL MEDICINE

## 2022-10-05 PROCEDURE — 80048 BASIC METABOLIC PNL TOTAL CA: CPT | Performed by: OBSTETRICS & GYNECOLOGY

## 2022-10-05 PROCEDURE — 36415 COLL VENOUS BLD VENIPUNCTURE: CPT | Performed by: OBSTETRICS & GYNECOLOGY

## 2022-10-05 PROCEDURE — 96361 HYDRATE IV INFUSION ADD-ON: CPT

## 2022-10-05 PROCEDURE — 85025 COMPLETE CBC W/AUTO DIFF WBC: CPT | Performed by: OBSTETRICS & GYNECOLOGY

## 2022-10-05 PROCEDURE — 10002803 HB RX 637: Performed by: INTERNAL MEDICINE

## 2022-10-05 RX ORDER — SODIUM CHLORIDE, SODIUM LACTATE, POTASSIUM CHLORIDE, CALCIUM CHLORIDE 600; 310; 30; 20 MG/100ML; MG/100ML; MG/100ML; MG/100ML
INJECTION, SOLUTION INTRAVENOUS CONTINUOUS PRN
Status: DISCONTINUED | OUTPATIENT
Start: 2022-10-04 | End: 2022-10-05

## 2022-10-05 RX ADMIN — ACETAMINOPHEN 650 MG: 325 TABLET ORAL at 06:27

## 2022-10-05 RX ADMIN — PANTOPRAZOLE SODIUM 40 MG: 40 TABLET, DELAYED RELEASE ORAL at 06:27

## 2022-10-05 RX ADMIN — TRAMADOL HYDROCHLORIDE 50 MG: 50 TABLET, COATED ORAL at 08:13

## 2022-10-05 RX ADMIN — DOCUSATE SODIUM 100 MG: 100 CAPSULE, LIQUID FILLED ORAL at 08:13

## 2022-10-05 RX ADMIN — GABAPENTIN 200 MG: 100 CAPSULE ORAL at 06:27

## 2022-10-05 RX ADMIN — POLYETHYLENE GLYCOL (3350) 17 G: 17 POWDER, FOR SOLUTION ORAL at 08:13

## 2022-10-05 RX ADMIN — SUCRALFATE 1 G: 1 TABLET ORAL at 06:27

## 2022-10-05 RX ADMIN — FAMOTIDINE 20 MG: 20 TABLET ORAL at 08:16

## 2022-10-05 ASSESSMENT — PAIN SCALES - GENERAL: PAINLEVEL_OUTOF10: 5

## 2022-10-06 VITALS
HEART RATE: 71 BPM | OXYGEN SATURATION: 99 % | TEMPERATURE: 99.3 F | HEIGHT: 62 IN | SYSTOLIC BLOOD PRESSURE: 120 MMHG | DIASTOLIC BLOOD PRESSURE: 77 MMHG | RESPIRATION RATE: 14 BRPM | WEIGHT: 192.02 LBS | BODY MASS INDEX: 35.34 KG/M2

## 2022-10-17 PROCEDURE — 87186 SC STD MICRODIL/AGAR DIL: CPT | Performed by: CLINICAL MEDICAL LABORATORY

## 2022-10-17 PROCEDURE — 87086 URINE CULTURE/COLONY COUNT: CPT | Performed by: CLINICAL MEDICAL LABORATORY

## 2022-10-17 PROCEDURE — 87088 URINE BACTERIA CULTURE: CPT | Performed by: CLINICAL MEDICAL LABORATORY

## 2022-10-18 ENCOUNTER — LAB REQUISITION (OUTPATIENT)
Dept: LAB | Age: 42
End: 2022-10-18

## 2022-10-18 DIAGNOSIS — R35.0 FREQUENCY OF MICTURITION: ICD-10-CM

## 2022-10-19 LAB — BACTERIA UR CULT: ABNORMAL

## 2022-11-01 ENCOUNTER — PATIENT MESSAGE (OUTPATIENT)
Dept: RHEUMATOLOGY | Facility: CLINIC | Age: 42
End: 2022-11-01

## 2022-11-01 DIAGNOSIS — R79.82 ELEVATED C-REACTIVE PROTEIN (CRP): ICD-10-CM

## 2022-11-01 DIAGNOSIS — M35.9 UNDIFFERENTIATED CONNECTIVE TISSUE DISEASE (HCC): Primary | ICD-10-CM

## 2022-11-01 DIAGNOSIS — R53.82 CHRONIC FATIGUE: ICD-10-CM

## 2022-11-01 DIAGNOSIS — M25.50 POLYARTHRALGIA: ICD-10-CM

## 2022-11-01 DIAGNOSIS — R76.8 ANA POSITIVE: ICD-10-CM

## 2022-11-11 ENCOUNTER — LAB ENCOUNTER (OUTPATIENT)
Dept: LAB | Age: 42
End: 2022-11-11
Attending: INTERNAL MEDICINE
Payer: COMMERCIAL

## 2022-11-11 DIAGNOSIS — R76.8 ANA POSITIVE: ICD-10-CM

## 2022-11-11 DIAGNOSIS — R79.82 ELEVATED C-REACTIVE PROTEIN (CRP): ICD-10-CM

## 2022-11-11 DIAGNOSIS — M35.9 UNDIFFERENTIATED CONNECTIVE TISSUE DISEASE (HCC): ICD-10-CM

## 2022-11-11 DIAGNOSIS — M25.50 POLYARTHRALGIA: ICD-10-CM

## 2022-11-11 DIAGNOSIS — R53.82 CHRONIC FATIGUE: ICD-10-CM

## 2022-11-11 LAB
ALBUMIN SERPL-MCNC: 3.8 G/DL (ref 3.4–5)
ALBUMIN/GLOB SERPL: 1.1 {RATIO} (ref 1–2)
ALP LIVER SERPL-CCNC: 92 U/L
ALT SERPL-CCNC: 36 U/L
ANION GAP SERPL CALC-SCNC: 6 MMOL/L (ref 0–18)
AST SERPL-CCNC: 19 U/L (ref 15–37)
BASOPHILS # BLD AUTO: 0.05 X10(3) UL (ref 0–0.2)
BASOPHILS NFR BLD AUTO: 0.7 %
BILIRUB SERPL-MCNC: 0.2 MG/DL (ref 0.1–2)
BUN BLD-MCNC: 12 MG/DL (ref 7–18)
BUN/CREAT SERPL: 15.2 (ref 10–20)
CALCIUM BLD-MCNC: 9.1 MG/DL (ref 8.5–10.1)
CHLORIDE SERPL-SCNC: 106 MMOL/L (ref 98–112)
CO2 SERPL-SCNC: 27 MMOL/L (ref 21–32)
CREAT BLD-MCNC: 0.79 MG/DL
CRP SERPL-MCNC: 0.69 MG/DL (ref ?–0.3)
DEPRECATED RDW RBC AUTO: 45.3 FL (ref 35.1–46.3)
EOSINOPHIL # BLD AUTO: 0.14 X10(3) UL (ref 0–0.7)
EOSINOPHIL NFR BLD AUTO: 1.9 %
ERYTHROCYTE [DISTWIDTH] IN BLOOD BY AUTOMATED COUNT: 13.6 % (ref 11–15)
ERYTHROCYTE [SEDIMENTATION RATE] IN BLOOD: 31 MM/HR
FASTING STATUS PATIENT QL REPORTED: NO
GFR SERPLBLD BASED ON 1.73 SQ M-ARVRAT: 96 ML/MIN/1.73M2 (ref 60–?)
GLOBULIN PLAS-MCNC: 3.5 G/DL (ref 2.8–4.4)
GLUCOSE BLD-MCNC: 75 MG/DL (ref 70–99)
HCT VFR BLD AUTO: 42.6 %
HGB BLD-MCNC: 13.9 G/DL
IMM GRANULOCYTES # BLD AUTO: 0.01 X10(3) UL (ref 0–1)
IMM GRANULOCYTES NFR BLD: 0.1 %
LYMPHOCYTES # BLD AUTO: 2.52 X10(3) UL (ref 1–4)
LYMPHOCYTES NFR BLD AUTO: 34.1 %
MCH RBC QN AUTO: 29.5 PG (ref 26–34)
MCHC RBC AUTO-ENTMCNC: 32.6 G/DL (ref 31–37)
MCV RBC AUTO: 90.4 FL
MONOCYTES # BLD AUTO: 0.71 X10(3) UL (ref 0.1–1)
MONOCYTES NFR BLD AUTO: 9.6 %
NEUTROPHILS # BLD AUTO: 3.96 X10 (3) UL (ref 1.5–7.7)
NEUTROPHILS # BLD AUTO: 3.96 X10(3) UL (ref 1.5–7.7)
NEUTROPHILS NFR BLD AUTO: 53.6 %
OSMOLALITY SERPL CALC.SUM OF ELEC: 286 MOSM/KG (ref 275–295)
PLATELET # BLD AUTO: 300 10(3)UL (ref 150–450)
POTASSIUM SERPL-SCNC: 3.7 MMOL/L (ref 3.5–5.1)
PROT SERPL-MCNC: 7.3 G/DL (ref 6.4–8.2)
RBC # BLD AUTO: 4.71 X10(6)UL
SODIUM SERPL-SCNC: 139 MMOL/L (ref 136–145)
WBC # BLD AUTO: 7.4 X10(3) UL (ref 4–11)

## 2022-11-11 PROCEDURE — 80053 COMPREHEN METABOLIC PANEL: CPT

## 2022-11-11 PROCEDURE — 85025 COMPLETE CBC W/AUTO DIFF WBC: CPT

## 2022-11-11 PROCEDURE — 86140 C-REACTIVE PROTEIN: CPT

## 2022-11-11 PROCEDURE — 85652 RBC SED RATE AUTOMATED: CPT

## 2022-11-17 ENCOUNTER — OFFICE VISIT (OUTPATIENT)
Dept: RHEUMATOLOGY | Facility: CLINIC | Age: 42
End: 2022-11-17
Payer: COMMERCIAL

## 2022-11-17 ENCOUNTER — LAB ENCOUNTER (OUTPATIENT)
Dept: LAB | Age: 42
End: 2022-11-17
Attending: INTERNAL MEDICINE
Payer: COMMERCIAL

## 2022-11-17 VITALS
BODY MASS INDEX: 36.06 KG/M2 | DIASTOLIC BLOOD PRESSURE: 80 MMHG | HEIGHT: 61 IN | RESPIRATION RATE: 16 BRPM | WEIGHT: 191 LBS | TEMPERATURE: 98 F | SYSTOLIC BLOOD PRESSURE: 120 MMHG | HEART RATE: 92 BPM | OXYGEN SATURATION: 99 %

## 2022-11-17 DIAGNOSIS — R79.82 ELEVATED C-REACTIVE PROTEIN (CRP): ICD-10-CM

## 2022-11-17 DIAGNOSIS — E55.9 VITAMIN D DEFICIENCY: Primary | ICD-10-CM

## 2022-11-17 DIAGNOSIS — R79.82 CRP ELEVATED: ICD-10-CM

## 2022-11-17 DIAGNOSIS — Z71.6 ENCOUNTER FOR SMOKING CESSATION COUNSELING: ICD-10-CM

## 2022-11-17 DIAGNOSIS — M35.9 UNDIFFERENTIATED CONNECTIVE TISSUE DISEASE (HCC): Primary | ICD-10-CM

## 2022-11-17 DIAGNOSIS — M35.9 DIFFUSE DISEASE OF CONNECTIVE TISSUE (HCC): Primary | ICD-10-CM

## 2022-11-17 DIAGNOSIS — M35.9 UNDIFFERENTIATED CONNECTIVE TISSUE DISEASE (HCC): ICD-10-CM

## 2022-11-17 DIAGNOSIS — R70.0 ELEVATED SED RATE: ICD-10-CM

## 2022-11-17 DIAGNOSIS — M79.10 MYALGIA: ICD-10-CM

## 2022-11-17 DIAGNOSIS — E55.9 VITAMIN D DEFICIENCY: ICD-10-CM

## 2022-11-17 DIAGNOSIS — M25.50 POLYARTHRALGIA: ICD-10-CM

## 2022-11-17 DIAGNOSIS — R23.1 LIVEDO RETICULARIS: ICD-10-CM

## 2022-11-17 LAB
CK SERPL-CCNC: 101 U/L
VIT D+METAB SERPL-MCNC: 21.6 NG/ML (ref 30–100)

## 2022-11-17 PROCEDURE — 82550 ASSAY OF CK (CPK): CPT | Performed by: INTERNAL MEDICINE

## 2022-11-17 PROCEDURE — 3074F SYST BP LT 130 MM HG: CPT | Performed by: INTERNAL MEDICINE

## 2022-11-17 PROCEDURE — 3008F BODY MASS INDEX DOCD: CPT | Performed by: INTERNAL MEDICINE

## 2022-11-17 PROCEDURE — 86235 NUCLEAR ANTIGEN ANTIBODY: CPT | Performed by: INTERNAL MEDICINE

## 2022-11-17 PROCEDURE — 3079F DIAST BP 80-89 MM HG: CPT | Performed by: INTERNAL MEDICINE

## 2022-11-17 PROCEDURE — 99215 OFFICE O/P EST HI 40 MIN: CPT | Performed by: INTERNAL MEDICINE

## 2022-11-17 PROCEDURE — 83516 IMMUNOASSAY NONANTIBODY: CPT | Performed by: INTERNAL MEDICINE

## 2022-11-17 PROCEDURE — 82306 VITAMIN D 25 HYDROXY: CPT | Performed by: INTERNAL MEDICINE

## 2022-11-17 RX ORDER — IBUPROFEN 600 MG/1
600 TABLET ORAL EVERY 6 HOURS PRN
COMMUNITY
Start: 2022-10-04

## 2022-11-17 RX ORDER — PREDNISONE 1 MG/1
TABLET ORAL
Qty: 30 TABLET | Refills: 0 | Status: SHIPPED | OUTPATIENT
Start: 2022-11-17

## 2022-11-17 RX ORDER — ERGOCALCIFEROL 1.25 MG/1
50000 CAPSULE ORAL WEEKLY
Qty: 12 CAPSULE | Refills: 0 | Status: SHIPPED | OUTPATIENT
Start: 2022-11-17 | End: 2023-02-09

## 2022-11-18 RX ORDER — OMEPRAZOLE 40 MG/1
CAPSULE, DELAYED RELEASE ORAL
Qty: 180 CAPSULE | Refills: 0 | Status: SHIPPED | OUTPATIENT
Start: 2022-11-18

## 2022-11-26 LAB
EJ (GLYCYL - TRNA SYNTHETASE) ANTIBODY: NEGATIVE
FIBRILLARIN (U3 RNP) AB, IGG: NEGATIVE
JO-1 (HISTIDY1-TRNA SYNTHETASE) AB, IGG: 0 AU/ML
KU ANTIBODY: NEGATIVE
MDA5 (CADM-140) ANTIBODY: NEGATIVE
MI-2 (NUCLEAR HELICASE PROTEIN) ANTIBODY: NEGATIVE
NXP-2 (NUCLEAR MATRIX PROTEIN-2) AB: NEGATIVE
OJ (ISOLEUCYL-TRNA SYNTHETASE) ANTIBODY: NEGATIVE
P155/140 (TIF-1 GAMMA) ANTIBODY: NEGATIVE
PL-12 (ALANYL-TRNA SYNTHETASE) ANTIBODY: NEGATIVE
PL-7 (THREONYL-TRNA SYNTHETASE) ANTIBODY: NEGATIVE
PM_SCL 100 ANTIBODY, IGG: NEGATIVE
RIBONUCLEIC PROTEIN (U1) (ENA) AB, IGG: 42 UNITS
SAE1 (SUMO ACTIVATING ENZYME) ANTIBODY: NEGATIVE
SRP (SINGLE RECOGNITION PARTICLE) AB: NEGATIVE
SSA 52 (RO) (ENA) ANTIBODY, IGG: 13 AU/ML
SSA 60 (RO) (ENA) ANTIBODY, IGG: 11 AU/ML
TIF1-GAMMA ANTIBODY: NEGATIVE

## 2022-11-29 ENCOUNTER — PATIENT MESSAGE (OUTPATIENT)
Dept: RHEUMATOLOGY | Facility: CLINIC | Age: 42
End: 2022-11-29

## 2022-12-16 ENCOUNTER — TELEPHONE (OUTPATIENT)
Dept: GASTROENTEROLOGY | Facility: CLINIC | Age: 42
End: 2022-12-16

## 2022-12-16 ENCOUNTER — OFFICE VISIT (OUTPATIENT)
Facility: CLINIC | Age: 42
End: 2022-12-16

## 2022-12-16 VITALS
BODY MASS INDEX: 36 KG/M2 | WEIGHT: 192.63 LBS | HEART RATE: 94 BPM | SYSTOLIC BLOOD PRESSURE: 119 MMHG | DIASTOLIC BLOOD PRESSURE: 84 MMHG

## 2022-12-16 DIAGNOSIS — R10.30 LOWER ABDOMINAL PAIN: ICD-10-CM

## 2022-12-16 DIAGNOSIS — Z12.11 COLON CANCER SCREENING: Primary | ICD-10-CM

## 2022-12-16 DIAGNOSIS — K21.9 GASTROESOPHAGEAL REFLUX DISEASE, UNSPECIFIED WHETHER ESOPHAGITIS PRESENT: ICD-10-CM

## 2022-12-16 DIAGNOSIS — R07.9 CHEST PAIN, UNSPECIFIED TYPE: Primary | ICD-10-CM

## 2022-12-16 RX ORDER — ONDANSETRON 4 MG/1
4 TABLET, ORALLY DISINTEGRATING ORAL EVERY 8 HOURS PRN
Qty: 45 TABLET | Refills: 0 | Status: SHIPPED | OUTPATIENT
Start: 2022-12-16 | End: 2022-12-31

## 2022-12-16 NOTE — TELEPHONE ENCOUNTER
Scheduled for:  Colonoscopy 984[address]/EGD [address]  Provider Name:  Dr Quentin Grant  Date:  03/21/2023  Location:  [facility]  Sedation:  MAC  Time:  6512 (pt is aware to arrive at 0845)  Prep: Miralax  Meds/Allergies Reconciled?:  Physician reviewed  Diagnosis with codes:  GERD K21.9; CCS Z12.11  Was patient informed to call insurance with codes (Y/N):  y     Referral sent?:  na  EM or [facility] notified?:  I sent an electronic request to Endo Scheduling and received a confirmation today. Medication Orders:  Pt is aware to NOT take iron pills, herbal meds and diet supplements for 7 days before exam. Also to NOT take any form of alcohol, recreational drugs and any forms of ED meds 24 hours before exam.     Misc Orders:       Further instructions given by staff:  I discussed the prep intructions with the patient in office which she verbally understood. Copy of instructions was handed to patient as well. Patient was also advised he will receive a call from PAT nurse 72-24 hours prior procedure to schedule Covid test done.

## 2022-12-16 NOTE — PATIENT INSTRUCTIONS
1. Schedule colonoscopy/EGD [Diagnosis: MAC] -- NEED TO COMPLETE STRESS TEST FIRST    2.  bowel prep from pharmacy (miralax gatorade, single dose)    3. Continue all medications for procedure    4. Read all bowel prep instructions carefully    5. AVOID seeds, nuts, popcorn, raw fruits and vegetables (cooked is okay) for 2-3 days before procedure    6. You MAY need to go for COVID testing 72 hours before procedure. The testing team will call you a few days before your procedure to discuss with you if testing is required. If you are asked to go for COVID testing and do not completed the test, the procedure cannot be performed. 7. If you start any NEW medication after your visit today, please notify us. Certain medications will need to be held before the procedure, or the procedure cannot be performed. 8. Zofran as needed for nausea    9. Continue omeprazole     10. Continue anti-spasm     11.  Go for stress test

## 2023-01-04 ENCOUNTER — HOSPITAL ENCOUNTER (OUTPATIENT)
Dept: CV DIAGNOSTICS | Facility: HOSPITAL | Age: 43
Discharge: HOME | End: 2023-01-04
Attending: INTERNAL MEDICINE
Payer: COMMERCIAL

## 2023-01-04 DIAGNOSIS — R07.9 CHEST PAIN, UNSPECIFIED TYPE: ICD-10-CM

## 2023-01-04 LAB
% OF MAX PREDICTED HR: 100 %
MAX DIASTOLIC BP: 88 MMHG
MAX HEART RATE: 166 BPM
MAX PREDICTED HEART RATE: 179 BPM
MAX SYSTOLIC BP: 200 MMHG
MAX WORK LOAD: 101

## 2023-01-04 PROCEDURE — 93018 CV STRESS TEST I&R ONLY: CPT | Performed by: INTERNAL MEDICINE

## 2023-01-04 PROCEDURE — 93016 CV STRESS TEST SUPVJ ONLY: CPT | Performed by: INTERNAL MEDICINE

## 2023-01-04 PROCEDURE — 93017 CV STRESS TEST TRACING ONLY: CPT | Performed by: INTERNAL MEDICINE

## 2023-01-05 NOTE — PROGRESS NOTES
Dear Nusrat Stringer,    Your stress test came back as \"normal\". We can proceed with the endoscopy/colonoscopy as scheduled.     Ryan Grijalva

## 2023-01-24 ENCOUNTER — PATIENT MESSAGE (OUTPATIENT)
Dept: RHEUMATOLOGY | Facility: CLINIC | Age: 43
End: 2023-01-24

## 2023-01-24 DIAGNOSIS — M35.9 UNDIFFERENTIATED CONNECTIVE TISSUE DISEASE (HCC): Primary | ICD-10-CM

## 2023-01-24 DIAGNOSIS — M25.50 POLYARTHRALGIA: ICD-10-CM

## 2023-01-24 DIAGNOSIS — R76.8 ANA POSITIVE: ICD-10-CM

## 2023-01-24 DIAGNOSIS — R79.82 ELEVATED C-REACTIVE PROTEIN (CRP): ICD-10-CM

## 2023-01-24 DIAGNOSIS — R70.0 ELEVATED SED RATE: ICD-10-CM

## 2023-01-28 ENCOUNTER — LAB ENCOUNTER (OUTPATIENT)
Dept: LAB | Age: 43
End: 2023-01-28
Attending: INTERNAL MEDICINE
Payer: COMMERCIAL

## 2023-01-28 DIAGNOSIS — E55.9 VITAMIN D DEFICIENCY: ICD-10-CM

## 2023-01-28 DIAGNOSIS — R76.8 ANA POSITIVE: ICD-10-CM

## 2023-01-28 DIAGNOSIS — R70.0 ELEVATED SED RATE: ICD-10-CM

## 2023-01-28 DIAGNOSIS — M35.9 UNDIFFERENTIATED CONNECTIVE TISSUE DISEASE (HCC): ICD-10-CM

## 2023-01-28 DIAGNOSIS — M25.50 POLYARTHRALGIA: ICD-10-CM

## 2023-01-28 DIAGNOSIS — R79.82 ELEVATED C-REACTIVE PROTEIN (CRP): ICD-10-CM

## 2023-01-28 DIAGNOSIS — R19.7 DIARRHEA, UNSPECIFIED TYPE: ICD-10-CM

## 2023-01-28 LAB
ALBUMIN SERPL-MCNC: 3.7 G/DL (ref 3.4–5)
ALBUMIN/GLOB SERPL: 1.1 {RATIO} (ref 1–2)
ALP LIVER SERPL-CCNC: 92 U/L
ALT SERPL-CCNC: 34 U/L
ANION GAP SERPL CALC-SCNC: 3 MMOL/L (ref 0–18)
AST SERPL-CCNC: 19 U/L (ref 15–37)
BASOPHILS # BLD AUTO: 0.05 X10(3) UL (ref 0–0.2)
BASOPHILS NFR BLD AUTO: 0.8 %
BILIRUB SERPL-MCNC: 0.3 MG/DL (ref 0.1–2)
BUN BLD-MCNC: 13 MG/DL (ref 7–18)
BUN/CREAT SERPL: 17.3 (ref 10–20)
CALCIUM BLD-MCNC: 9 MG/DL (ref 8.5–10.1)
CHLORIDE SERPL-SCNC: 107 MMOL/L (ref 98–112)
CO2 SERPL-SCNC: 28 MMOL/L (ref 21–32)
CREAT BLD-MCNC: 0.75 MG/DL
CRP SERPL-MCNC: 0.34 MG/DL (ref ?–0.3)
DEPRECATED RDW RBC AUTO: 46.5 FL (ref 35.1–46.3)
EOSINOPHIL # BLD AUTO: 0.12 X10(3) UL (ref 0–0.7)
EOSINOPHIL NFR BLD AUTO: 1.9 %
ERYTHROCYTE [DISTWIDTH] IN BLOOD BY AUTOMATED COUNT: 13.6 % (ref 11–15)
ERYTHROCYTE [SEDIMENTATION RATE] IN BLOOD: 16 MM/HR
FASTING STATUS PATIENT QL REPORTED: NO
GFR SERPLBLD BASED ON 1.73 SQ M-ARVRAT: 103 ML/MIN/1.73M2 (ref 60–?)
GLOBULIN PLAS-MCNC: 3.4 G/DL (ref 2.8–4.4)
GLUCOSE BLD-MCNC: 88 MG/DL (ref 70–99)
HCT VFR BLD AUTO: 45 %
HGB BLD-MCNC: 14.6 G/DL
IGA SERPL-MCNC: 179 MG/DL (ref 70–312)
IMM GRANULOCYTES # BLD AUTO: 0.02 X10(3) UL (ref 0–1)
IMM GRANULOCYTES NFR BLD: 0.3 %
LYMPHOCYTES # BLD AUTO: 2.11 X10(3) UL (ref 1–4)
LYMPHOCYTES NFR BLD AUTO: 34.3 %
MCH RBC QN AUTO: 29.6 PG (ref 26–34)
MCHC RBC AUTO-ENTMCNC: 32.4 G/DL (ref 31–37)
MCV RBC AUTO: 91.3 FL
MONOCYTES # BLD AUTO: 0.51 X10(3) UL (ref 0.1–1)
MONOCYTES NFR BLD AUTO: 8.3 %
NEUTROPHILS # BLD AUTO: 3.35 X10 (3) UL (ref 1.5–7.7)
NEUTROPHILS # BLD AUTO: 3.35 X10(3) UL (ref 1.5–7.7)
NEUTROPHILS NFR BLD AUTO: 54.4 %
OSMOLALITY SERPL CALC.SUM OF ELEC: 286 MOSM/KG (ref 275–295)
PLATELET # BLD AUTO: 335 10(3)UL (ref 150–450)
POTASSIUM SERPL-SCNC: 3.8 MMOL/L (ref 3.5–5.1)
PROT SERPL-MCNC: 7.1 G/DL (ref 6.4–8.2)
RBC # BLD AUTO: 4.93 X10(6)UL
SODIUM SERPL-SCNC: 138 MMOL/L (ref 136–145)
VIT D+METAB SERPL-MCNC: 25.8 NG/ML (ref 30–100)
WBC # BLD AUTO: 6.2 X10(3) UL (ref 4–11)

## 2023-01-28 PROCEDURE — 86140 C-REACTIVE PROTEIN: CPT

## 2023-01-28 PROCEDURE — 80053 COMPREHEN METABOLIC PANEL: CPT

## 2023-01-28 PROCEDURE — 82306 VITAMIN D 25 HYDROXY: CPT

## 2023-01-28 PROCEDURE — 82784 ASSAY IGA/IGD/IGG/IGM EACH: CPT

## 2023-01-28 PROCEDURE — 85025 COMPLETE CBC W/AUTO DIFF WBC: CPT

## 2023-01-28 PROCEDURE — 86364 TISS TRNSGLTMNASE EA IG CLAS: CPT

## 2023-01-28 PROCEDURE — 36415 COLL VENOUS BLD VENIPUNCTURE: CPT

## 2023-01-28 PROCEDURE — 85652 RBC SED RATE AUTOMATED: CPT

## 2023-01-28 PROCEDURE — 86235 NUCLEAR ANTIGEN ANTIBODY: CPT

## 2023-01-30 ENCOUNTER — TELEPHONE (OUTPATIENT)
Facility: CLINIC | Age: 43
End: 2023-01-30

## 2023-01-30 LAB
ENA RNP IGG SER IA-ACNC: 0.8 U/ML
TTG IGA SER-ACNC: 0.3 U/ML (ref ?–7)
U1 SNRNP IGG SER IA-ACNC: 0.7 U/ML

## 2023-02-04 ENCOUNTER — LAB ENCOUNTER (OUTPATIENT)
Dept: LAB | Age: 43
End: 2023-02-04
Attending: INTERNAL MEDICINE
Payer: COMMERCIAL

## 2023-02-04 DIAGNOSIS — R19.7 DIARRHEA, UNSPECIFIED TYPE: ICD-10-CM

## 2023-02-04 PROCEDURE — 82705 FATS/LIPIDS FECES QUAL: CPT

## 2023-02-04 PROCEDURE — 87493 C DIFF AMPLIFIED PROBE: CPT

## 2023-02-04 PROCEDURE — 82656 EL-1 FECAL QUAL/SEMIQ: CPT

## 2023-02-05 LAB — C DIFF TOX B STL QL: NEGATIVE

## 2023-02-07 LAB
NEUTRAL FAT, FECAL: NORMAL
PANCREATIC ELASTASE , FECAL: >800 UG/G
SPLIT FAT, FECAL: NORMAL

## 2023-02-08 ENCOUNTER — OFFICE VISIT (OUTPATIENT)
Dept: RHEUMATOLOGY | Facility: CLINIC | Age: 43
End: 2023-02-08
Payer: COMMERCIAL

## 2023-02-08 ENCOUNTER — TELEPHONE (OUTPATIENT)
Facility: CLINIC | Age: 43
End: 2023-02-08

## 2023-02-08 VITALS
DIASTOLIC BLOOD PRESSURE: 78 MMHG | HEIGHT: 61 IN | OXYGEN SATURATION: 99 % | RESPIRATION RATE: 16 BRPM | HEART RATE: 86 BPM | TEMPERATURE: 97 F | BODY MASS INDEX: 36.44 KG/M2 | WEIGHT: 193 LBS | SYSTOLIC BLOOD PRESSURE: 108 MMHG

## 2023-02-08 DIAGNOSIS — Z12.11 COLON CANCER SCREENING: ICD-10-CM

## 2023-02-08 DIAGNOSIS — R79.82 ELEVATED C-REACTIVE PROTEIN (CRP): ICD-10-CM

## 2023-02-08 DIAGNOSIS — M25.50 POLYARTHRALGIA: ICD-10-CM

## 2023-02-08 DIAGNOSIS — R53.82 CHRONIC FATIGUE: ICD-10-CM

## 2023-02-08 DIAGNOSIS — R23.1 LIVEDO RETICULARIS: ICD-10-CM

## 2023-02-08 DIAGNOSIS — M35.9 UNDIFFERENTIATED CONNECTIVE TISSUE DISEASE (HCC): Primary | ICD-10-CM

## 2023-02-08 DIAGNOSIS — K21.9 GASTROESOPHAGEAL REFLUX DISEASE, UNSPECIFIED WHETHER ESOPHAGITIS PRESENT: Primary | ICD-10-CM

## 2023-02-08 PROCEDURE — 99214 OFFICE O/P EST MOD 30 MIN: CPT | Performed by: INTERNAL MEDICINE

## 2023-02-08 PROCEDURE — 3008F BODY MASS INDEX DOCD: CPT | Performed by: INTERNAL MEDICINE

## 2023-02-08 PROCEDURE — 3078F DIAST BP <80 MM HG: CPT | Performed by: INTERNAL MEDICINE

## 2023-02-08 PROCEDURE — 3074F SYST BP LT 130 MM HG: CPT | Performed by: INTERNAL MEDICINE

## 2023-02-08 NOTE — TELEPHONE ENCOUNTER
Canceled for:  Colonoscopy [facility]  Provider Name:  Dr Gisselle Gottlieb  Date:  03/21/2023  Location:  [facility]  Sedation:  MAC  Time:  8623 (pt is aware to arrive at [address] [place] [address])  Prep: Miralax  Meds/Allergies Reconciled?:  Physician reviewed  Diagnosis with codes:  GERD K21.9; CCS Z12.11      Canceled in Epic and Endo      T/E closed

## 2023-02-26 ENCOUNTER — LAB ENCOUNTER (OUTPATIENT)
Dept: LAB | Age: 43
End: 2023-02-26
Attending: INTERNAL MEDICINE
Payer: COMMERCIAL

## 2023-02-26 DIAGNOSIS — Z01.818 PRE-OP TESTING: ICD-10-CM

## 2023-02-27 LAB — SARS-COV-2 RNA RESP QL NAA+PROBE: NOT DETECTED

## 2023-03-01 PROBLEM — R19.4 CHANGE IN BOWEL HABITS: Status: ACTIVE | Noted: 2023-03-01

## 2023-03-01 PROBLEM — R13.10 DYSPHAGIA, UNSPECIFIED: Status: ACTIVE | Noted: 2023-03-01

## 2023-03-01 PROBLEM — R14.1 ABDOMINAL GAS PAIN: Status: ACTIVE | Noted: 2023-03-01

## 2023-03-01 PROBLEM — D12.8 BENIGN NEOPLASM OF RECTUM: Status: ACTIVE | Noted: 2023-03-01

## 2023-03-24 ENCOUNTER — PATIENT MESSAGE (OUTPATIENT)
Dept: RHEUMATOLOGY | Facility: CLINIC | Age: 43
End: 2023-03-24

## 2023-03-24 DIAGNOSIS — R79.82 ELEVATED C-REACTIVE PROTEIN (CRP): ICD-10-CM

## 2023-03-24 DIAGNOSIS — M35.9 UNDIFFERENTIATED CONNECTIVE TISSUE DISEASE (HCC): Primary | ICD-10-CM

## 2023-03-24 DIAGNOSIS — R10.84 GENERALIZED ABDOMINAL PAIN: ICD-10-CM

## 2023-03-24 DIAGNOSIS — M25.50 POLYARTHRALGIA: ICD-10-CM

## 2023-04-07 ENCOUNTER — LAB ENCOUNTER (OUTPATIENT)
Dept: LAB | Age: 43
End: 2023-04-07
Attending: INTERNAL MEDICINE
Payer: COMMERCIAL

## 2023-04-07 DIAGNOSIS — R10.84 GENERALIZED ABDOMINAL PAIN: ICD-10-CM

## 2023-04-07 DIAGNOSIS — R79.82 ELEVATED C-REACTIVE PROTEIN (CRP): ICD-10-CM

## 2023-04-07 DIAGNOSIS — M25.50 POLYARTHRALGIA: ICD-10-CM

## 2023-04-07 DIAGNOSIS — M35.9 UNDIFFERENTIATED CONNECTIVE TISSUE DISEASE (HCC): ICD-10-CM

## 2023-04-07 LAB
ALBUMIN SERPL-MCNC: 3.6 G/DL (ref 3.4–5)
ALBUMIN/GLOB SERPL: 0.9 {RATIO} (ref 1–2)
ALP LIVER SERPL-CCNC: 88 U/L
ALT SERPL-CCNC: 36 U/L
ANION GAP SERPL CALC-SCNC: 7 MMOL/L (ref 0–18)
AST SERPL-CCNC: 17 U/L (ref 15–37)
BASOPHILS # BLD AUTO: 0.07 X10(3) UL (ref 0–0.2)
BASOPHILS NFR BLD AUTO: 1 %
BILIRUB SERPL-MCNC: 0.4 MG/DL (ref 0.1–2)
BUN BLD-MCNC: 12 MG/DL (ref 7–18)
BUN/CREAT SERPL: 15.2 (ref 10–20)
CALCIUM BLD-MCNC: 9.3 MG/DL (ref 8.5–10.1)
CHLORIDE SERPL-SCNC: 105 MMOL/L (ref 98–112)
CO2 SERPL-SCNC: 25 MMOL/L (ref 21–32)
CORTIS SERPL-MCNC: 8.5 UG/DL
CREAT BLD-MCNC: 0.79 MG/DL
CRP SERPL-MCNC: 0.82 MG/DL (ref ?–0.3)
DEPRECATED RDW RBC AUTO: 44.9 FL (ref 35.1–46.3)
EOSINOPHIL # BLD AUTO: 0.14 X10(3) UL (ref 0–0.7)
EOSINOPHIL NFR BLD AUTO: 1.9 %
ERYTHROCYTE [DISTWIDTH] IN BLOOD BY AUTOMATED COUNT: 13.8 % (ref 11–15)
ERYTHROCYTE [SEDIMENTATION RATE] IN BLOOD: 44 MM/HR
FASTING STATUS PATIENT QL REPORTED: YES
GFR SERPLBLD BASED ON 1.73 SQ M-ARVRAT: 96 ML/MIN/1.73M2 (ref 60–?)
GLOBULIN PLAS-MCNC: 4 G/DL (ref 2.8–4.4)
GLUCOSE BLD-MCNC: 79 MG/DL (ref 70–99)
HCT VFR BLD AUTO: 45.5 %
HGB BLD-MCNC: 15.1 G/DL
IMM GRANULOCYTES # BLD AUTO: 0.02 X10(3) UL (ref 0–1)
IMM GRANULOCYTES NFR BLD: 0.3 %
LYMPHOCYTES # BLD AUTO: 1.97 X10(3) UL (ref 1–4)
LYMPHOCYTES NFR BLD AUTO: 27.2 %
MCH RBC QN AUTO: 29.4 PG (ref 26–34)
MCHC RBC AUTO-ENTMCNC: 33.2 G/DL (ref 31–37)
MCV RBC AUTO: 88.5 FL
MONOCYTES # BLD AUTO: 0.74 X10(3) UL (ref 0.1–1)
MONOCYTES NFR BLD AUTO: 10.2 %
NEUTROPHILS # BLD AUTO: 4.31 X10 (3) UL (ref 1.5–7.7)
NEUTROPHILS # BLD AUTO: 4.31 X10(3) UL (ref 1.5–7.7)
NEUTROPHILS NFR BLD AUTO: 59.4 %
OSMOLALITY SERPL CALC.SUM OF ELEC: 283 MOSM/KG (ref 275–295)
PLATELET # BLD AUTO: 327 10(3)UL (ref 150–450)
POTASSIUM SERPL-SCNC: 3.9 MMOL/L (ref 3.5–5.1)
PROT SERPL-MCNC: 7.6 G/DL (ref 6.4–8.2)
RBC # BLD AUTO: 5.14 X10(6)UL
SODIUM SERPL-SCNC: 137 MMOL/L (ref 136–145)
WBC # BLD AUTO: 7.3 X10(3) UL (ref 4–11)

## 2023-04-07 PROCEDURE — 86140 C-REACTIVE PROTEIN: CPT

## 2023-04-07 PROCEDURE — 82533 TOTAL CORTISOL: CPT

## 2023-04-07 PROCEDURE — 85025 COMPLETE CBC W/AUTO DIFF WBC: CPT

## 2023-04-07 PROCEDURE — 86671 FUNGUS NES ANTIBODY: CPT

## 2023-04-07 PROCEDURE — 85652 RBC SED RATE AUTOMATED: CPT

## 2023-04-07 PROCEDURE — 80053 COMPREHEN METABOLIC PANEL: CPT

## 2023-04-10 LAB
S CEREVISIAE IGA: <20 UNITS
S CEREVISIAE IGG: <20 UNITS

## 2023-04-11 DIAGNOSIS — M35.9 UNDIFFERENTIATED CONNECTIVE TISSUE DISEASE (HCC): Primary | ICD-10-CM

## 2023-04-11 RX ORDER — PREDNISONE 10 MG/1
TABLET ORAL
Qty: 30 TABLET | Refills: 0 | Status: SHIPPED | OUTPATIENT
Start: 2023-04-11

## 2023-04-12 PROBLEM — N92.0 MENORRHAGIA: Status: ACTIVE | Noted: 2023-04-12

## 2023-04-12 PROBLEM — F41.9 ANXIETY: Status: ACTIVE | Noted: 2017-11-15

## 2023-04-12 PROBLEM — R30.0 DYSURIA: Status: ACTIVE | Noted: 2018-08-28

## 2023-04-12 PROBLEM — D25.9 UTERINE LEIOMYOMA: Status: ACTIVE | Noted: 2022-10-04

## 2023-04-12 PROBLEM — N20.0 NEPHROLITHIASIS: Status: ACTIVE | Noted: 2017-12-20

## 2023-04-12 PROBLEM — M35.1 MIXED CONNECTIVE TISSUE DISEASE (HCC): Status: ACTIVE | Noted: 2022-11-21

## 2023-04-12 PROBLEM — R35.0 INCREASED FREQUENCY OF URINATION: Status: ACTIVE | Noted: 2023-04-12

## 2023-04-12 PROBLEM — E55.9 VITAMIN D DEFICIENCY: Status: ACTIVE | Noted: 2019-02-26

## 2023-04-12 PROBLEM — N85.2 ENLARGED UTERUS: Status: ACTIVE | Noted: 2023-04-12

## 2023-04-12 PROBLEM — H53.50 COLOR BLINDNESS: Status: ACTIVE | Noted: 2018-02-15

## 2023-04-12 PROBLEM — G43.109 MIGRAINE WITH AURA: Status: ACTIVE | Noted: 2023-04-12

## 2023-04-12 PROBLEM — N39.0 RECURRENT UTI: Status: ACTIVE | Noted: 2017-12-20

## 2023-04-12 PROBLEM — R10.2 PELVIC PAIN IN FEMALE: Status: ACTIVE | Noted: 2022-10-04

## 2023-04-28 ENCOUNTER — PATIENT MESSAGE (OUTPATIENT)
Dept: RHEUMATOLOGY | Facility: CLINIC | Age: 43
End: 2023-04-28

## 2023-05-18 ENCOUNTER — OFFICE VISIT (OUTPATIENT)
Dept: RHEUMATOLOGY | Facility: CLINIC | Age: 43
End: 2023-05-18
Payer: COMMERCIAL

## 2023-05-18 VITALS
DIASTOLIC BLOOD PRESSURE: 60 MMHG | SYSTOLIC BLOOD PRESSURE: 114 MMHG | BODY MASS INDEX: 37.38 KG/M2 | OXYGEN SATURATION: 100 % | HEIGHT: 61 IN | HEART RATE: 86 BPM | RESPIRATION RATE: 16 BRPM | WEIGHT: 198 LBS | TEMPERATURE: 98 F

## 2023-05-18 DIAGNOSIS — M35.9 UNDIFFERENTIATED CONNECTIVE TISSUE DISEASE (HCC): Primary | ICD-10-CM

## 2023-05-18 DIAGNOSIS — M70.50 PES ANSERINE BURSITIS: ICD-10-CM

## 2023-05-18 DIAGNOSIS — R00.2 PALPITATIONS: ICD-10-CM

## 2023-05-18 DIAGNOSIS — Z71.6 ENCOUNTER FOR SMOKING CESSATION COUNSELING: ICD-10-CM

## 2023-05-18 DIAGNOSIS — E55.9 VITAMIN D DEFICIENCY: ICD-10-CM

## 2023-05-18 DIAGNOSIS — M25.50 POLYARTHRALGIA: ICD-10-CM

## 2023-05-18 DIAGNOSIS — R79.82 ELEVATED C-REACTIVE PROTEIN (CRP): ICD-10-CM

## 2023-05-18 DIAGNOSIS — R23.1 LIVEDO RETICULARIS: ICD-10-CM

## 2023-05-18 DIAGNOSIS — R53.82 CHRONIC FATIGUE: ICD-10-CM

## 2023-05-18 DIAGNOSIS — H20.9 IRITIS: ICD-10-CM

## 2023-05-18 PROCEDURE — 3008F BODY MASS INDEX DOCD: CPT | Performed by: INTERNAL MEDICINE

## 2023-05-18 PROCEDURE — 3074F SYST BP LT 130 MM HG: CPT | Performed by: INTERNAL MEDICINE

## 2023-05-18 PROCEDURE — 3078F DIAST BP <80 MM HG: CPT | Performed by: INTERNAL MEDICINE

## 2023-05-18 PROCEDURE — 99215 OFFICE O/P EST HI 40 MIN: CPT | Performed by: INTERNAL MEDICINE

## 2023-05-18 RX ORDER — DESVENLAFAXINE SUCCINATE 50 MG/1
50 TABLET, EXTENDED RELEASE ORAL DAILY
COMMUNITY
Start: 2023-04-26

## 2023-05-18 RX ORDER — PREDNISONE 5 MG/1
TABLET ORAL
Qty: 30 TABLET | Refills: 0 | Status: SHIPPED | OUTPATIENT
Start: 2023-05-18

## 2023-05-18 RX ORDER — FOLIC ACID 1 MG/1
1 TABLET ORAL DAILY
Qty: 90 TABLET | Refills: 0 | Status: SHIPPED | OUTPATIENT
Start: 2023-05-18

## 2023-05-18 RX ORDER — OLOPATADINE HYDROCHLORIDE 1 MG/ML
SOLUTION/ DROPS OPHTHALMIC
COMMUNITY
Start: 2023-05-02

## 2023-05-18 RX ORDER — METHOTREXATE 2.5 MG/1
12.5 TABLET ORAL WEEKLY
Qty: 60 TABLET | Refills: 0 | Status: SHIPPED | OUTPATIENT
Start: 2023-05-18 | End: 2023-08-16

## 2023-06-28 ENCOUNTER — LAB ENCOUNTER (OUTPATIENT)
Dept: LAB | Facility: REFERENCE LAB | Age: 43
End: 2023-06-28
Attending: INTERNAL MEDICINE
Payer: COMMERCIAL

## 2023-06-28 DIAGNOSIS — M35.9 UNDIFFERENTIATED CONNECTIVE TISSUE DISEASE (HCC): ICD-10-CM

## 2023-06-28 DIAGNOSIS — M25.50 POLYARTHRALGIA: ICD-10-CM

## 2023-06-28 DIAGNOSIS — R79.82 ELEVATED C-REACTIVE PROTEIN (CRP): ICD-10-CM

## 2023-06-28 DIAGNOSIS — E55.9 VITAMIN D DEFICIENCY: ICD-10-CM

## 2023-06-28 LAB
ALBUMIN SERPL-MCNC: 3.6 G/DL (ref 3.4–5)
ALBUMIN/GLOB SERPL: 1 {RATIO} (ref 1–2)
ALP LIVER SERPL-CCNC: 69 U/L
ALT SERPL-CCNC: 37 U/L
ANION GAP SERPL CALC-SCNC: 9 MMOL/L (ref 0–18)
AST SERPL-CCNC: 21 U/L (ref 15–37)
BASOPHILS # BLD AUTO: 0.06 X10(3) UL (ref 0–0.2)
BASOPHILS NFR BLD AUTO: 0.9 %
BILIRUB SERPL-MCNC: 0.3 MG/DL (ref 0.1–2)
BUN BLD-MCNC: 12 MG/DL (ref 7–18)
BUN/CREAT SERPL: 17.6 (ref 10–20)
CALCIUM BLD-MCNC: 8.9 MG/DL (ref 8.5–10.1)
CHLORIDE SERPL-SCNC: 110 MMOL/L (ref 98–112)
CO2 SERPL-SCNC: 20 MMOL/L (ref 21–32)
CREAT BLD-MCNC: 0.68 MG/DL
CRP SERPL-MCNC: 0.32 MG/DL (ref ?–0.3)
DEPRECATED RDW RBC AUTO: 45.8 FL (ref 35.1–46.3)
EOSINOPHIL # BLD AUTO: 0.09 X10(3) UL (ref 0–0.7)
EOSINOPHIL NFR BLD AUTO: 1.3 %
ERYTHROCYTE [DISTWIDTH] IN BLOOD BY AUTOMATED COUNT: 14.1 % (ref 11–15)
ERYTHROCYTE [SEDIMENTATION RATE] IN BLOOD: 29 MM/HR
FASTING STATUS PATIENT QL REPORTED: NO
GFR SERPLBLD BASED ON 1.73 SQ M-ARVRAT: 111 ML/MIN/1.73M2 (ref 60–?)
GLOBULIN PLAS-MCNC: 3.6 G/DL (ref 2.8–4.4)
GLUCOSE BLD-MCNC: 85 MG/DL (ref 70–99)
HCT VFR BLD AUTO: 43.6 %
HGB BLD-MCNC: 14.7 G/DL
IMM GRANULOCYTES # BLD AUTO: 0.01 X10(3) UL (ref 0–1)
IMM GRANULOCYTES NFR BLD: 0.1 %
LYMPHOCYTES # BLD AUTO: 2.1 X10(3) UL (ref 1–4)
LYMPHOCYTES NFR BLD AUTO: 31.2 %
MCH RBC QN AUTO: 30.1 PG (ref 26–34)
MCHC RBC AUTO-ENTMCNC: 33.7 G/DL (ref 31–37)
MCV RBC AUTO: 89.3 FL
MONOCYTES # BLD AUTO: 0.61 X10(3) UL (ref 0.1–1)
MONOCYTES NFR BLD AUTO: 9.1 %
NEUTROPHILS # BLD AUTO: 3.86 X10 (3) UL (ref 1.5–7.7)
NEUTROPHILS # BLD AUTO: 3.86 X10(3) UL (ref 1.5–7.7)
NEUTROPHILS NFR BLD AUTO: 57.4 %
OSMOLALITY SERPL CALC.SUM OF ELEC: 287 MOSM/KG (ref 275–295)
PLATELET # BLD AUTO: 325 10(3)UL (ref 150–450)
POTASSIUM SERPL-SCNC: 4.1 MMOL/L (ref 3.5–5.1)
PROT SERPL-MCNC: 7.2 G/DL (ref 6.4–8.2)
RBC # BLD AUTO: 4.88 X10(6)UL
SODIUM SERPL-SCNC: 139 MMOL/L (ref 136–145)
VIT D+METAB SERPL-MCNC: 24.7 NG/ML (ref 30–100)
WBC # BLD AUTO: 6.7 X10(3) UL (ref 4–11)

## 2023-06-28 PROCEDURE — 82306 VITAMIN D 25 HYDROXY: CPT | Performed by: INTERNAL MEDICINE

## 2023-06-28 PROCEDURE — 80053 COMPREHEN METABOLIC PANEL: CPT | Performed by: INTERNAL MEDICINE

## 2023-06-28 PROCEDURE — 85652 RBC SED RATE AUTOMATED: CPT | Performed by: INTERNAL MEDICINE

## 2023-06-28 PROCEDURE — 86140 C-REACTIVE PROTEIN: CPT | Performed by: INTERNAL MEDICINE

## 2023-06-28 PROCEDURE — 85025 COMPLETE CBC W/AUTO DIFF WBC: CPT | Performed by: INTERNAL MEDICINE

## 2023-07-04 DIAGNOSIS — E55.9 VITAMIN D DEFICIENCY: Primary | ICD-10-CM

## 2023-07-04 RX ORDER — ERGOCALCIFEROL 1.25 MG/1
50000 CAPSULE ORAL WEEKLY
Qty: 12 CAPSULE | Refills: 0 | Status: SHIPPED | OUTPATIENT
Start: 2023-07-04 | End: 2023-09-26

## 2023-07-20 ENCOUNTER — LAB ENCOUNTER (OUTPATIENT)
Dept: LAB | Facility: REFERENCE LAB | Age: 43
End: 2023-07-20
Attending: INTERNAL MEDICINE
Payer: COMMERCIAL

## 2023-07-20 DIAGNOSIS — M35.9 UNDIFFERENTIATED CONNECTIVE TISSUE DISEASE (HCC): ICD-10-CM

## 2023-07-20 DIAGNOSIS — R79.82 ELEVATED C-REACTIVE PROTEIN (CRP): ICD-10-CM

## 2023-07-20 DIAGNOSIS — M25.50 POLYARTHRALGIA: ICD-10-CM

## 2023-07-20 LAB
ALBUMIN SERPL-MCNC: 3.7 G/DL (ref 3.4–5)
ALBUMIN/GLOB SERPL: 0.9 {RATIO} (ref 1–2)
ALP LIVER SERPL-CCNC: 76 U/L
ALT SERPL-CCNC: 38 U/L
ANION GAP SERPL CALC-SCNC: 10 MMOL/L (ref 0–18)
AST SERPL-CCNC: 19 U/L (ref 15–37)
BASOPHILS # BLD AUTO: 0.06 X10(3) UL (ref 0–0.2)
BASOPHILS NFR BLD AUTO: 1 %
BILIRUB SERPL-MCNC: 0.3 MG/DL (ref 0.1–2)
BUN BLD-MCNC: 12 MG/DL (ref 7–18)
BUN/CREAT SERPL: 13 (ref 10–20)
CALCIUM BLD-MCNC: 9.5 MG/DL (ref 8.5–10.1)
CHLORIDE SERPL-SCNC: 107 MMOL/L (ref 98–112)
CO2 SERPL-SCNC: 22 MMOL/L (ref 21–32)
CREAT BLD-MCNC: 0.92 MG/DL
CRP SERPL-MCNC: 0.32 MG/DL (ref ?–0.3)
DEPRECATED RDW RBC AUTO: 46.4 FL (ref 35.1–46.3)
EGFRCR SERPLBLD CKD-EPI 2021: 80 ML/MIN/1.73M2 (ref 60–?)
EOSINOPHIL # BLD AUTO: 0.12 X10(3) UL (ref 0–0.7)
EOSINOPHIL NFR BLD AUTO: 2 %
ERYTHROCYTE [DISTWIDTH] IN BLOOD BY AUTOMATED COUNT: 14.1 % (ref 11–15)
ERYTHROCYTE [SEDIMENTATION RATE] IN BLOOD: 33 MM/HR
FASTING STATUS PATIENT QL REPORTED: NO
GLOBULIN PLAS-MCNC: 3.9 G/DL (ref 2.8–4.4)
GLUCOSE BLD-MCNC: 112 MG/DL (ref 70–99)
HCT VFR BLD AUTO: 44 %
HGB BLD-MCNC: 14.8 G/DL
IMM GRANULOCYTES # BLD AUTO: 0.01 X10(3) UL (ref 0–1)
IMM GRANULOCYTES NFR BLD: 0.2 %
LYMPHOCYTES # BLD AUTO: 2.1 X10(3) UL (ref 1–4)
LYMPHOCYTES NFR BLD AUTO: 34.7 %
MCH RBC QN AUTO: 30.1 PG (ref 26–34)
MCHC RBC AUTO-ENTMCNC: 33.6 G/DL (ref 31–37)
MCV RBC AUTO: 89.6 FL
MONOCYTES # BLD AUTO: 0.65 X10(3) UL (ref 0.1–1)
MONOCYTES NFR BLD AUTO: 10.7 %
NEUTROPHILS # BLD AUTO: 3.11 X10 (3) UL (ref 1.5–7.7)
NEUTROPHILS # BLD AUTO: 3.11 X10(3) UL (ref 1.5–7.7)
NEUTROPHILS NFR BLD AUTO: 51.4 %
OSMOLALITY SERPL CALC.SUM OF ELEC: 289 MOSM/KG (ref 275–295)
PLATELET # BLD AUTO: 301 10(3)UL (ref 150–450)
POTASSIUM SERPL-SCNC: 4 MMOL/L (ref 3.5–5.1)
PROT SERPL-MCNC: 7.6 G/DL (ref 6.4–8.2)
RBC # BLD AUTO: 4.91 X10(6)UL
SODIUM SERPL-SCNC: 139 MMOL/L (ref 136–145)
WBC # BLD AUTO: 6.1 X10(3) UL (ref 4–11)

## 2023-07-20 PROCEDURE — 85652 RBC SED RATE AUTOMATED: CPT | Performed by: INTERNAL MEDICINE

## 2023-07-20 PROCEDURE — 80053 COMPREHEN METABOLIC PANEL: CPT | Performed by: INTERNAL MEDICINE

## 2023-07-20 PROCEDURE — 85025 COMPLETE CBC W/AUTO DIFF WBC: CPT | Performed by: INTERNAL MEDICINE

## 2023-07-20 PROCEDURE — 86140 C-REACTIVE PROTEIN: CPT | Performed by: INTERNAL MEDICINE

## 2023-07-26 ENCOUNTER — OFFICE VISIT (OUTPATIENT)
Dept: RHEUMATOLOGY | Facility: CLINIC | Age: 43
End: 2023-07-26
Payer: COMMERCIAL

## 2023-07-26 ENCOUNTER — HOSPITAL ENCOUNTER (OUTPATIENT)
Dept: GENERAL RADIOLOGY | Age: 43
Discharge: HOME | End: 2023-07-26
Attending: INTERNAL MEDICINE
Payer: COMMERCIAL

## 2023-07-26 VITALS
DIASTOLIC BLOOD PRESSURE: 78 MMHG | TEMPERATURE: 98 F | WEIGHT: 197 LBS | HEIGHT: 61 IN | OXYGEN SATURATION: 97 % | HEART RATE: 82 BPM | SYSTOLIC BLOOD PRESSURE: 116 MMHG | BODY MASS INDEX: 37.19 KG/M2 | RESPIRATION RATE: 16 BRPM

## 2023-07-26 DIAGNOSIS — R53.82 CHRONIC FATIGUE: ICD-10-CM

## 2023-07-26 DIAGNOSIS — R07.89 OTHER CHEST PAIN: ICD-10-CM

## 2023-07-26 DIAGNOSIS — M62.838 MUSCLE SPASM: ICD-10-CM

## 2023-07-26 DIAGNOSIS — M35.9 UNDIFFERENTIATED CONNECTIVE TISSUE DISEASE (HCC): Primary | ICD-10-CM

## 2023-07-26 DIAGNOSIS — Z71.6 ENCOUNTER FOR SMOKING CESSATION COUNSELING: ICD-10-CM

## 2023-07-26 DIAGNOSIS — M35.9 UNDIFFERENTIATED CONNECTIVE TISSUE DISEASE (HCC): ICD-10-CM

## 2023-07-26 DIAGNOSIS — Z79.899 HIGH RISK MEDICATION USE: ICD-10-CM

## 2023-07-26 DIAGNOSIS — H20.9 IRITIS: ICD-10-CM

## 2023-07-26 DIAGNOSIS — R79.82 ELEVATED C-REACTIVE PROTEIN (CRP): ICD-10-CM

## 2023-07-26 DIAGNOSIS — M25.50 POLYARTHRALGIA: ICD-10-CM

## 2023-07-26 PROCEDURE — 3074F SYST BP LT 130 MM HG: CPT | Performed by: INTERNAL MEDICINE

## 2023-07-26 PROCEDURE — 71046 X-RAY EXAM CHEST 2 VIEWS: CPT | Performed by: INTERNAL MEDICINE

## 2023-07-26 PROCEDURE — 99214 OFFICE O/P EST MOD 30 MIN: CPT | Performed by: INTERNAL MEDICINE

## 2023-07-26 PROCEDURE — 3078F DIAST BP <80 MM HG: CPT | Performed by: INTERNAL MEDICINE

## 2023-07-26 PROCEDURE — 3008F BODY MASS INDEX DOCD: CPT | Performed by: INTERNAL MEDICINE

## 2023-07-26 RX ORDER — CYCLOBENZAPRINE HCL 5 MG
5 TABLET ORAL NIGHTLY PRN
Qty: 30 TABLET | Refills: 0 | Status: SHIPPED | OUTPATIENT
Start: 2023-07-26

## 2023-07-26 RX ORDER — FOLIC ACID 1 MG/1
1 TABLET ORAL DAILY
Qty: 90 TABLET | Refills: 0 | Status: SHIPPED | OUTPATIENT
Start: 2023-07-26

## 2023-07-26 RX ORDER — METHOTREXATE 2.5 MG/1
17.5 TABLET ORAL WEEKLY
Qty: 84 TABLET | Refills: 0 | Status: SHIPPED | OUTPATIENT
Start: 2023-07-26 | End: 2023-10-24

## 2023-07-27 NOTE — PATIENT INSTRUCTIONS
-- increase methotrexate to 7 tabs once weekly  -- continue  folic acid 1mg daily  -- continue labs every 4 weeks to monitor for toxicities and monitor inflammatory markers  -- keep me posted if side effects experienced  -- update me after next eye exam, may need to consider biologic intervention for uveitis if the methotrexate is insufficent  -- discuss starting TNF inhibitor like Humira with your dermatologist given the frequent precancerous skin lesions   -- vitamin d levels to be checked with next blood draw  -- follow up in 3 months or sooner as needed  -- call with questions/concerns  -- strongly recommend you quit smoking    Dr. Demetris Gooden

## 2023-08-15 ENCOUNTER — TELEPHONE (OUTPATIENT)
Dept: RHEUMATOLOGY | Facility: CLINIC | Age: 43
End: 2023-08-15

## 2023-08-15 ENCOUNTER — LAB ENCOUNTER (OUTPATIENT)
Dept: LAB | Facility: REFERENCE LAB | Age: 43
End: 2023-08-15
Attending: INTERNAL MEDICINE
Payer: COMMERCIAL

## 2023-08-15 DIAGNOSIS — M25.50 POLYARTHRALGIA: ICD-10-CM

## 2023-08-15 DIAGNOSIS — M35.9 UNDIFFERENTIATED CONNECTIVE TISSUE DISEASE (HCC): ICD-10-CM

## 2023-08-15 DIAGNOSIS — R53.82 CHRONIC FATIGUE: ICD-10-CM

## 2023-08-15 DIAGNOSIS — R79.82 ELEVATED C-REACTIVE PROTEIN (CRP): ICD-10-CM

## 2023-08-15 DIAGNOSIS — M35.9 UNDIFFERENTIATED CONNECTIVE TISSUE DISEASE (HCC): Primary | ICD-10-CM

## 2023-08-15 LAB
ALBUMIN SERPL-MCNC: 3.7 G/DL (ref 3.4–5)
ALBUMIN/GLOB SERPL: 1.1 {RATIO} (ref 1–2)
ALP LIVER SERPL-CCNC: 83 U/L
ALT SERPL-CCNC: 36 U/L
ANION GAP SERPL CALC-SCNC: 4 MMOL/L (ref 0–18)
AST SERPL-CCNC: 26 U/L (ref 15–37)
BASOPHILS # BLD AUTO: 0.03 X10(3) UL (ref 0–0.2)
BASOPHILS NFR BLD AUTO: 0.9 %
BILIRUB SERPL-MCNC: 0.1 MG/DL (ref 0.1–2)
BUN BLD-MCNC: 10 MG/DL (ref 7–18)
BUN/CREAT SERPL: 12 (ref 10–20)
CALCIUM BLD-MCNC: 8.6 MG/DL (ref 8.5–10.1)
CHLORIDE SERPL-SCNC: 110 MMOL/L (ref 98–112)
CO2 SERPL-SCNC: 25 MMOL/L (ref 21–32)
CREAT BLD-MCNC: 0.83 MG/DL
CRP SERPL-MCNC: <0.29 MG/DL (ref ?–0.3)
DEPRECATED HBV CORE AB SER IA-ACNC: 60.6 NG/ML
DEPRECATED RDW RBC AUTO: 45.8 FL (ref 35.1–46.3)
EGFRCR SERPLBLD CKD-EPI 2021: 90 ML/MIN/1.73M2 (ref 60–?)
EOSINOPHIL # BLD AUTO: 0.02 X10(3) UL (ref 0–0.7)
EOSINOPHIL NFR BLD AUTO: 0.6 %
ERYTHROCYTE [DISTWIDTH] IN BLOOD BY AUTOMATED COUNT: 14.2 % (ref 11–15)
ERYTHROCYTE [SEDIMENTATION RATE] IN BLOOD: 25 MM/HR
FASTING STATUS PATIENT QL REPORTED: NO
GLOBULIN PLAS-MCNC: 3.4 G/DL (ref 2.8–4.4)
GLUCOSE BLD-MCNC: 109 MG/DL (ref 70–99)
HCT VFR BLD AUTO: 44 %
HGB BLD-MCNC: 14.6 G/DL
IMM GRANULOCYTES # BLD AUTO: 0.01 X10(3) UL (ref 0–1)
IMM GRANULOCYTES NFR BLD: 0.3 %
IRON SATN MFR SERPL: 12 %
IRON SERPL-MCNC: 52 UG/DL
LYMPHOCYTES # BLD AUTO: 0.94 X10(3) UL (ref 1–4)
LYMPHOCYTES NFR BLD AUTO: 27.4 %
MCH RBC QN AUTO: 29.6 PG (ref 26–34)
MCHC RBC AUTO-ENTMCNC: 33.2 G/DL (ref 31–37)
MCV RBC AUTO: 89.1 FL
MONOCYTES # BLD AUTO: 0.48 X10(3) UL (ref 0.1–1)
MONOCYTES NFR BLD AUTO: 14 %
NEUTROPHILS # BLD AUTO: 1.95 X10 (3) UL (ref 1.5–7.7)
NEUTROPHILS # BLD AUTO: 1.95 X10(3) UL (ref 1.5–7.7)
NEUTROPHILS NFR BLD AUTO: 56.8 %
OSMOLALITY SERPL CALC.SUM OF ELEC: 288 MOSM/KG (ref 275–295)
PLATELET # BLD AUTO: 246 10(3)UL (ref 150–450)
POTASSIUM SERPL-SCNC: 3.8 MMOL/L (ref 3.5–5.1)
PROT SERPL-MCNC: 7.1 G/DL (ref 6.4–8.2)
RBC # BLD AUTO: 4.94 X10(6)UL
SODIUM SERPL-SCNC: 139 MMOL/L (ref 136–145)
T4 FREE SERPL-MCNC: 0.9 NG/DL (ref 0.8–1.7)
TIBC SERPL-MCNC: 444 UG/DL (ref 240–450)
TRANSFERRIN SERPL-MCNC: 298 MG/DL (ref 200–360)
TSI SER-ACNC: 0.37 MIU/ML (ref 0.36–3.74)
VIT B12 SERPL-MCNC: 448 PG/ML (ref 193–986)
WBC # BLD AUTO: 3.4 X10(3) UL (ref 4–11)

## 2023-08-15 PROCEDURE — 84439 ASSAY OF FREE THYROXINE: CPT | Performed by: INTERNAL MEDICINE

## 2023-08-15 PROCEDURE — 82607 VITAMIN B-12: CPT | Performed by: INTERNAL MEDICINE

## 2023-08-15 PROCEDURE — 82728 ASSAY OF FERRITIN: CPT | Performed by: INTERNAL MEDICINE

## 2023-08-15 PROCEDURE — 86140 C-REACTIVE PROTEIN: CPT | Performed by: INTERNAL MEDICINE

## 2023-08-15 PROCEDURE — 83540 ASSAY OF IRON: CPT | Performed by: INTERNAL MEDICINE

## 2023-08-15 PROCEDURE — 80050 GENERAL HEALTH PANEL: CPT | Performed by: INTERNAL MEDICINE

## 2023-08-15 PROCEDURE — 85652 RBC SED RATE AUTOMATED: CPT | Performed by: INTERNAL MEDICINE

## 2023-08-15 PROCEDURE — 84466 ASSAY OF TRANSFERRIN: CPT | Performed by: INTERNAL MEDICINE

## 2023-08-15 NOTE — TELEPHONE ENCOUNTER
----- Message from Chema Landa DO sent at 8/15/2023  4:03 PM CDT -----  Please let pt know that CMP grossly normal. CBC with low WBC count. Could be medication related. Iron % sat low, clarify if taking iron supplement. Sed rate improved. CRP normalized. B12 acceptable. Repeat labs in 3-4 weeks.  (Cbc, cmp, esr, crp)    Chema Landa DO  [facility]  8/15/2023

## 2023-08-15 NOTE — TELEPHONE ENCOUNTER
Called pt and informed her of her lab results. Pt verbalized understanding. She does not take an iron supplement. Confirmed pharmacy incase one needed to be sent over. Pt will get repeat labs done in 3-4 weeks. She does state that she doesn't feel well and is having muscle aches and enlarged lymph node. She was at her pcp during the call and will update us when she can. She did hold her methotrexate this week incase she was coming down with something.

## 2023-08-16 NOTE — TELEPHONE ENCOUNTER
Called pt and she stated that she is still feeling bad and now has a red blotchy rash that doesn't itch. She will keep in contact with her pcp and will hold the methotrexate until she feels better.

## 2023-09-13 ENCOUNTER — LAB ENCOUNTER (OUTPATIENT)
Dept: LAB | Age: 43
End: 2023-09-13
Attending: INTERNAL MEDICINE
Payer: COMMERCIAL

## 2023-09-13 DIAGNOSIS — M25.50 POLYARTHRALGIA: ICD-10-CM

## 2023-09-13 DIAGNOSIS — R79.82 ELEVATED C-REACTIVE PROTEIN (CRP): ICD-10-CM

## 2023-09-13 DIAGNOSIS — M35.9 UNDIFFERENTIATED CONNECTIVE TISSUE DISEASE (HCC): ICD-10-CM

## 2023-09-13 LAB
ALBUMIN SERPL-MCNC: 3.6 G/DL (ref 3.4–5)
ALBUMIN/GLOB SERPL: 1 {RATIO} (ref 1–2)
ALP LIVER SERPL-CCNC: 83 U/L
ALT SERPL-CCNC: 35 U/L
ANION GAP SERPL CALC-SCNC: 5 MMOL/L (ref 0–18)
AST SERPL-CCNC: 17 U/L (ref 15–37)
BASOPHILS # BLD AUTO: 0.06 X10(3) UL (ref 0–0.2)
BASOPHILS NFR BLD AUTO: 0.9 %
BILIRUB SERPL-MCNC: 0.1 MG/DL (ref 0.1–2)
BUN BLD-MCNC: 13 MG/DL (ref 7–18)
BUN/CREAT SERPL: 16 (ref 10–20)
CALCIUM BLD-MCNC: 8.9 MG/DL (ref 8.5–10.1)
CHLORIDE SERPL-SCNC: 110 MMOL/L (ref 98–112)
CO2 SERPL-SCNC: 26 MMOL/L (ref 21–32)
CREAT BLD-MCNC: 0.81 MG/DL
CRP SERPL-MCNC: 0.42 MG/DL (ref ?–0.3)
DEPRECATED RDW RBC AUTO: 46.2 FL (ref 35.1–46.3)
EGFRCR SERPLBLD CKD-EPI 2021: 93 ML/MIN/1.73M2 (ref 60–?)
EOSINOPHIL # BLD AUTO: 0.16 X10(3) UL (ref 0–0.7)
EOSINOPHIL NFR BLD AUTO: 2.3 %
ERYTHROCYTE [DISTWIDTH] IN BLOOD BY AUTOMATED COUNT: 13.5 % (ref 11–15)
ERYTHROCYTE [SEDIMENTATION RATE] IN BLOOD: 20 MM/HR
FASTING STATUS PATIENT QL REPORTED: NO
GLOBULIN PLAS-MCNC: 3.5 G/DL (ref 2.8–4.4)
GLUCOSE BLD-MCNC: 118 MG/DL (ref 70–99)
HCT VFR BLD AUTO: 43.6 %
HGB BLD-MCNC: 14.2 G/DL
IMM GRANULOCYTES # BLD AUTO: 0.01 X10(3) UL (ref 0–1)
IMM GRANULOCYTES NFR BLD: 0.1 %
LYMPHOCYTES # BLD AUTO: 2.15 X10(3) UL (ref 1–4)
LYMPHOCYTES NFR BLD AUTO: 31.5 %
MCH RBC QN AUTO: 30.2 PG (ref 26–34)
MCHC RBC AUTO-ENTMCNC: 32.6 G/DL (ref 31–37)
MCV RBC AUTO: 92.8 FL
MONOCYTES # BLD AUTO: 0.67 X10(3) UL (ref 0.1–1)
MONOCYTES NFR BLD AUTO: 9.8 %
NEUTROPHILS # BLD AUTO: 3.78 X10 (3) UL (ref 1.5–7.7)
NEUTROPHILS # BLD AUTO: 3.78 X10(3) UL (ref 1.5–7.7)
NEUTROPHILS NFR BLD AUTO: 55.4 %
OSMOLALITY SERPL CALC.SUM OF ELEC: 293 MOSM/KG (ref 275–295)
PLATELET # BLD AUTO: 287 10(3)UL (ref 150–450)
POTASSIUM SERPL-SCNC: 3.9 MMOL/L (ref 3.5–5.1)
PROT SERPL-MCNC: 7.1 G/DL (ref 6.4–8.2)
RBC # BLD AUTO: 4.7 X10(6)UL
SODIUM SERPL-SCNC: 141 MMOL/L (ref 136–145)
WBC # BLD AUTO: 6.8 X10(3) UL (ref 4–11)

## 2023-09-13 PROCEDURE — 85025 COMPLETE CBC W/AUTO DIFF WBC: CPT | Performed by: INTERNAL MEDICINE

## 2023-09-13 PROCEDURE — 80053 COMPREHEN METABOLIC PANEL: CPT | Performed by: INTERNAL MEDICINE

## 2023-09-13 PROCEDURE — 86140 C-REACTIVE PROTEIN: CPT | Performed by: INTERNAL MEDICINE

## 2023-09-13 PROCEDURE — 85652 RBC SED RATE AUTOMATED: CPT | Performed by: INTERNAL MEDICINE

## 2023-09-20 ENCOUNTER — HOSPITAL ENCOUNTER (OUTPATIENT)
Dept: MAMMOGRAPHY | Age: 43
Discharge: HOME | End: 2023-09-20
Attending: OBSTETRICS & GYNECOLOGY
Payer: COMMERCIAL

## 2023-09-20 DIAGNOSIS — Z12.31 ENCOUNTER FOR SCREENING MAMMOGRAM FOR MALIGNANT NEOPLASM OF BREAST: ICD-10-CM

## 2023-09-20 PROCEDURE — 77063 BREAST TOMOSYNTHESIS BI: CPT | Performed by: OBSTETRICS & GYNECOLOGY

## 2023-09-20 PROCEDURE — 77067 SCR MAMMO BI INCL CAD: CPT | Performed by: OBSTETRICS & GYNECOLOGY

## 2023-10-07 DIAGNOSIS — E55.9 VITAMIN D DEFICIENCY: ICD-10-CM

## 2023-10-21 DIAGNOSIS — M35.9 UNDIFFERENTIATED CONNECTIVE TISSUE DISEASE (HCC): ICD-10-CM

## 2023-10-21 DIAGNOSIS — M25.50 POLYARTHRALGIA: ICD-10-CM

## 2023-10-23 ENCOUNTER — LAB ENCOUNTER (OUTPATIENT)
Dept: LAB | Facility: REFERENCE LAB | Age: 43
End: 2023-10-23
Attending: INTERNAL MEDICINE

## 2023-10-23 DIAGNOSIS — M25.50 POLYARTHRALGIA: ICD-10-CM

## 2023-10-23 DIAGNOSIS — R79.82 ELEVATED C-REACTIVE PROTEIN (CRP): ICD-10-CM

## 2023-10-23 DIAGNOSIS — E55.9 VITAMIN D DEFICIENCY: ICD-10-CM

## 2023-10-23 DIAGNOSIS — Z11.59 NEED FOR HEPATITIS B SCREENING TEST: ICD-10-CM

## 2023-10-23 DIAGNOSIS — M35.9 UNDIFFERENTIATED CONNECTIVE TISSUE DISEASE (HCC): ICD-10-CM

## 2023-10-23 LAB
ALBUMIN SERPL-MCNC: 3.8 G/DL (ref 3.4–5)
ALBUMIN/GLOB SERPL: 1 {RATIO} (ref 1–2)
ALP LIVER SERPL-CCNC: 77 U/L
ALT SERPL-CCNC: 33 U/L
ANION GAP SERPL CALC-SCNC: 8 MMOL/L (ref 0–18)
AST SERPL-CCNC: 18 U/L (ref 15–37)
BASOPHILS # BLD AUTO: 0.05 X10(3) UL (ref 0–0.2)
BASOPHILS NFR BLD AUTO: 0.8 %
BILIRUB SERPL-MCNC: 0.3 MG/DL (ref 0.1–2)
BUN BLD-MCNC: 11 MG/DL (ref 7–18)
BUN/CREAT SERPL: 12.9 (ref 10–20)
CALCIUM BLD-MCNC: 9.1 MG/DL (ref 8.5–10.1)
CHLORIDE SERPL-SCNC: 109 MMOL/L (ref 98–112)
CO2 SERPL-SCNC: 24 MMOL/L (ref 21–32)
CREAT BLD-MCNC: 0.85 MG/DL
CRP SERPL-MCNC: <0.29 MG/DL (ref ?–0.3)
DEPRECATED RDW RBC AUTO: 45.9 FL (ref 35.1–46.3)
EGFRCR SERPLBLD CKD-EPI 2021: 88 ML/MIN/1.73M2 (ref 60–?)
EOSINOPHIL # BLD AUTO: 0.11 X10(3) UL (ref 0–0.7)
EOSINOPHIL NFR BLD AUTO: 1.8 %
ERYTHROCYTE [DISTWIDTH] IN BLOOD BY AUTOMATED COUNT: 13.5 % (ref 11–15)
ERYTHROCYTE [SEDIMENTATION RATE] IN BLOOD: 37 MM/HR
FASTING STATUS PATIENT QL REPORTED: NO
GLOBULIN PLAS-MCNC: 3.8 G/DL (ref 2.8–4.4)
GLUCOSE BLD-MCNC: 113 MG/DL (ref 70–99)
HCT VFR BLD AUTO: 45.7 %
HGB BLD-MCNC: 15.2 G/DL
IMM GRANULOCYTES # BLD AUTO: 0.01 X10(3) UL (ref 0–1)
IMM GRANULOCYTES NFR BLD: 0.2 %
LYMPHOCYTES # BLD AUTO: 1.9 X10(3) UL (ref 1–4)
LYMPHOCYTES NFR BLD AUTO: 31.5 %
MCH RBC QN AUTO: 30.2 PG (ref 26–34)
MCHC RBC AUTO-ENTMCNC: 33.3 G/DL (ref 31–37)
MCV RBC AUTO: 90.7 FL
MONOCYTES # BLD AUTO: 0.56 X10(3) UL (ref 0.1–1)
MONOCYTES NFR BLD AUTO: 9.3 %
NEUTROPHILS # BLD AUTO: 3.41 X10 (3) UL (ref 1.5–7.7)
NEUTROPHILS # BLD AUTO: 3.41 X10(3) UL (ref 1.5–7.7)
NEUTROPHILS NFR BLD AUTO: 56.4 %
OSMOLALITY SERPL CALC.SUM OF ELEC: 292 MOSM/KG (ref 275–295)
PLATELET # BLD AUTO: 316 10(3)UL (ref 150–450)
POTASSIUM SERPL-SCNC: 4.1 MMOL/L (ref 3.5–5.1)
PROT SERPL-MCNC: 7.6 G/DL (ref 6.4–8.2)
RBC # BLD AUTO: 5.04 X10(6)UL
SODIUM SERPL-SCNC: 141 MMOL/L (ref 136–145)
VIT D+METAB SERPL-MCNC: 39.6 NG/ML (ref 30–100)
WBC # BLD AUTO: 6 X10(3) UL (ref 4–11)

## 2023-10-23 PROCEDURE — 86140 C-REACTIVE PROTEIN: CPT | Performed by: INTERNAL MEDICINE

## 2023-10-23 PROCEDURE — 85652 RBC SED RATE AUTOMATED: CPT | Performed by: INTERNAL MEDICINE

## 2023-10-23 PROCEDURE — 80053 COMPREHEN METABOLIC PANEL: CPT | Performed by: INTERNAL MEDICINE

## 2023-10-23 PROCEDURE — 82306 VITAMIN D 25 HYDROXY: CPT | Performed by: INTERNAL MEDICINE

## 2023-10-23 PROCEDURE — 86704 HEP B CORE ANTIBODY TOTAL: CPT | Performed by: INTERNAL MEDICINE

## 2023-10-23 PROCEDURE — 85025 COMPLETE CBC W/AUTO DIFF WBC: CPT | Performed by: INTERNAL MEDICINE

## 2023-10-23 PROCEDURE — 86706 HEP B SURFACE ANTIBODY: CPT | Performed by: INTERNAL MEDICINE

## 2023-10-23 NOTE — TELEPHONE ENCOUNTER
LOV: 7/26/2023    RTC: 3 months   Future Appointments   Date Time Provider Pan Braxton   10/25/2023 12:45 PM YossiJayna barr DO [facility]   1/31/2024  9:00 AM YossiJayna barr DO [facility]   LABS:   Component      Latest Ref Rng 9/13/2023   WBC      4.0 - 11.0 x10(3) uL 6.8    RBC      3.80 - 5.30 x10(6)uL 4.70    Hemoglobin      12.0 - 16.0 g/dL 14.2    Hematocrit      35.0 - 48.0 % 43.6    MCV      80.0 - 100.0 fL 92.8    MCH      26.0 - 34.0 pg 30.2    MCHC      31.0 - 37.0 g/dL 32.6    RDW-SD      35.1 - 46.3 fL 46.2    RDW      11.0 - 15.0 % 13.5    Platelet Count      763.2 - 450.0 10(3)uL 287.0    Prelim Neutrophil Abs      1.50 - 7.70 x10 (3) uL 3.78    Neutrophils Absolute      1.50 - 7.70 x10(3) uL 3.78    Lymphocytes Absolute      1.00 - 4.00 x10(3) uL 2.15    Monocytes Absolute      0.10 - 1.00 x10(3) uL 0.67    Eosinophils Absolute      0.00 - 0.70 x10(3) uL 0.16    Basophils Absolute      0.00 - 0.20 x10(3) uL 0.06    Immature Granulocyte Absolute      0.00 - 1.00 x10(3) uL 0.01    Neutrophils %      % 55.4    Lymphocytes %      % 31.5    Monocytes %      % 9.8    Eosinophils %      % 2.3    Basophils %      % 0.9    Immature Granulocyte %      % 0.1        Component      Latest Ref Rng 9/13/2023   Glucose      70 - 99 mg/dL 118 (H)    Sodium      136 - 145 mmol/L 141    Potassium      3.5 - 5.1 mmol/L 3.9    Chloride      98 - 112 mmol/L 110    Carbon Dioxide, Total      21.0 - 32.0 mmol/L 26.0    ANION GAP      0 - 18 mmol/L 5    BUN      7 - 18 mg/dL 13    CREATININE      0.55 - 1.02 mg/dL 0.81    BUN/CREATININE RATIO      10.0 - 20.0  16.0    CALCIUM      8.5 - 10.1 mg/dL 8.9    CALCULATED OSMOLALITY      275 - 295 mOsm/kg 293    EGFR      >=60 mL/min/1.73m2 93    ALT (SGPT)      13 - 56 U/L 35    AST (SGOT)      15 - 37 U/L 17    ALKALINE PHOSPHATASE      37 - 98 U/L 83    Total Bilirubin      0.1 - 2.0 mg/dL 0.1    PROTEIN, TOTAL      6.4 - 8.2 g/dL 7.1    Albumin 3.4 - 5.0 g/dL 3.6    Globulin      2.8 - 4.4 g/dL 3.5    A/G Ratio      1.0 - 2.0  1.0    Patient Fasting for CMP? No       Legend:  (H) High  Component      Latest Ref Rng 9/13/2023   SED RATE      0 - 20 mm/Hr 20        Component      Latest Ref Rng 9/13/2023   C-REACTIVE PROTEIN      <0.30 mg/dL 0.42 (H)       Legend:  (H) High    LAST REFILL: 7/26/2023, Quantity 84 tablets, Refills 0    Dr Lucina Goodman-- orders pending, approve if agreeable.

## 2023-10-25 ENCOUNTER — OFFICE VISIT (OUTPATIENT)
Dept: RHEUMATOLOGY | Facility: CLINIC | Age: 43
End: 2023-10-25

## 2023-10-25 VITALS
TEMPERATURE: 98 F | BODY MASS INDEX: 36.25 KG/M2 | OXYGEN SATURATION: 100 % | RESPIRATION RATE: 16 BRPM | HEIGHT: 61 IN | HEART RATE: 96 BPM | DIASTOLIC BLOOD PRESSURE: 60 MMHG | SYSTOLIC BLOOD PRESSURE: 114 MMHG | WEIGHT: 192 LBS

## 2023-10-25 DIAGNOSIS — G89.29 CHRONIC SI JOINT PAIN: ICD-10-CM

## 2023-10-25 DIAGNOSIS — Z11.1 SCREENING FOR TUBERCULOSIS: ICD-10-CM

## 2023-10-25 DIAGNOSIS — H20.9 UVEITIS: ICD-10-CM

## 2023-10-25 DIAGNOSIS — Z79.899 HIGH RISK MEDICATION USE: ICD-10-CM

## 2023-10-25 DIAGNOSIS — M35.9 UNDIFFERENTIATED CONNECTIVE TISSUE DISEASE (HCC): Primary | ICD-10-CM

## 2023-10-25 DIAGNOSIS — D84.821 IMMUNOCOMPROMISED STATE DUE TO DRUG THERAPY: ICD-10-CM

## 2023-10-25 DIAGNOSIS — Z11.59 NEED FOR HEPATITIS C SCREENING TEST: ICD-10-CM

## 2023-10-25 DIAGNOSIS — M25.50 POLYARTHRALGIA: ICD-10-CM

## 2023-10-25 DIAGNOSIS — M53.3 CHRONIC SI JOINT PAIN: ICD-10-CM

## 2023-10-25 DIAGNOSIS — R79.82 ELEVATED C-REACTIVE PROTEIN (CRP): ICD-10-CM

## 2023-10-25 DIAGNOSIS — Z71.6 ENCOUNTER FOR SMOKING CESSATION COUNSELING: ICD-10-CM

## 2023-10-25 DIAGNOSIS — M54.89 INFLAMMATORY BACK PAIN: ICD-10-CM

## 2023-10-25 DIAGNOSIS — Z79.899 IMMUNOCOMPROMISED STATE DUE TO DRUG THERAPY: ICD-10-CM

## 2023-10-25 DIAGNOSIS — Z11.59 NEED FOR HEPATITIS B SCREENING TEST: ICD-10-CM

## 2023-10-25 LAB
HBV CORE AB SERPL QL IA: NONREACTIVE
HBV SURFACE AB SER QL: REACTIVE
HBV SURFACE AB SERPL IA-ACNC: >1000 MIU/ML

## 2023-10-25 PROCEDURE — 3074F SYST BP LT 130 MM HG: CPT | Performed by: INTERNAL MEDICINE

## 2023-10-25 PROCEDURE — 3008F BODY MASS INDEX DOCD: CPT | Performed by: INTERNAL MEDICINE

## 2023-10-25 PROCEDURE — 99214 OFFICE O/P EST MOD 30 MIN: CPT | Performed by: INTERNAL MEDICINE

## 2023-10-25 PROCEDURE — 3078F DIAST BP <80 MM HG: CPT | Performed by: INTERNAL MEDICINE

## 2023-10-25 RX ORDER — PREDNISOLONE ACETATE 10 MG/ML
1 SUSPENSION/ DROPS OPHTHALMIC 4 TIMES DAILY
COMMUNITY
Start: 2023-09-15

## 2023-10-26 RX ORDER — ERGOCALCIFEROL 1.25 MG/1
50000 CAPSULE ORAL WEEKLY
Qty: 12 CAPSULE | Refills: 0 | OUTPATIENT
Start: 2023-10-26 | End: 2024-01-18

## 2023-10-27 DIAGNOSIS — M35.9 UNDIFFERENTIATED CONNECTIVE TISSUE DISEASE (HCC): ICD-10-CM

## 2023-10-27 DIAGNOSIS — M25.50 POLYARTHRALGIA: ICD-10-CM

## 2023-10-27 RX ORDER — FOLIC ACID 1 MG/1
1 TABLET ORAL DAILY
Qty: 90 TABLET | Refills: 0 | Status: SHIPPED | OUTPATIENT
Start: 2023-10-27

## 2023-10-27 NOTE — TELEPHONE ENCOUNTER
Future Appointments   Date Time Provider Pan Braxton   1/31/2024  9:00 AM Gareth Ruggiero, DO [facility]   5/8/2024 11:30 AM Kristal Sy, DO [facility]     Last office visit: 10/25/2023    Last fill: 7/26/2023 90 tab, 0 refills

## 2023-11-17 ENCOUNTER — LAB ENCOUNTER (OUTPATIENT)
Dept: LAB | Facility: REFERENCE LAB | Age: 43
End: 2023-11-17
Attending: INTERNAL MEDICINE
Payer: COMMERCIAL

## 2023-11-17 DIAGNOSIS — M54.89 INFLAMMATORY BACK PAIN: ICD-10-CM

## 2023-11-17 DIAGNOSIS — M53.3 CHRONIC SI JOINT PAIN: ICD-10-CM

## 2023-11-17 DIAGNOSIS — H20.9 UVEITIS: ICD-10-CM

## 2023-11-17 DIAGNOSIS — Z79.899 HIGH RISK MEDICATION USE: ICD-10-CM

## 2023-11-17 DIAGNOSIS — R79.82 ELEVATED C-REACTIVE PROTEIN (CRP): ICD-10-CM

## 2023-11-17 DIAGNOSIS — M25.50 POLYARTHRALGIA: ICD-10-CM

## 2023-11-17 DIAGNOSIS — G89.29 CHRONIC SI JOINT PAIN: ICD-10-CM

## 2023-11-17 DIAGNOSIS — Z79.899 IMMUNOCOMPROMISED STATE DUE TO DRUG THERAPY: ICD-10-CM

## 2023-11-17 DIAGNOSIS — D84.821 IMMUNOCOMPROMISED STATE DUE TO DRUG THERAPY: ICD-10-CM

## 2023-11-17 DIAGNOSIS — M35.9 UNDIFFERENTIATED CONNECTIVE TISSUE DISEASE (HCC): ICD-10-CM

## 2023-11-17 DIAGNOSIS — Z11.1 SCREENING FOR TUBERCULOSIS: ICD-10-CM

## 2023-11-17 DIAGNOSIS — Z11.59 NEED FOR HEPATITIS B SCREENING TEST: ICD-10-CM

## 2023-11-17 DIAGNOSIS — Z11.59 NEED FOR HEPATITIS C SCREENING TEST: ICD-10-CM

## 2023-11-17 LAB
ALBUMIN SERPL-MCNC: 4.5 G/DL (ref 3.2–4.8)
ALBUMIN/GLOB SERPL: 1.5 {RATIO} (ref 1–2)
ALP LIVER SERPL-CCNC: 76 U/L
ALT SERPL-CCNC: 29 U/L
ANION GAP SERPL CALC-SCNC: 7 MMOL/L (ref 0–18)
AST SERPL-CCNC: 22 U/L (ref ?–34)
BASOPHILS # BLD AUTO: 0.06 X10(3) UL (ref 0–0.2)
BASOPHILS NFR BLD AUTO: 0.9 %
BILIRUB SERPL-MCNC: 0.3 MG/DL (ref 0.3–1.2)
BUN BLD-MCNC: 13 MG/DL (ref 9–23)
BUN/CREAT SERPL: 16 (ref 10–20)
CALCIUM BLD-MCNC: 9.6 MG/DL (ref 8.7–10.4)
CHLORIDE SERPL-SCNC: 108 MMOL/L (ref 98–112)
CO2 SERPL-SCNC: 23 MMOL/L (ref 21–32)
CREAT BLD-MCNC: 0.81 MG/DL
CRP SERPL-MCNC: 0.5 MG/DL (ref ?–1)
DEPRECATED RDW RBC AUTO: 45.6 FL (ref 35.1–46.3)
EGFRCR SERPLBLD CKD-EPI 2021: 93 ML/MIN/1.73M2 (ref 60–?)
EOSINOPHIL # BLD AUTO: 0.09 X10(3) UL (ref 0–0.7)
EOSINOPHIL NFR BLD AUTO: 1.4 %
ERYTHROCYTE [DISTWIDTH] IN BLOOD BY AUTOMATED COUNT: 13.6 % (ref 11–15)
ERYTHROCYTE [SEDIMENTATION RATE] IN BLOOD: 39 MM/HR
FASTING STATUS PATIENT QL REPORTED: NO
GLOBULIN PLAS-MCNC: 3 G/DL (ref 2.8–4.4)
GLUCOSE BLD-MCNC: 103 MG/DL (ref 70–99)
HBV CORE IGM SER QL: NONREACTIVE
HCT VFR BLD AUTO: 47 %
HCV AB SERPL QL IA: NONREACTIVE
HGB BLD-MCNC: 15.3 G/DL
IMM GRANULOCYTES # BLD AUTO: 0.01 X10(3) UL (ref 0–1)
IMM GRANULOCYTES NFR BLD: 0.2 %
LYMPHOCYTES # BLD AUTO: 2.13 X10(3) UL (ref 1–4)
LYMPHOCYTES NFR BLD AUTO: 33.2 %
MCH RBC QN AUTO: 29.6 PG (ref 26–34)
MCHC RBC AUTO-ENTMCNC: 32.6 G/DL (ref 31–37)
MCV RBC AUTO: 90.9 FL
MONOCYTES # BLD AUTO: 0.49 X10(3) UL (ref 0.1–1)
MONOCYTES NFR BLD AUTO: 7.6 %
NEUTROPHILS # BLD AUTO: 3.64 X10 (3) UL (ref 1.5–7.7)
NEUTROPHILS # BLD AUTO: 3.64 X10(3) UL (ref 1.5–7.7)
NEUTROPHILS NFR BLD AUTO: 56.7 %
OSMOLALITY SERPL CALC.SUM OF ELEC: 286 MOSM/KG (ref 275–295)
PLATELET # BLD AUTO: 344 10(3)UL (ref 150–450)
POTASSIUM SERPL-SCNC: 4.1 MMOL/L (ref 3.5–5.1)
PROT SERPL-MCNC: 7.5 G/DL (ref 5.7–8.2)
RBC # BLD AUTO: 5.17 X10(6)UL
SODIUM SERPL-SCNC: 138 MMOL/L (ref 136–145)
WBC # BLD AUTO: 6.4 X10(3) UL (ref 4–11)

## 2023-11-17 PROCEDURE — 86803 HEPATITIS C AB TEST: CPT | Performed by: INTERNAL MEDICINE

## 2023-11-17 PROCEDURE — 85652 RBC SED RATE AUTOMATED: CPT | Performed by: INTERNAL MEDICINE

## 2023-11-17 PROCEDURE — 80053 COMPREHEN METABOLIC PANEL: CPT | Performed by: INTERNAL MEDICINE

## 2023-11-17 PROCEDURE — 85025 COMPLETE CBC W/AUTO DIFF WBC: CPT | Performed by: INTERNAL MEDICINE

## 2023-11-17 PROCEDURE — 86480 TB TEST CELL IMMUN MEASURE: CPT | Performed by: INTERNAL MEDICINE

## 2023-11-17 PROCEDURE — 81374 HLA I TYPING 1 ANTIGEN LR: CPT | Performed by: INTERNAL MEDICINE

## 2023-11-17 PROCEDURE — 86705 HEP B CORE ANTIBODY IGM: CPT | Performed by: INTERNAL MEDICINE

## 2023-11-17 PROCEDURE — 86140 C-REACTIVE PROTEIN: CPT | Performed by: INTERNAL MEDICINE

## 2023-11-20 LAB
M TB IFN-G CD4+ T-CELLS BLD-ACNC: 0.04 IU/ML
M TB TUBERC IFN-G BLD QL: NEGATIVE
M TB TUBERC IGNF/MITOGEN IGNF CONTROL: >10 IU/ML
QFT TB1 AG MINUS NIL: 0.03 IU/ML
QFT TB2 AG MINUS NIL: 0.03 IU/ML

## 2023-11-21 ENCOUNTER — TELEPHONE (OUTPATIENT)
Dept: RHEUMATOLOGY | Facility: CLINIC | Age: 43
End: 2023-11-21

## 2023-11-21 DIAGNOSIS — M35.9 UNDIFFERENTIATED CONNECTIVE TISSUE DISEASE (HCC): Primary | ICD-10-CM

## 2023-11-21 RX ORDER — METHYLPREDNISOLONE 4 MG/1
TABLET ORAL
Qty: 1 EACH | Refills: 0 | Status: SHIPPED | OUTPATIENT
Start: 2023-11-21

## 2023-11-21 NOTE — TELEPHONE ENCOUNTER
Patient states that she has general achyness all over and was wondering if it would be possible to get a medrol dose pack sent in to her Pharmacy for her to take so she feels better for the Holiday? She denies any swelling, fever, red or hot to the touch areas. She states that she feels the weather isn't helping her much.  Please advise     Medrol Dose pack pended for your approval if you are agreeable

## 2023-11-24 LAB — HLA-B27: NEGATIVE

## 2023-12-19 ENCOUNTER — TELEPHONE (OUTPATIENT)
Dept: RHEUMATOLOGY | Facility: CLINIC | Age: 43
End: 2023-12-19

## 2023-12-19 NOTE — TELEPHONE ENCOUNTER
PA for Humira approved. Effective 12/8/23-12/8/24. [facility] able to fill. [phone].

## 2023-12-27 ENCOUNTER — NURSE ONLY (OUTPATIENT)
Dept: RHEUMATOLOGY | Facility: CLINIC | Age: 43
End: 2023-12-27
Payer: COMMERCIAL

## 2023-12-27 PROCEDURE — 99211 OFF/OP EST MAY X REQ PHY/QHP: CPT | Performed by: INTERNAL MEDICINE

## 2023-12-27 NOTE — PROGRESS NOTES
Pt instructed on use of Humara single use pen. Pt verbalized understanding then proceeded to inject with Demo pen successfully making sure to hold plunger against skin for 10 seconds following injection. Pt then self administered Humara 80mg pen to LUQ on abdomen 2 inches from umbilicus away from scars, stretch marks, rash, or lesions. Instructed to hold dose if experiencing and flu/cold signs/symptoms until they subside and she can always contact office with questions and or any new reaction symptom from Humara medication.      [id number]  LOT [id number]  EXP 01/2025

## 2024-01-20 DIAGNOSIS — M35.9 UNDIFFERENTIATED CONNECTIVE TISSUE DISEASE (HCC): ICD-10-CM

## 2024-01-20 DIAGNOSIS — M25.50 POLYARTHRALGIA: ICD-10-CM

## 2024-01-22 RX ORDER — METHOTREXATE 2.5 MG/1
17.5 TABLET ORAL WEEKLY
Qty: 84 TABLET | Refills: 0 | Status: SHIPPED | OUTPATIENT
Start: 2024-01-22

## 2024-01-22 NOTE — TELEPHONE ENCOUNTER
Last office visit: 10/25/2023    Next Rheum Apt:1/31/2024 Yossi Maggie,     Last fill: 10/23/2023  84 tabs, 0 refills     Labs:   Lab Results   Component Value Date    CREATSERUM 0.81 11/17/2023    GFR >59 12/04/2010    ALKPHO 76 11/17/2023    AST 22 11/17/2023    ALT 29 11/17/2023    BILT 0.3 11/17/2023    TP 7.5 11/17/2023    ALB 4.5 11/17/2023       Lab Results   Component Value Date    WBC 6.4 11/17/2023    HGB 15.3 11/17/2023    .0 11/17/2023    NEPRELIM 3.64 11/17/2023    NEPERCENT 56.7 11/17/2023    LYPERCENT 33.2 11/17/2023    NE 3.64 11/17/2023    LYMABS 2.13 11/17/2023

## 2024-01-24 ENCOUNTER — PATIENT MESSAGE (OUTPATIENT)
Dept: RHEUMATOLOGY | Facility: CLINIC | Age: 44
End: 2024-01-24

## 2024-01-24 ENCOUNTER — LAB ENCOUNTER (OUTPATIENT)
Dept: LAB | Facility: REFERENCE LAB | Age: 44
End: 2024-01-24
Payer: COMMERCIAL

## 2024-01-24 DIAGNOSIS — M35.9 UNDIFFERENTIATED CONNECTIVE TISSUE DISEASE (HCC): Primary | ICD-10-CM

## 2024-01-24 DIAGNOSIS — Z79.899 HIGH RISK MEDICATION USE: ICD-10-CM

## 2024-01-24 DIAGNOSIS — R79.82 ELEVATED C-REACTIVE PROTEIN (CRP): ICD-10-CM

## 2024-01-24 DIAGNOSIS — M35.9 UNDIFFERENTIATED CONNECTIVE TISSUE DISEASE (HCC): ICD-10-CM

## 2024-01-24 DIAGNOSIS — M25.50 POLYARTHRALGIA: ICD-10-CM

## 2024-01-24 LAB
ALBUMIN SERPL-MCNC: 4.6 G/DL (ref 3.2–4.8)
ALBUMIN/GLOB SERPL: 1.8 {RATIO} (ref 1–2)
ALP LIVER SERPL-CCNC: 75 U/L
ALT SERPL-CCNC: 24 U/L
ANION GAP SERPL CALC-SCNC: 7 MMOL/L (ref 0–18)
AST SERPL-CCNC: 21 U/L (ref ?–34)
BASOPHILS # BLD AUTO: 0.09 X10(3) UL (ref 0–0.2)
BASOPHILS NFR BLD AUTO: 1.2 %
BILIRUB SERPL-MCNC: 0.2 MG/DL (ref 0.3–1.2)
BUN BLD-MCNC: 12 MG/DL (ref 9–23)
BUN/CREAT SERPL: 14.8 (ref 10–20)
CALCIUM BLD-MCNC: 9.1 MG/DL (ref 8.7–10.4)
CHLORIDE SERPL-SCNC: 109 MMOL/L (ref 98–112)
CO2 SERPL-SCNC: 24 MMOL/L (ref 21–32)
CREAT BLD-MCNC: 0.81 MG/DL
CRP SERPL-MCNC: <0.4 MG/DL (ref ?–1)
DEPRECATED RDW RBC AUTO: 44.6 FL (ref 35.1–46.3)
EGFRCR SERPLBLD CKD-EPI 2021: 93 ML/MIN/1.73M2 (ref 60–?)
EOSINOPHIL # BLD AUTO: 0.09 X10(3) UL (ref 0–0.7)
EOSINOPHIL NFR BLD AUTO: 1.2 %
ERYTHROCYTE [DISTWIDTH] IN BLOOD BY AUTOMATED COUNT: 13.6 % (ref 11–15)
ERYTHROCYTE [SEDIMENTATION RATE] IN BLOOD: 21 MM/HR
FASTING STATUS PATIENT QL REPORTED: NO
GLOBULIN PLAS-MCNC: 2.6 G/DL (ref 2.8–4.4)
GLUCOSE BLD-MCNC: 73 MG/DL (ref 70–99)
HCT VFR BLD AUTO: 42 %
HGB BLD-MCNC: 14.5 G/DL
IMM GRANULOCYTES # BLD AUTO: 0.01 X10(3) UL (ref 0–1)
IMM GRANULOCYTES NFR BLD: 0.1 %
LYMPHOCYTES # BLD AUTO: 2.98 X10(3) UL (ref 1–4)
LYMPHOCYTES NFR BLD AUTO: 39 %
MCH RBC QN AUTO: 30.9 PG (ref 26–34)
MCHC RBC AUTO-ENTMCNC: 34.5 G/DL (ref 31–37)
MCV RBC AUTO: 89.4 FL
MONOCYTES # BLD AUTO: 0.72 X10(3) UL (ref 0.1–1)
MONOCYTES NFR BLD AUTO: 9.4 %
NEUTROPHILS # BLD AUTO: 3.76 X10 (3) UL (ref 1.5–7.7)
NEUTROPHILS # BLD AUTO: 3.76 X10(3) UL (ref 1.5–7.7)
NEUTROPHILS NFR BLD AUTO: 49.1 %
OSMOLALITY SERPL CALC.SUM OF ELEC: 288 MOSM/KG (ref 275–295)
PLATELET # BLD AUTO: 325 10(3)UL (ref 150–450)
POTASSIUM SERPL-SCNC: 3.8 MMOL/L (ref 3.5–5.1)
PROT SERPL-MCNC: 7.2 G/DL (ref 5.7–8.2)
RBC # BLD AUTO: 4.7 X10(6)UL
SODIUM SERPL-SCNC: 140 MMOL/L (ref 136–145)
WBC # BLD AUTO: 7.7 X10(3) UL (ref 4–11)

## 2024-01-24 PROCEDURE — 85652 RBC SED RATE AUTOMATED: CPT

## 2024-01-24 PROCEDURE — 85025 COMPLETE CBC W/AUTO DIFF WBC: CPT

## 2024-01-24 PROCEDURE — 80053 COMPREHEN METABOLIC PANEL: CPT

## 2024-01-24 PROCEDURE — 86140 C-REACTIVE PROTEIN: CPT

## 2024-01-24 PROCEDURE — 36415 COLL VENOUS BLD VENIPUNCTURE: CPT

## 2024-01-24 NOTE — TELEPHONE ENCOUNTER
Future Appointments   Date Time Provider Department Center   1/31/2024  9:00 AM Maggie Sy DO [facility]   5/8/2024 10:45 AM Maggie Sy DO [facility]

## 2024-01-24 NOTE — TELEPHONE ENCOUNTER
From: Amelie Gonzalez  To: Maggie Sy  Sent: 1/24/2024 8:42 AM CST  Subject: Labs    I received a message from your office for a reminder to get lab work.  I went to lab, Unfortunately the lab doesn't have any orders from you .       Thank you  Amelie

## 2024-01-26 DIAGNOSIS — M35.9 UNDIFFERENTIATED CONNECTIVE TISSUE DISEASE (HCC): ICD-10-CM

## 2024-01-26 DIAGNOSIS — M25.50 POLYARTHRALGIA: ICD-10-CM

## 2024-01-26 RX ORDER — FOLIC ACID 1 MG/1
1 TABLET ORAL DAILY
Qty: 90 TABLET | Refills: 0 | Status: SHIPPED | OUTPATIENT
Start: 2024-01-26

## 2024-01-26 NOTE — TELEPHONE ENCOUNTER
LOV:   10/25/2023      Future Appointments   Date Time Provider Department Center   1/31/2024  9:00 AM Maggie Sy DO [facility]   5/8/2024 10:45 AM Maggie Sy DO [facility]       LF:  10/27/2023   QTY:  90   Refills:  0

## 2024-01-31 ENCOUNTER — OFFICE VISIT (OUTPATIENT)
Dept: RHEUMATOLOGY | Facility: CLINIC | Age: 44
End: 2024-01-31
Payer: COMMERCIAL

## 2024-01-31 VITALS
DIASTOLIC BLOOD PRESSURE: 72 MMHG | OXYGEN SATURATION: 98 % | BODY MASS INDEX: 37.19 KG/M2 | HEIGHT: 61 IN | HEART RATE: 90 BPM | TEMPERATURE: 98 F | SYSTOLIC BLOOD PRESSURE: 120 MMHG | RESPIRATION RATE: 18 BRPM | WEIGHT: 197 LBS

## 2024-01-31 DIAGNOSIS — E61.1 IRON DEFICIENCY: ICD-10-CM

## 2024-01-31 DIAGNOSIS — Z71.6 ENCOUNTER FOR SMOKING CESSATION COUNSELING: ICD-10-CM

## 2024-01-31 DIAGNOSIS — M35.9 UNDIFFERENTIATED CONNECTIVE TISSUE DISEASE (HCC): Primary | ICD-10-CM

## 2024-01-31 DIAGNOSIS — R53.82 CHRONIC FATIGUE: ICD-10-CM

## 2024-01-31 DIAGNOSIS — D84.821 IMMUNOCOMPROMISED STATE DUE TO DRUG THERAPY  (HCC): ICD-10-CM

## 2024-01-31 DIAGNOSIS — Z79.899 HIGH RISK MEDICATION USE: ICD-10-CM

## 2024-01-31 DIAGNOSIS — Z79.899 IMMUNOCOMPROMISED STATE DUE TO DRUG THERAPY  (HCC): ICD-10-CM

## 2024-01-31 DIAGNOSIS — H20.9 UVEITIS: ICD-10-CM

## 2024-01-31 DIAGNOSIS — M65.4 DE QUERVAIN'S TENOSYNOVITIS, LEFT: ICD-10-CM

## 2024-01-31 DIAGNOSIS — R70.0 ELEVATED SED RATE: ICD-10-CM

## 2024-01-31 PROCEDURE — 3074F SYST BP LT 130 MM HG: CPT | Performed by: INTERNAL MEDICINE

## 2024-01-31 PROCEDURE — 3078F DIAST BP <80 MM HG: CPT | Performed by: INTERNAL MEDICINE

## 2024-01-31 PROCEDURE — 99214 OFFICE O/P EST MOD 30 MIN: CPT | Performed by: INTERNAL MEDICINE

## 2024-01-31 PROCEDURE — 3008F BODY MASS INDEX DOCD: CPT | Performed by: INTERNAL MEDICINE

## 2024-01-31 RX ORDER — ADALIMUMAB 40MG/0.4ML
KIT SUBCUTANEOUS
COMMUNITY
Start: 2024-01-17

## 2024-01-31 NOTE — PROGRESS NOTES
?  RHEUMATOLOGY FOLLOW UP   Date of visit: 01/31/2024    Chief Complaint   Patient presents with    Undifferentiated Connective Tissue Disease     Here for 3 month follow up. States that both hands and feet have been hurting      ?  ASSESSMENT, DISCUSSION & PLAN   Assessment:  1. Undifferentiated connective tissue disease (HCC)    2. Uveitis    3. High risk medication use    4. Encounter for smoking cessation counseling    5. Immunocompromised state due to drug therapy  (HCC)    6. Chronic fatigue    7. Iron deficiency    8. Elevated sed rate    9. De Quervain's tenosynovitis, left        Discussion:  Ms. Amelie Gonzalez is a 43 y with a plethora of complaints.  It seems that she was previously diagnosed with potentially lupus given Smith antibody positivity, however her EVIN was normal, complements normal as well as inflammatory marker were normal.  Patient does have a history of livedo reticularis and possible Raynaud's.  She also complains of rashes, oral/nasal ulcerations, joint pain with swelling as well as multisystem complaints.  It seems like she was previously put on Plaquenil and had facial rash, however unclear if rash was connected to medication itself.  She lacks any overt signs of synovitis on her exam today.  Her labs were grossly negative for autoimmune etiology to her livedo and Raynaud's. She had positive RNA virginia III testing but EVIN and otherwise negative workup.  She was seen by rheumatology at - said unlikely scleroderma. Recommended hand US if swelling recurs. Knee xrays consistent with mild arthritis. Recommended ophthalmology exam and to have schirmer testing done, would consider salivary lip biopsy vs parotid US for eval for Sjogren's. Strongly recommended IBD evaluation     She has seen GI and colonoscopy was negative for any active colitis, endoscopy was consistent with gastritis.  She was given Xifaxan for IBS D which has improved her nausea.  Feels like her diarrhea has improved after the  last set of steroids that I ordered.  In terms of neurology, she did undergo spinal tap and was told that testing was negative for multiple sclerosis.  Unclear if there has been any etiology found to her small fiber neuropathy.    She was started on Humira since her last visit due to worsened iritis/uveitis. She has tolerated the first few injections with improvement of eye inflammation  Some inflammation over the right mid foot and signs of de quervain's on the left.   Will have her continue Humira q. 14 days  Encouraged her to continue methotrexate 7 tabs once weekly with daily folic acid  Strongly recommended smoking cessation.  Reminded that she will have to hold immunosuppression if any signs of infection.  Recommended that she wear the thumb splints for the tenosynovitis.  If not improving, can consider short course of steroids.    Follow-up in 3 to 4 months or sooner as needed  Labs prior to next visit.  Will add iron studies since previously borderline low  Encouraged to reach out if symptoms change or worsen in the meantime    Humira is an injectable TNF-alpha inhibitor, which is a humanized monoclonal biologic medication used in the treatment of uveitis. This is a potent immunosuppressant medication, and as such runs the risk of increased susceptibility to infections. Based on data analysis, this is most commonly respiratory infections, but can also include all types of infection, including reactivation of latent tuberculosis if present. As such, we recommend aggressive screening for tuberculosis as well as viral hepatitis. This medication also comes with a black box warning for the possibility of maligancy. It is unclear whether this is a true causal relationship, as many of the inflammatory diseases this agent is used in have an increased risk of malignancy as well.  As with any of the biologic medications, there is always risk of allergic reaction, including anaphylaxis. We discussed these risks, and it  was agreed that the benefits outweigh the possible risks of medication usage.     We previously discussed the risk and benefit of methotrexate therapy, including the possibility of liver toxicity. We will monitor her liver functions in every month for the first couple months followed by every three months thereafter, we also discussed that pulmonary hypersensitivity reactions may have been at times, including lung toxicities and she was educated on signs and symptoms such as fever, persistent cough, and shortness of breath.Due to potential lung toxicity with this medication, baseline CXR should be on file.  There is also a risk of depression of the blood cell count. There is an increased risk of infection with this medication as well, particularly respiratory infections, we discussed this at length. We also discussed the risk of GI upset and the development of oral ulcers. It was explained in detail that folic acid was needed daily to help avoid many of these side effects.  Methotrexate is also an abortifacient medication with severe teratogenicity. This was discussed in detail as a risk. Should pregnancy occur, this medication would be extremely toxic to the fetus.  While on methotrexate, sulfa based drugs including but not limited to Bactrim, should be avoided due to potential toxicity.     She previously tried plaquenil (tried 2 different instances) but had rash/allergic reaction both times.   She may benefit from a low dose gabapentin vs low dose prednisone as recommended by neuro. Will consider this after monitoring her response to getting inflammation under control     Patient verbalized understanding of above instructions. No further questions at this time.    Code selection for this visit was based on time spent (30min) on date of service in preparing to see the patient, obtaining and/or reviewing separately obtained history, performing a medically appropriate examination, counseling and educating the  patient/family/caregiver, ordering medications or testing, referring and communicating with other healthcare providers, documenting clinical information in the E HR, independently interpreting results and communicating results to the patient/family/caregiver and care coordination with the patient's other providers. In addition to multiple mychart messages prior to today's visit.   ?  Plan:  Diagnoses and all orders for this visit:    Undifferentiated connective tissue disease (HCC)  -     Iron And Tibc; Future  -     Ferritin; Future  -     CBC With Differential With Platelet; Future  -     Comp Metabolic Panel (14); Future  -     C-Reactive Protein; Future  -     Sed Rate, Westergren (Automated); Future    Uveitis  -     Iron And Tibc; Future  -     Ferritin; Future  -     CBC With Differential With Platelet; Future  -     Comp Metabolic Panel (14); Future  -     C-Reactive Protein; Future  -     Sed Rate, Westergren (Automated); Future    High risk medication use  -     Iron And Tibc; Future  -     Ferritin; Future  -     CBC With Differential With Platelet; Future  -     Comp Metabolic Panel (14); Future  -     C-Reactive Protein; Future  -     Sed Rate, Westergren (Automated); Future    Encounter for smoking cessation counseling    Immunocompromised state due to drug therapy  (HCC)    Chronic fatigue  -     Iron And Tibc; Future  -     Ferritin; Future    Iron deficiency  -     Iron And Tibc; Future  -     Ferritin; Future    Elevated sed rate  -     C-Reactive Protein; Future  -     Sed Rate, Westergren (Automated); Future    De Quervain's tenosynovitis, left              Return in about 3 months (around 4/30/2024).  ?  HPI   Amelie Gonzalez is a 43 year old female with the following active problems who was seen as a new patient for evaluation of pain and fatigue.  She presents for follow up today.     Since her last visit, she has been doing okay.  Was able to get Humira approved and started. Had first  injection in December and has had three injections. Tolerated so far. Gets some fatigue about an hour after the injection. Improved with napping. Denies injection site reactions.   Iritis symptoms have improved and most recent eye exam showed no active cell.   Also states prior rash under her breast has resolved by 70% (previously tried various topicals steroids, antifungal and hibiclens without relief)    Feels her joint pain is about the same. Located in hands and feet over the top. Intermittent knee pain. Swelling over the hands.     Admits she hasn't been able to be compliant with methotrexate due to covid and kids with flu and sinus infection.     Some increased constipation. Denies blood in the stools.   Denies obvious lymph nodes but has some soreness  Some lightheaded, teeth and face hurt. Does use flonase. Not currently taking antihistamines but does in summer/spring.     + dry eyes and has some keratitis, using allergy drops. Plans to try restasis again. Has allergy to plugs so avoids. Off steroid eye drops since December.       Prior rib pain ?intercostal/costochondritis has been better. Previously following with chiro, not currently  Some worsened low back pain about a week ago.   Still with significant reflux/GERD- has switched various PPIs and thinks due to switch again.     + intermittent palpitations   Mild dry mouth intermittent  No nasal ulcers  Some recent oral ulcers but not as severe as previously   Denies recent livedo reticularis   + some chest pain and shortness of breath - did have treadmill stress test last year which was normal. Holter showed PVCs.     Still smoking- interested in stopping; but can smoke up to 1ppd.       Reminds me, she has been seen by [facility] for second opinion to be evaluated for scleroderma- was told unlikely scleroderma but recommended that she be monitored for lupus and should get further work up for IBD.       HPI from initial  consultation  referred for rheumatologic evaluation due to second opinion.      States she first started to have problems about 2018. Thought she was getting lymph nodes enlarged in the neck. A rash on the forehead that looked like blisters. Both resolved on it's own.   Then developed livedo reticularis appearance over the legs.   Was seen by rheumatology and initially labs were normal so no intervention started initially.    Has been suffering from GI issues- abdominal pain/cramping, diarrhea without blood in stools, nausea without vomiting. States she has been told colitis in the past. Has been chronic over the past few years.     Also rash under her breasts that lasted several months. Eventually went away on it's own.     After covid vaccination in Feb (2021), had worsened pain in her face. Has had repeat oral ulcerations. Has been seen by ENT and dentist. Had biopsy and told possible lichen planus. Has tried different ointments as well as mouth washes without improvement.     Had worsened hand swelling. When she followed with rheumatology again, her Queen antibody was positive but EVIN negative. She was started on plaquenil, and unclear if face rash started due to medication since rash still present intermittently. Happens randomly per pt, not necessarily with sun exposure.     Had worsened abdominal pain and dx with liver lesion, underwent surgical removal on 10/11. Does have improvement of the abdominal pain. Diagnosed with Von Meyenburg complex    + hx of sepsis from renal stones (2014)  + hair loss/thinning  + can get ulcer in the nose   + pain located in wrists b/l (was wearing braces in August), b/l knees. Can get swelling in the hand and knees. Swelling diffuse over the hands, not just over the joints.   + hx of one miscarriage at 13w, able to conceive after that. No other pregnancy complications.   + GERD on medication, controlled   + trouble swallowing, had swallow study done last year and full  esophagram was normal   + dry eyes, uses artifical tears and gel at night, has tried plugs before but had allergic reaction to the material.  + dry mouth, not severe. No recurrent cavities.   + achilles pain with sometimes swelling, particularly when first getting up in the mornings   + abnormal fingernails- ridging and peeling   + possible hx of dactylitis about 2019- left ring finger; no recurrence since that time but has residual itching.   + chronic sinus disease; epistaxis in the arcos  + chronic cough  + smoker since age 15; has wellbutrin to try to help quit    The patient denies elevated or scarring rashes, Raynaud's phenomenon, prior hematologic abnormality, prior renal disease (aside from the kidney stones), or history of seizures.  No history of prior blood clot in the legs or lungs, strokes or ischemic phenomenon.  Denies nonhealing ulcers on the fingertips.   The patient denies any history of uveitis, nodular painful shin bruises, psoriatic lesions, or history of dactylitis.    Works as opto technician.     Family hx:   Father with psoriatic arthritis on methotrexate and prednisone  Twin sister (identical) getting worked up for psoriatic arthritis vs mctd, has jo1+      Past Medical History:  Past Medical History:   Diagnosis Date    Abdominal distention     Abdominal hernia     ABDOMINAL PAIN     mild cramping    Abdominal pain     Anxiety     Arthritis     Atypical mole     Back pain     Bad breath     Belching     Benign neoplasm of rectum 3/1/2023    Bloating     Blood in the stool     Calculus of kidney     Cardiomegaly 9/8/2014    CERVICAL DYSPLASIA     Change in hair     Chest pain     Constipation     Decorative tattoo     DEPRESSION     Diabetes mellitus (HCC)     gestational    Diarrhea, unspecified     Endometriosis     Enlarged lymph node     Fatigue     Flatulence/gas pain/belching     Food intolerance     Frequent urination     Frequent UTI     GERD     GERD (gastroesophageal reflux  disease)     Gestational diabetes          Headache disorder     Hearing loss     Heartburn     Hemorrhoids     History of depression     IBS (irritable bowel syndrome)     Indigestion     Irregular bowel habits     Kidney disease     Leaking of urine     Migraines     Mouth sores     Nausea     Night sweats     OBESITY     Pain in joints     Pain with bowel movements     Painful swallowing     PONV (postoperative nausea and vomiting)     Problems with swallowing     Rash     Skin blushing/flushing     Sleep disturbance     Stool incontinence     Thyroid nodule 2016    Ulcer      Past Surgical History:  Past Surgical History:   Procedure Laterality Date       and     CHOLECYSTECTOMY      COLONOSCOPY      COLPOSCOPY,BX CERVIX/ENDOCERV CURR  2003    moderate dysplasia    CYSTO/URETERO W/LITHOTRIPSY Right 2014    Procedure: LITHOTRIPSY HOLMIUM LASER WITH CYSTOSCOPY;  Surgeon: Thierno Tovar DO;  Location: [facility]    CYSTOURETRO W/STONE REMOVE Right 2014    Procedure: CYSTOSCOPY WITH RETROGRADE PYELOGRAM;  Surgeon: Thierno Tovar DO;  Location: [facility]    D & C  2012    EGD      FRAGMENTING OF KIDNEY STONE Right 2014    Procedure: LITHOTRIPSY WITH CYSTOSCOPY, STENT PLACEMENT;  Surgeon: Thierno Tovar DO;  Location: [facility]    HYSTERECTOMY  10/2022    LEEP  2003    LYSIS OF ADHESIONS      PRECIOUS LOCALIZATION WIRE 1 SITE RIGHT (CPT=19281)      age 26 had Right biopsyexciosnal; fibrocystic tissue    NEEDLE BIOPSY LIVER      OTHER SURGICAL HISTORY      Rt breast biopsy    OTHER SURGICAL HISTORY  2014    Right ESWL- Dr. Webb    OTHER SURGICAL HISTORY  2014    Right ESWL- Dr. Tovar    OTHER SURGICAL HISTORY  2014    Cysto Stent Removal- Dr. Tovar    TONSILLECTOMY  1990    TOTAL ABDOM HYSTERECTOMY      X-RAY RETROGRADE PYELOGRAM Right 2014    Procedure:  CYSTOSCOPY/URETEROSCOPY/STONE EXTRACTION;  Surgeon: Thierno Tovar DO;  [place] [facility] [place] [place] [facility], [facility]     Family History:  Family History   Problem Relation Age of Onset    Other (psoriatic arthritis) Father     Colon Polyps Mother     Lipids Mother     Heart Disorder Mother     Hypertension Mother     Diabetes Mother     Breast Cancer Mother 70    Cancer Mother     Colon Polyps Sister     Other (psoriatic arthritis) Sister     Other (mctd) Sister     Ovarian Cancer Paternal Aunt     Colon Polyps Sister      Social History:  Social History     Socioeconomic History    Marital status:    Tobacco Use    Smoking status: Every Day     Packs/day: 1.00     Years: 15.00     Additional pack years: 0.00     Total pack years: 15.00     Types: Cigarettes    Smokeless tobacco: Never   Vaping Use    Vaping Use: Never used   Substance and Sexual Activity    Alcohol use: Not Currently    Drug use: No    Sexual activity: Yes     Partners: Male     Medications:  Outpatient Medications Marked as Taking for the 1/31/24 encounter (Office Visit) with Maggie Sy DO   Medication Sig Dispense Refill    HUMIRA, 2 PEN, 40 MG/0.4ML Subcutaneous Pen-injector Kit every 14 (fourteen) days.      folic acid 1 MG Oral Tab Take 1 tablet (1 mg total) by mouth daily. 90 tablet 0    methotrexate 2.5 MG Oral Tab Take 7 tablets (17.5 mg total) by mouth once a week. 84 tablet 0    prednisoLONE 1 % Ophthalmic Suspension Place 1 drop into both eyes 4 (four) times daily.      hydrocortisone 2.5 % External Ointment apply 1 application topically 2(two) times a day to affected areas on chest for 1-2 weeks      cyclobenzaprine 5 MG Oral Tab Take 1 tablet (5 mg total) by mouth nightly as needed for Muscle spasms. 30 tablet 0    desvenlafaxine ER 50 MG Oral Tablet 24 Hr Take 1 tablet (50 mg total) by mouth daily.      olopatadine 0.1 % Ophthalmic Solution INSTILL 1 DROP BY OPHTHALMIC ROUTE 2 TIMES EVERY DAY INTO BOTH EYES AS NEEDED       hyoscyamine 0.125 MG Sublingual SL Tab Place 1 tablet (125 mcg total) under the tongue every 6 (six) hours as needed for Cramping. 120 tablet 0    pantoprazole 40 MG Oral Tab EC Take one tablet (40 mg total) by mouth once daily, 30 minutes prior to breakfast. 30 tablet 5    Albuterol Sulfate  (90 Base) MCG/ACT Inhalation Aero Soln Inhale 2 puffs into the lungs every 6 (six) hours as needed.      LOPERAMIDE HCL OR Take by mouth as needed.       Modified Medications    No medications on file     Medications Discontinued During This Encounter   Medication Reason    methylPREDNISolone (MEDROL) 4 MG Oral Tablet Therapy Pack Therapy completed     ?  ?  Allergies:  Allergies   Allergen Reactions    Aluminum RASH and OTHER (SEE COMMENTS)     Ulcers on skin    Azithromycin ANGIOEDEMA and SWELLING    Biaxin [Clarithromycin] TONGUE SWELLING    Clarithromycin ANGIOEDEMA, SWELLING and TONGUE SWELLING     Tongue swells and develops sores in mouth  Tongue swells    Tongue swells and develops sores in mouth    Duloxetine MYALGIA and OTHER (SEE COMMENTS)     Vomiting and jaw lock    Vomiting and jaw lock  Other reaction(s): Myalgias (muscle pain)  Vomiting and jaw lock    Erythromycin ANGIOEDEMA and SWELLING    Nickel OTHER (SEE COMMENTS) and RASH     Skin ulcer.    Skin ulcer.  Skin ulcer.    Nickel RASH     Also reaction to aluminum- enviornmental      Silver UNKNOWN    Hydroxychloroquine RASH     ?  REVIEW OF SYSTEMS   ?  Review of Systems   Constitutional:  Positive for malaise/fatigue. Negative for chills, fever and weight loss.   HENT:  Positive for sinus pain.    Eyes:  Positive for blurred vision, double vision and photophobia. Negative for pain and redness.   Respiratory:  Positive for cough and shortness of breath. Negative for hemoptysis.    Cardiovascular:  Positive for chest pain and palpitations. Negative for leg swelling.   Gastrointestinal:  Positive for constipation and heartburn. Negative for abdominal pain,  blood in stool, diarrhea, nausea and vomiting.   Genitourinary:  Positive for urgency. Negative for dysuria, frequency and hematuria.   Musculoskeletal:  Positive for back pain, joint pain (but some hand swelling) and neck pain. Negative for myalgias.   Skin:  Negative for itching and rash.   Neurological:  Positive for dizziness (intermittently) and headaches (intermittently). Negative for tingling and weakness.   Endo/Heme/Allergies:  Positive for environmental allergies. Bruises/bleeds easily.   Psychiatric/Behavioral:  Positive for depression. The patient is nervous/anxious and has insomnia.      PHYSICAL EXAM   Today's Vitals:  Temperature Blood Pressure Heart Rate Resp Rate SpO2   Temp: 97.8 °F (36.6 °C) BP: 120/72 Pulse: 90 Resp: 18 SpO2: 98 %   ?  Current Weight Height BMI BSA Pain   Wt Readings from Last 1 Encounters:   01/31/24 197 lb (89.4 kg)    Height: 5' 1\" (154.9 cm) Body mass index is 37.22 kg/m². Body surface area is 1.88 meters squared.         Physical Exam  Vitals and nursing note reviewed.   Constitutional:       General: She is not in acute distress.     Appearance: Normal appearance. She is well-developed. She is not diaphoretic.   HENT:      Head: Normocephalic.   Eyes:      General: No scleral icterus.     Extraocular Movements: Extraocular movements intact.      Conjunctiva/sclera: Conjunctivae normal.   Neck:      Vascular: No JVD.      Trachea: No tracheal deviation.   Cardiovascular:      Rate and Rhythm: Normal rate and regular rhythm.      Heart sounds: No murmur heard.  Pulmonary:      Effort: Pulmonary effort is normal. No respiratory distress.      Breath sounds: Normal breath sounds. No wheezing.   Musculoskeletal:         General: Swelling and tenderness present.      Cervical back: Neck supple.      Comments: Diffuse tenderness- particularly over Mcps and dorsum of hands and b/l wrist - improved overall  No swelling, redness or restriction of motion of the DIPs, PIPs, MCPs,  wrists, elbows, ankles, or joints of the feet except right mid foot  Post surgical changes noted over distal right foot  +finklesteins' on left   Puffiness over index/middle fingers MCPs to PIPs but not over joints.   Bilateral shoulders with full ROM, no evidence of impingement with provocative maneuvers.  Bilateral knees with medial joint line tenderness, no crepitus, no effusion.  Medial distal knee tenderness over pes anserine    Lymphadenopathy:      Cervical: No cervical adenopathy.   Skin:     General: Skin is warm and dry.      Findings: No erythema or rash.      Comments: No periungal erythema   No skin tightening noted over fingers/hands  No malar rash  Livedo reticularis    Neurological:      Mental Status: She is alert and oriented to person, place, and time.      Cranial Nerves: No cranial nerve deficit.      Gait: Gait normal.   Psychiatric:         Mood and Affect: Mood normal.         Behavior: Behavior normal.       ?  Radiology review:       MRI's reviewed and to be scanned into chart.       PROCEDURE: CT ABDOMEN TRIPHASIC LIVER (W+WO)(CPT=74170)       COMPARISON: [facility], CT APPENDIX ABD PEL W CONTRAST (YAB=70476), 9/10/2021, 1:56 PM.  [facility], MRI LIVER (W+WO) (CPT=74183), 8/25/2021, 12:47 PM.       INDICATIONS: History of cystic liver lesion, status post surgical resection of segment VIII (10/11/2021).       TECHNIQUE: Multidetector CT images of the abdomen were obtained without and with non-ionic intravenous contrast material (in the arterial and portal venous phases). Automated exposure control for dose reduction was used. Adjustment of the mA and/or kV   was done based on the patient's size. Iterative reconstruction technique for dose reduction was employed. Water was ingested immediately prior to image acquisition; no oral contrast was ingested.       FINDINGS:   LUNG BASES: The heart is normal in size. There is dependent subsegmental  atelectasis bilaterally.   LIVER: Postoperative changes are suggested with demonstration of a 2.7 x 1.9 x 3.6 cm residual hypodensity in segment VIII, intervally decreased from 7.6 x 6.9 x 6.5 cm previously; this has lobulated contours. Overlying capsular retraction is seen   extending to the right hemidiaphragmatic leaflet. No definite enhancement is identified.   Additional well-circumscribed cystic lesions are present, not significantly changed from prior exams.   BILIARY: The gallbladder is surgically absent with cholecystectomy clips in the gallbladder fossa. Extrahepatic biliary dilatation measures up to 0.8 cm.   PANCREAS: No lesion, fluid collection, ductal dilatation, or atrophy.     SPLEEN: No enlargement.     ADRENALS:   No defined mass or abnormal enlargement.     KIDNEYS:   Bilateral renal calculi are apparent. No ureteral calculi are identified in the imaged volume. There is no hydronephrosis or hydroureter. No perinephric or periureteric fat stranding is appreciated. Lobulated renal contours are seen,   suggestive of multifocal renal cortical scarring. Multiple well circumscribed renal hypodensities are present and are not completely characterized, but statistically likely represent cysts. No suspicious enhancing renal lesions are evident. The   visualized portions of the ureters are normal in course and caliber without visualized filling defects on excretory phase imaging.   GI/MESENTERY: A small hiatal hernia is evident. Mild mucosal enhancement of the terminal ileum is suggested. There is no evidence of bowel obstruction. A normal caliber appendix is seen without inflammatory manifestations.     VASCULATURE:   No aneurysm is detected.   RETROPERITONEUM: No mass or lymphadenopathy is apparent.     BONES:   Multilevel degenerative changes are seen throughout the spine.   ABDOMINAL WALL: There is a minute fat-containing umbilical hernia superimposed on slight periumbilical diastasis.   OTHER: No free  air or fluid is seen in the abdomen.                         Impression   CONCLUSION:   1. Postprocedural changes of partial cyst resection in segment VIII. The previously seen cystic lesion has partially involuted. No suspicious features are identified.       2. Additional scattered cystic hepatic lesions appear grossly stable.       3. Nonspecific mild mucosal enhancement of the terminal ileum, perhaps indicative of underlying ileitis. Correlation for relevant symptomatology is suggested.       4. Bilateral renal calculi are seen without evidence of obstructive uropathy.       5. Renal cortical defects, particular on the right, may reflect renal cortical scarring.       6. Status post cholecystectomy with mild extrahepatic biliary dilatation, perhaps related to the postcholecystectomy state.       7. Lesser incidental findings as above.         Dictated by (CST): Reinaldo Wilkes MD on 12/01/2021 at 2:44 PM       Finalized by (CST): Reinaldo Wilkes MD on 12/01/2021 at 2:56 PM             PROCEDURE: MRI LIVER (W+WO) (CPT=74183)       COMPARISON: None.       INDICATIONS: Liver cyst. Follow up.       TECHNIQUE: A comprehensive MRI examination of the abdomen was performed utilizing a variety of imaging planes and imaging parameters to optimize visualization of suspected pathology.  Images were obtained both before and after intravenous gadolinium   injection.         FINDINGS:   LIVER: There is a lobulated T2 hyperintense lesion which appears to be centered in the hepatic dome segment 8 measuring 7.0 x 7.6 x 5.8 cm. This may have some thin internal septation but otherwise no evidence of significant enhancement.  There are   multiple additional scattered smaller cysts seen throughout the liver.  There is a 0.7 cm T2 hyperintense lesion in the left lobe segment 2. This demonstrates homogeneous arterial enhancement which persists on delayed sequences and most likely reflects a    small flash filling meningioma.    GALLBLADDER: Gallbladder surgically absent.   BILIARY TREE: Common bile duct measures 0.8 cm with distal gradual tapering.  No intrahepatic biliary ductal dilatation.  There is low insertion of a cystic duct remanent noted which is not significantly dilated.   PANCREAS: Unremarkable.   SPLEEN: Unremarkable.   ADRENALS: Unremarkable.   KIDNEYS: Enhance symmetrically without hydronephrosis.  Cortical scarring noted in the interpolar right kidney.  A subcentimeter right lower pole renal cyst noted.   VASCULATURE:             Abdominal aorta is normal in caliber.  The main portal vein is patent.   LYMPHADENOPATHY: None.     BOWEL: Visualized bowel is nondilated.   BONES: Suboptimally evaluated but marrow signal is grossly unremarkable.   LUNG BASES: No significant finding.   OTHER: Negative.                     Impression   CONCLUSION:       7.6 cm cyst hepatic dome as described above with additional smaller scattered hepatic cysts.       Probable subcentimeter left hepatic flash filling hemangioma.       Cholecystectomy.       Mildly dilated common bile duct with distal gradual tapering.  Findings may reflect post cholecystectomy change.  Advise appropriate laboratory correlation.       Small right renal cyst.               Dictated by (CST): Manny Sanchez MD on 8/25/2021 at 1:50 PM       Finalized by (CST): Manny Sanchez MD on 8/25/2021 at 2:12 PM        HISTORY: Persistent cough for 2 months.  Occasional shortness of breath.     TECHNIQUE:  2 view chest.     COMPARISON: 9/6/2014.     FINDINGS/   IMPRESSION:   Equivocal minor peribronchial thickening.  No discrete focal airspace consolidation or pleural effusion.     No overt enlargement of the cardiac silhouette.     FINAL REPORT   Attending Radiologist:  Maverick Irving MD   Date Signed Off:  12/26/2020 16:18          Labs:  Lab Results   Component Value Date    WBC 7.7 01/24/2024    RBC 4.70 01/24/2024    HGB 14.5 01/24/2024    HCT 42.0 01/24/2024    PLT  325.0 01/24/2024    MCV 89.4 01/24/2024    MCH 30.9 01/24/2024    MCHC 34.5 01/24/2024    RDW 13.6 01/24/2024    NEPRELIM 3.76 01/24/2024    NEPERCENT 49.1 01/24/2024    LYPERCENT 39.0 01/24/2024    MOPERCENT 9.4 01/24/2024    EOPERCENT 1.2 01/24/2024    BAPERCENT 1.2 01/24/2024    NE 3.76 01/24/2024    LYMABS 2.98 01/24/2024    MOABSO 0.72 01/24/2024    EOABSO 0.09 01/24/2024    BAABSO 0.09 01/24/2024     Lab Results   Component Value Date    GLU 73 01/24/2024    BUN 12 01/24/2024    BUNCREA 14.8 01/24/2024    CREATSERUM 0.81 01/24/2024    ANIONGAP 7 01/24/2024    GFR >59 12/04/2010    GFRNAA 93 08/01/2022    GFRAA 108 08/01/2022    CA 9.1 01/24/2024    OSMOCALC 288 01/24/2024    ALKPHO 75 01/24/2024    AST 21 01/24/2024    ALT 24 01/24/2024    BILT 0.2 (L) 01/24/2024    TP 7.2 01/24/2024    ALB 4.6 01/24/2024    GLOBULIN 2.6 (L) 01/24/2024    AGRATIO 1.8 12/04/2010     01/24/2024    K 3.8 01/24/2024     01/24/2024    CO2 24.0 01/24/2024       Additional Labs:  01/2024  CRP normal  ESR 21 borderline  CMP grossly normal  CBC grossly normal    11/2023  HLA-B27 negative  ESR 39 elevated  CRP 0.5 normal  CMP grossly normal  CBC grossly normal  HCV nonreactive  Hep B core antibody, IgM, total nonreactive  Hepatitis surface antibody reactive  QuantiFERON-TB negative    08/2023  CBC with WBC 3.4 borderline low  CRP normal  ESR 37 elevated    09/2023  CRP 0.42 borderline   ESR 20 normal     08/2023  CRP normal  ESR 25 elevated  B12 448 normal     07/2023  ESR 33 borderline  CRP 0.32 borderline  CBC grossly normal  CMP grossly normal    06/2023  ESR 29 borderline  CRP 0.32 borderline  CBC grossly normal  CMP grossly normal  Vit D 24.7 low    04/2023  ESR 44 elevated  CRP 0.82 elevated    01/2023  IgA normal  U1 RNP negative  RNP 70 negative  ESR 16 normal  CRP 0.34 borderline  CMP grossly normal  CBC grossly normal  Vit D 25.8 low    11/11/2022  ESR 31 elevated  CRP 0.69 elevated  CMP grossly normal  CBC grossly  normal    08/2022  Vit D 24.4 low  ESR 13  CRP 0.59 borderline   RNA polymerase III 59 moderate positive  EVIN by IFA negative    04/2022  EVIN/EVIN panel negative  ESR 16 normal  CRP 0.43 borderline  RNA polymerase III 64 moderate positive  EVIN by IFA negative    02/2022  EVIN by IFA  CRISTELA Panel negative  CRP 0.54 elevated   ESR 16 normal   RNA virginia III 60 moderate positive     10/2021  ANCA negative (MPO/PR3) negative  ESR 15 normal   CRP 0.31  Cryoglobulin negative   SPEP grossly negative   CBC grossly normal   CMP grossly normal   IgA, IgG, IgM normal   Total complement normal   B12 525 normal  AVISE grossly negative with exception of equivocal RNA virginia III    04/2021  CK normal  C3 152 normal  C4 24.1 normal  dsDNA negative  SSA SSB negative  Smith moderate positive  Queen/RNP moderate positive  EVIN by IFA negative    08/2020  Vitamin D 32.5  B12 384  ESR 7 normal  CRP 0.37 borderline    04/2019  B2 G negative  RF negative  CCP negative  EBV IgG, AG ABS positive; IgA negative  MPO, AK-3, ANCA negative  SPEP grossly normal  Cryoglobulin negative  LAC negative  ACL negative  HIV negative  HCV nonreactive  Hepatitis B serologies normal    Maggie Yossi, DO  [facility]  01/31/2024

## 2024-03-04 ENCOUNTER — PATIENT MESSAGE (OUTPATIENT)
Dept: RHEUMATOLOGY | Facility: CLINIC | Age: 44
End: 2024-03-04

## 2024-03-04 DIAGNOSIS — M35.9 UNDIFFERENTIATED CONNECTIVE TISSUE DISEASE (HCC): Primary | ICD-10-CM

## 2024-03-04 DIAGNOSIS — M25.50 POLYARTHRALGIA: ICD-10-CM

## 2024-03-04 RX ORDER — METHYLPREDNISOLONE 4 MG/1
TABLET ORAL
Qty: 1 EACH | Refills: 0 | Status: SHIPPED | OUTPATIENT
Start: 2024-03-04

## 2024-04-22 DIAGNOSIS — M35.9 UNDIFFERENTIATED CONNECTIVE TISSUE DISEASE (HCC): ICD-10-CM

## 2024-04-22 DIAGNOSIS — M25.50 POLYARTHRALGIA: ICD-10-CM

## 2024-04-22 RX ORDER — METHOTREXATE 2.5 MG/1
17.5 TABLET ORAL WEEKLY
Qty: 84 TABLET | Refills: 0 | Status: SHIPPED | OUTPATIENT
Start: 2024-04-22

## 2024-04-22 NOTE — TELEPHONE ENCOUNTER
LOV: 1/31/2024    RTC: 3-4 months   Future Appointments   Date Time Provider Department Center   5/8/2024 10:45 AM YossiMaggie duncan, DO [facility]   9/4/2024  9:30 AM Yossi Maggie, DO [facility]   11/27/2024  9:30 AM YossiMaggie barr, DO [facility]   LABS:   Component      Latest Ref Rng 1/24/2024   WBC      4.0 - 11.0 x10(3) uL 7.7    RBC      3.80 - 5.30 x10(6)uL 4.70    Hemoglobin      12.0 - 16.0 g/dL 14.5    Hematocrit      35.0 - 48.0 % 42.0    MCV      80.0 - 100.0 fL 89.4    MCH      26.0 - 34.0 pg 30.9    MCHC      31.0 - 37.0 g/dL 34.5    RDW-SD      35.1 - 46.3 fL 44.6    RDW      11.0 - 15.0 % 13.6    Platelet Count      150.0 - 450.0 10(3)uL 325.0    Prelim Neutrophil Abs      1.50 - 7.70 x10 (3) uL 3.76    Neutrophils Absolute      1.50 - 7.70 x10(3) uL 3.76    Lymphocytes Absolute      1.00 - 4.00 x10(3) uL 2.98    Monocytes Absolute      0.10 - 1.00 x10(3) uL 0.72    Eosinophils Absolute      0.00 - 0.70 x10(3) uL 0.09    Basophils Absolute      0.00 - 0.20 x10(3) uL 0.09    Immature Granulocyte Absolute      0.00 - 1.00 x10(3) uL 0.01    Neutrophils %      % 49.1    Lymphocytes %      % 39.0    Monocytes %      % 9.4    Eosinophils %      % 1.2    Basophils %      % 1.2    Immature Granulocyte %      % 0.1    Glucose      70 - 99 mg/dL 73    Sodium      136 - 145 mmol/L 140    Potassium      3.5 - 5.1 mmol/L 3.8    Chloride      98 - 112 mmol/L 109    Carbon Dioxide, Total      21.0 - 32.0 mmol/L 24.0    ANION GAP      0 - 18 mmol/L 7    BUN      9 - 23 mg/dL 12    CREATININE      0.55 - 1.02 mg/dL 0.81    BUN/CREATININE RATIO      10.0 - 20.0  14.8    CALCIUM      8.7 - 10.4 mg/dL 9.1    CALCULATED OSMOLALITY      275 - 295 mOsm/kg 288    EGFR      >=60 mL/min/1.73m2 93    ALT (SGPT)      10 - 49 U/L 24    AST (SGOT)      <=34 U/L 21    ALKALINE PHOSPHATASE      37 - 98 U/L 75    Total Bilirubin      0.3 - 1.2 mg/dL 0.2 (L)    PROTEIN, TOTAL      5.7 - 8.2 g/dL 7.2     Albumin      3.2 - 4.8 g/dL 4.6    Globulin      2.8 - 4.4 g/dL 2.6 (L)    A/G Ratio      1.0 - 2.0  1.8    Patient Fasting for CMP? No    SED RATE      0 - 20 mm/Hr 21 (H)    C-REACTIVE PROTEIN      <1.00 mg/dL <0.40       Legend:  (L) Low  (H) High    LAST REFILL: 1/22/2024, Quantity 84 tablets, Refills 0    Dr Sy-- orders pending, approve if agreeable.

## 2024-04-22 NOTE — TELEPHONE ENCOUNTER
MCM sent as a reminder for pt to complete all outstanding labs prior to pt appt with provider on 5/8/2024. Signing addendum.

## 2024-04-24 ENCOUNTER — LAB ENCOUNTER (OUTPATIENT)
Dept: LAB | Facility: REFERENCE LAB | Age: 44
End: 2024-04-24
Attending: INTERNAL MEDICINE
Payer: COMMERCIAL

## 2024-04-24 DIAGNOSIS — H20.9 UVEITIS: ICD-10-CM

## 2024-04-24 DIAGNOSIS — R70.0 ELEVATED SED RATE: ICD-10-CM

## 2024-04-24 DIAGNOSIS — Z79.899 HIGH RISK MEDICATION USE: ICD-10-CM

## 2024-04-24 DIAGNOSIS — M35.9 UNDIFFERENTIATED CONNECTIVE TISSUE DISEASE (HCC): ICD-10-CM

## 2024-04-24 DIAGNOSIS — E61.1 IRON DEFICIENCY: ICD-10-CM

## 2024-04-24 DIAGNOSIS — R53.82 CHRONIC FATIGUE: ICD-10-CM

## 2024-04-24 LAB
ALBUMIN SERPL-MCNC: 4.4 G/DL (ref 3.2–4.8)
ALBUMIN/GLOB SERPL: 1.7 {RATIO} (ref 1–2)
ALP LIVER SERPL-CCNC: 76 U/L
ALT SERPL-CCNC: 32 U/L
ANION GAP SERPL CALC-SCNC: 5 MMOL/L (ref 0–18)
AST SERPL-CCNC: 22 U/L (ref ?–34)
BASOPHILS # BLD AUTO: 0.03 X10(3) UL (ref 0–0.2)
BASOPHILS NFR BLD AUTO: 0.5 %
BILIRUB SERPL-MCNC: 0.3 MG/DL (ref 0.3–1.2)
BUN BLD-MCNC: 14 MG/DL (ref 9–23)
BUN/CREAT SERPL: 19.4 (ref 10–20)
CALCIUM BLD-MCNC: 9.4 MG/DL (ref 8.7–10.4)
CHLORIDE SERPL-SCNC: 110 MMOL/L (ref 98–112)
CO2 SERPL-SCNC: 25 MMOL/L (ref 21–32)
CREAT BLD-MCNC: 0.72 MG/DL
CRP SERPL-MCNC: <0.4 MG/DL (ref ?–1)
DEPRECATED HBV CORE AB SER IA-ACNC: 49.9 NG/ML
DEPRECATED RDW RBC AUTO: 44.3 FL (ref 35.1–46.3)
EGFRCR SERPLBLD CKD-EPI 2021: 106 ML/MIN/1.73M2 (ref 60–?)
EOSINOPHIL # BLD AUTO: 0.08 X10(3) UL (ref 0–0.7)
EOSINOPHIL NFR BLD AUTO: 1.4 %
ERYTHROCYTE [DISTWIDTH] IN BLOOD BY AUTOMATED COUNT: 13.5 % (ref 11–15)
ERYTHROCYTE [SEDIMENTATION RATE] IN BLOOD: 24 MM/HR
FASTING STATUS PATIENT QL REPORTED: NO
GLOBULIN PLAS-MCNC: 2.6 G/DL (ref 2.8–4.4)
GLUCOSE BLD-MCNC: 118 MG/DL (ref 70–99)
HCT VFR BLD AUTO: 42.6 %
HGB BLD-MCNC: 14.7 G/DL
IMM GRANULOCYTES # BLD AUTO: 0.01 X10(3) UL (ref 0–1)
IMM GRANULOCYTES NFR BLD: 0.2 %
IRON SATN MFR SERPL: 32 %
IRON SERPL-MCNC: 122 UG/DL
LYMPHOCYTES # BLD AUTO: 2.42 X10(3) UL (ref 1–4)
LYMPHOCYTES NFR BLD AUTO: 41.6 %
MCH RBC QN AUTO: 30.9 PG (ref 26–34)
MCHC RBC AUTO-ENTMCNC: 34.5 G/DL (ref 31–37)
MCV RBC AUTO: 89.7 FL
MONOCYTES # BLD AUTO: 0.45 X10(3) UL (ref 0.1–1)
MONOCYTES NFR BLD AUTO: 7.7 %
NEUTROPHILS # BLD AUTO: 2.83 X10 (3) UL (ref 1.5–7.7)
NEUTROPHILS # BLD AUTO: 2.83 X10(3) UL (ref 1.5–7.7)
NEUTROPHILS NFR BLD AUTO: 48.6 %
OSMOLALITY SERPL CALC.SUM OF ELEC: 292 MOSM/KG (ref 275–295)
PLATELET # BLD AUTO: 317 10(3)UL (ref 150–450)
POTASSIUM SERPL-SCNC: 3.7 MMOL/L (ref 3.5–5.1)
PROT SERPL-MCNC: 7 G/DL (ref 5.7–8.2)
RBC # BLD AUTO: 4.75 X10(6)UL
SODIUM SERPL-SCNC: 140 MMOL/L (ref 136–145)
TIBC SERPL-MCNC: 381 UG/DL (ref 250–425)
TRANSFERRIN SERPL-MCNC: 256 MG/DL (ref 250–380)
WBC # BLD AUTO: 5.8 X10(3) UL (ref 4–11)

## 2024-04-24 PROCEDURE — 80053 COMPREHEN METABOLIC PANEL: CPT | Performed by: INTERNAL MEDICINE

## 2024-04-24 PROCEDURE — 85025 COMPLETE CBC W/AUTO DIFF WBC: CPT | Performed by: INTERNAL MEDICINE

## 2024-04-24 PROCEDURE — 84466 ASSAY OF TRANSFERRIN: CPT | Performed by: INTERNAL MEDICINE

## 2024-04-24 PROCEDURE — 83540 ASSAY OF IRON: CPT | Performed by: INTERNAL MEDICINE

## 2024-04-24 PROCEDURE — 82728 ASSAY OF FERRITIN: CPT | Performed by: INTERNAL MEDICINE

## 2024-04-24 PROCEDURE — 86140 C-REACTIVE PROTEIN: CPT | Performed by: INTERNAL MEDICINE

## 2024-04-24 PROCEDURE — 85652 RBC SED RATE AUTOMATED: CPT | Performed by: INTERNAL MEDICINE

## 2024-04-28 DIAGNOSIS — M35.9 UNDIFFERENTIATED CONNECTIVE TISSUE DISEASE (HCC): ICD-10-CM

## 2024-04-28 DIAGNOSIS — M25.50 POLYARTHRALGIA: ICD-10-CM

## 2024-04-29 RX ORDER — FOLIC ACID 1 MG/1
1 TABLET ORAL DAILY
Qty: 90 TABLET | Refills: 0 | Status: SHIPPED | OUTPATIENT
Start: 2024-04-29

## 2024-04-29 NOTE — TELEPHONE ENCOUNTER
LOV: 1/31/2024    RTC: 3-4 months   Future Appointments   Date Time Provider Department Center   5/8/2024 10:45 AM YossiMaggie, DO [facility]   9/4/2024  9:30 AM Yossi Maggie, DO [facility]   11/27/2024  9:30 AM YossiMaggie, DO [facility]   LABS:   Component      Latest Ref Rng 4/24/2024   WBC      4.0 - 11.0 x10(3) uL 5.8    RBC      3.80 - 5.30 x10(6)uL 4.75    Hemoglobin      12.0 - 16.0 g/dL 14.7    Hematocrit      35.0 - 48.0 % 42.6    MCV      80.0 - 100.0 fL 89.7    MCH      26.0 - 34.0 pg 30.9    MCHC      31.0 - 37.0 g/dL 34.5    RDW-SD      35.1 - 46.3 fL 44.3    RDW      11.0 - 15.0 % 13.5    Platelet Count      150.0 - 450.0 10(3)uL 317.0    Prelim Neutrophil Abs      1.50 - 7.70 x10 (3) uL 2.83    Neutrophils Absolute      1.50 - 7.70 x10(3) uL 2.83    Lymphocytes Absolute      1.00 - 4.00 x10(3) uL 2.42    Monocytes Absolute      0.10 - 1.00 x10(3) uL 0.45    Eosinophils Absolute      0.00 - 0.70 x10(3) uL 0.08    Basophils Absolute      0.00 - 0.20 x10(3) uL 0.03    Immature Granulocyte Absolute      0.00 - 1.00 x10(3) uL 0.01    Neutrophils %      % 48.6    Lymphocytes %      % 41.6    Monocytes %      % 7.7    Eosinophils %      % 1.4    Basophils %      % 0.5    Immature Granulocyte %      % 0.2    Glucose      70 - 99 mg/dL 118 (H)    Sodium      136 - 145 mmol/L 140    Potassium      3.5 - 5.1 mmol/L 3.7    Chloride      98 - 112 mmol/L 110    Carbon Dioxide, Total      21.0 - 32.0 mmol/L 25.0    ANION GAP      0 - 18 mmol/L 5    BUN      9 - 23 mg/dL 14    CREATININE      0.55 - 1.02 mg/dL 0.72    BUN/CREATININE RATIO      10.0 - 20.0  19.4    CALCIUM      8.7 - 10.4 mg/dL 9.4    CALCULATED OSMOLALITY      275 - 295 mOsm/kg 292    EGFR      >=60 mL/min/1.73m2 106    ALT (SGPT)      10 - 49 U/L 32    AST (SGOT)      <=34 U/L 22    ALKALINE PHOSPHATASE      37 - 98 U/L 76    Total Bilirubin      0.3 - 1.2 mg/dL 0.3    PROTEIN, TOTAL      5.7 - 8.2 g/dL  7.0    Albumin      3.2 - 4.8 g/dL 4.4    Globulin      2.8 - 4.4 g/dL 2.6 (L)    A/G Ratio      1.0 - 2.0  1.7    Patient Fasting for CMP? No    Iron, Serum      50 - 170 ug/dL 122    Transferrin      250 - 380 mg/dL 256    Iron Bind.Cap.(TIBC)      250 - 425 ug/dL 381    Iron Saturation      15 - 50 % 32    FERRITIN      12.0 - 240.0 ng/mL 49.9    C-REACTIVE PROTEIN      <1.00 mg/dL <0.40    SED RATE      0 - 20 mm/Hr 24 (H)       Legend:  (H) High  (L) Low    LAST REFILL: 1/26/2024, Quantity 90 tablets, Refills 0    Dr Sy-- orders pending, approve if agreeable.

## 2024-05-08 ENCOUNTER — OFFICE VISIT (OUTPATIENT)
Dept: RHEUMATOLOGY | Facility: CLINIC | Age: 44
End: 2024-05-08
Payer: COMMERCIAL

## 2024-05-08 VITALS
BODY MASS INDEX: 37.88 KG/M2 | HEART RATE: 95 BPM | OXYGEN SATURATION: 97 % | TEMPERATURE: 98 F | SYSTOLIC BLOOD PRESSURE: 102 MMHG | HEIGHT: 61 IN | WEIGHT: 200.63 LBS | DIASTOLIC BLOOD PRESSURE: 70 MMHG | RESPIRATION RATE: 16 BRPM

## 2024-05-08 DIAGNOSIS — M35.9 UNDIFFERENTIATED CONNECTIVE TISSUE DISEASE (HCC): Primary | ICD-10-CM

## 2024-05-08 DIAGNOSIS — R53.82 CHRONIC FATIGUE: ICD-10-CM

## 2024-05-08 DIAGNOSIS — H20.9 UVEITIS: ICD-10-CM

## 2024-05-08 DIAGNOSIS — Z71.6 ENCOUNTER FOR SMOKING CESSATION COUNSELING: ICD-10-CM

## 2024-05-08 DIAGNOSIS — Z79.899 HIGH RISK MEDICATION USE: ICD-10-CM

## 2024-05-08 DIAGNOSIS — M62.838 MUSCLE SPASM: ICD-10-CM

## 2024-05-08 DIAGNOSIS — M25.50 POLYARTHRALGIA: ICD-10-CM

## 2024-05-08 DIAGNOSIS — Z79.899 IMMUNOCOMPROMISED STATE DUE TO DRUG THERAPY (HCC): ICD-10-CM

## 2024-05-08 DIAGNOSIS — D84.821 IMMUNOCOMPROMISED STATE DUE TO DRUG THERAPY (HCC): ICD-10-CM

## 2024-05-08 DIAGNOSIS — Z79.620 LONG-TERM USE OF ADALIMUMAB: ICD-10-CM

## 2024-05-08 RX ORDER — OMEPRAZOLE 20 MG/1
40 CAPSULE, DELAYED RELEASE ORAL
COMMUNITY

## 2024-05-08 RX ORDER — METHYLPREDNISOLONE 4 MG/1
TABLET ORAL
Qty: 1 EACH | Refills: 0 | Status: SHIPPED | OUTPATIENT
Start: 2024-05-08

## 2024-05-08 RX ORDER — METHOTREXATE 2.5 MG/1
20 TABLET ORAL WEEKLY
COMMUNITY
Start: 2024-05-08

## 2024-05-08 RX ORDER — ADALIMUMAB-ADAZ 40 MG/.4ML
INJECTION, SOLUTION SUBCUTANEOUS
COMMUNITY
Start: 2024-04-03

## 2024-05-08 RX ORDER — CYCLOBENZAPRINE HCL 5 MG
5 TABLET ORAL 2 TIMES DAILY PRN
Qty: 60 TABLET | Refills: 0 | Status: SHIPPED | OUTPATIENT
Start: 2024-05-08

## 2024-05-08 NOTE — PATIENT INSTRUCTIONS
-- increase methotrexate to 8 tabs once weekly  -- continue folic acid 1mg daily   -- continue adalimumab every 14 days  -- remember to hold meds if infection present  -- take medrol dose pack  -- try to make appt with ENT  -- follow up in 3 months or sooner as needed  -- get updated labs 4-6 weeks due to change in methotrexate    Dr. Sy

## 2024-05-08 NOTE — PROGRESS NOTES
?  RHEUMATOLOGY FOLLOW UP   Date of visit: 05/08/2024    Chief Complaint   Patient presents with    Follow - Up     LOV 1/31/2024. Pt states she has been feeling poorly lately. She has lt ankle pain, pain to the tops of her feet, lots of headaches, and pain to the middle of her back. Her pain is about a 5/10. Her Humira got switched to Hyrimoz and she will be taking her first dose of this on the upcoming weekend. She had an eye exam last month and everything was good just some dry eye.      ?  ASSESSMENT, DISCUSSION & PLAN   Assessment:  1. Undifferentiated connective tissue disease (HCC)    2. Polyarthralgia    3. Uveitis    4. High risk medication use    5. Encounter for smoking cessation counseling    6. Immunocompromised state due to drug therapy (HCC)    7. Chronic fatigue    8. Long-term use of adalimumab    9. Muscle spasm          Discussion:  Ms. Amelie Gonzalez is a 44 y with a plethora of complaints.  It seems that she was previously diagnosed with potentially lupus given Smith antibody positivity, however her EVIN was normal, complements normal as well as inflammatory marker were normal.  Patient does have a history of livedo reticularis and possible Raynaud's.  She also complains of rashes, oral/nasal ulcerations, joint pain with swelling as well as multisystem complaints.  It seems like she was previously put on Plaquenil and had facial rash, however unclear if rash was connected to medication itself.  She lacks any overt signs of synovitis on her exam today.  Her labs were grossly negative for autoimmune etiology to her livedo and Raynaud's. She had positive RNA virginia III testing but EIVN and otherwise negative workup.  She was seen by rheumatology at - said unlikely scleroderma. Recommended hand US if swelling recurs. Knee xrays consistent with mild arthritis. Recommended ophthalmology exam and to have schirmer testing done, would consider salivary lip biopsy vs parotid US for eval for Sjogren's.  Strongly recommended IBD evaluation     She has seen GI and colonoscopy was negative for any active colitis, endoscopy was consistent with gastritis.  She was given Xifaxan for IBS D which has improved her nausea.  Feels like her diarrhea has improved after the last set of steroids that I ordered.  In terms of neurology, she did undergo spinal tap and was told that testing was negative for multiple sclerosis.  Unclear if there has been any etiology found to her small fiber neuropathy.    She was started on Humira since her last visit due to worsened iritis/uveitis. She has tolerated this with improvement of eye inflammation  Will have her continue Humira q. 14 days- will check adalimumab levels and check for antibody positivity   Will increase methotrexate slightly   Strongly recommended smoking cessation.  Reminded that she will have to hold immunosuppression if any signs of infection.  Medrol dose pack given.  Continue cyclobenzaprine as needed  Strongly recommended ENT evaluation.     -- increase methotrexate to 8 tabs once weekly  -- continue folic acid 1mg daily   -- continue adalimumab every 14 days  -- remember to hold meds if infection present  -- take medrol dose pack  -- try to make appt with ENT  -- follow up in 3 months or sooner as needed  -- get updated labs 4-6 weeks due to change in methotrexate    Follow-up in 3 to 4 months or sooner as needed  Encouraged to reach out if symptoms change or worsen in the meantime    Humira is an injectable TNF-alpha inhibitor, which is a humanized monoclonal biologic medication used in the treatment of uveitis. This is a potent immunosuppressant medication, and as such runs the risk of increased susceptibility to infections. Based on data analysis, this is most commonly respiratory infections, but can also include all types of infection, including reactivation of latent tuberculosis if present. As such, we recommend aggressive screening for tuberculosis as well as  viral hepatitis. This medication also comes with a black box warning for the possibility of maligancy. It is unclear whether this is a true causal relationship, as many of the inflammatory diseases this agent is used in have an increased risk of malignancy as well.  As with any of the biologic medications, there is always risk of allergic reaction, including anaphylaxis. We discussed these risks, and it was agreed that the benefits outweigh the possible risks of medication usage.     We previously discussed the risk and benefit of methotrexate therapy, including the possibility of liver toxicity. We will monitor her liver functions in every month for the first couple months followed by every three months thereafter, we also discussed that pulmonary hypersensitivity reactions may have been at times, including lung toxicities and she was educated on signs and symptoms such as fever, persistent cough, and shortness of breath.Due to potential lung toxicity with this medication, baseline CXR should be on file.  There is also a risk of depression of the blood cell count. There is an increased risk of infection with this medication as well, particularly respiratory infections, we discussed this at length. We also discussed the risk of GI upset and the development of oral ulcers. It was explained in detail that folic acid was needed daily to help avoid many of these side effects.  Methotrexate is also an abortifacient medication with severe teratogenicity. This was discussed in detail as a risk. Should pregnancy occur, this medication would be extremely toxic to the fetus.  While on methotrexate, sulfa based drugs including but not limited to Bactrim, should be avoided due to potential toxicity.     She previously tried plaquenil (tried 2 different instances) but had rash/allergic reaction both times.   She may benefit from a low dose gabapentin vs low dose prednisone as recommended by neuro. Will consider this after  monitoring her response to getting inflammation under control     Patient verbalized understanding of above instructions. No further questions at this time.    Code selection for this visit was based on time spent (30min) on date of service in preparing to see the patient, obtaining and/or reviewing separately obtained history, performing a medically appropriate examination, counseling and educating the patient/family/caregiver, ordering medications or testing, referring and communicating with other healthcare providers, documenting clinical information in the E HR, independently interpreting results and communicating results to the patient/family/caregiver and care coordination with the patient's other providers. In addition to multiple Page[employer] messages prior to today's visit.   ?  Plan:  Diagnoses and all orders for this visit:    Undifferentiated connective tissue disease (HCC)  -     methylPREDNISolone (MEDROL) 4 MG Oral Tablet Therapy Pack; As directed.  -     CBC With Differential With Platelet; Future  -     Comp Metabolic Panel (14); Future  -     C-Reactive Protein; Future  -     Sed Rate, Westergren (Automated); Future  -     Adalimumab Activity and Neutralizing AB; Future    Polyarthralgia  -     methylPREDNISolone (MEDROL) 4 MG Oral Tablet Therapy Pack; As directed.  -     cyclobenzaprine 5 MG Oral Tab; Take 1 tablet (5 mg total) by mouth 2 (two) times daily as needed for Muscle spasms.    Uveitis  -     methylPREDNISolone (MEDROL) 4 MG Oral Tablet Therapy Pack; As directed.  -     CBC With Differential With Platelet; Future  -     Comp Metabolic Panel (14); Future  -     C-Reactive Protein; Future  -     Sed Rate, Westergren (Automated); Future  -     Adalimumab Activity and Neutralizing AB; Future    High risk medication use  -     methylPREDNISolone (MEDROL) 4 MG Oral Tablet Therapy Pack; As directed.  -     CBC With Differential With Platelet; Future  -     Comp Metabolic Panel (14); Future  -      C-Reactive Protein; Future  -     Sed Rate, Westergren (Automated); Future  -     Adalimumab Activity and Neutralizing AB; Future    Encounter for smoking cessation counseling    Immunocompromised state due to drug therapy (HCC)  -     methylPREDNISolone (MEDROL) 4 MG Oral Tablet Therapy Pack; As directed.  -     CBC With Differential With Platelet; Future  -     Comp Metabolic Panel (14); Future  -     C-Reactive Protein; Future  -     Sed Rate, Westergren (Automated); Future  -     Adalimumab Activity and Neutralizing AB; Future    Chronic fatigue    Long-term use of adalimumab  -     Adalimumab Activity and Neutralizing AB; Future    Muscle spasm  -     cyclobenzaprine 5 MG Oral Tab; Take 1 tablet (5 mg total) by mouth 2 (two) times daily as needed for Muscle spasms.                No follow-ups on file.  ?  HPI   Amelie Gonzalez is a 44 year old female with the following active problems who was seen as a new patient for evaluation of pain and fatigue.  She presents for follow up today.     Since her last visit, she has been doing okay.  Has had a few colds/sinus infections and thinks she had to skip a few weeks of the methotrexate and Humira.   Had been doing okay prior to that   Does feel like the humira has helped with the rash under the breast (completely resolved, now with some scar tissue).  Has some worsened pain over the past few weeks- diffusely  + increased dizziness/lightheaded ness- seen by PCP- told fluid behind the ears- doing claritin/flonase  + dull headache for weeks.   + sinus pain  Has not seen ENT since last year. Hx of tinnitus and intermittent hearing loss   Worsened mid back pain but attributes to stress and increased activity  Feels like left ankle is sprained but no swelling- difficulty with squatting and going up/down stairs  + still intermittent swelling over the top of the feet and hands  Iritis symptoms have improved and most recent eye exam showed no active cell. Still with some  photophobia. Told dry eyes- using artifical tears and allergy drops. Not on restasis at this time.    Last medrol dose pack was in march for the pain/swelling in the feet- improved significantly with dose pack.     Still dealing with constipation. Denies blood in the stools.   Denies obvious lymph nodes.    Prior rib pain ?intercostal/costochondritis has worsened over the past week.   Still with reflux/GERD- has switched various PPIs in the past. Has not gotten refill from GI due to lack of follow up. Has been doing OTC omeprazole 40mg daily which has been helping.     + intermittent palpitations   Mild dry mouth \"okay\" lately but had to get 3 teeth fixed d/t cracking from grinding. Has been trying to wear bite guard more consistently   Some recent oral ulcers but not as severe as previously - but only one over the past few weeks.   + livedo reticularis, just recently got worse  Had recent skin exam- has a dysplastic nevus on forehead that needs to have removed.     Still smoking- interested in stopping; but can smoke up to 1ppd.   Is under stress- work and mother with recent dx of breast cancer    Reminds me, she has been seen by [facility] for second opinion to be evaluated for scleroderma- was told unlikely scleroderma but recommended that she be monitored for lupus and should get further work up for IBD.       HPI from initial consultation  referred for rheumatologic evaluation due to second opinion.      States she first started to have problems about 2018. Thought she was getting lymph nodes enlarged in the neck. A rash on the forehead that looked like blisters. Both resolved on it's own.   Then developed livedo reticularis appearance over the legs.   Was seen by rheumatology and initially labs were normal so no intervention started initially.    Has been suffering from GI issues- abdominal pain/cramping, diarrhea without blood in stools, nausea without vomiting. States she has been told colitis  in the past. Has been chronic over the past few years.     Also rash under her breasts that lasted several months. Eventually went away on it's own.     After covid vaccination in Feb (2021), had worsened pain in her face. Has had repeat oral ulcerations. Has been seen by ENT and dentist. Had biopsy and told possible lichen planus. Has tried different ointments as well as mouth washes without improvement.     Had worsened hand swelling. When she followed with rheumatology again, her Queen antibody was positive but EVIN negative. She was started on plaquenil, and unclear if face rash started due to medication since rash still present intermittently. Happens randomly per pt, not necessarily with sun exposure.     Had worsened abdominal pain and dx with liver lesion, underwent surgical removal on 10/11. Does have improvement of the abdominal pain. Diagnosed with Von Meyenburg complex    + hx of sepsis from renal stones (2014)  + hair loss/thinning  + can get ulcer in the nose   + pain located in wrists b/l (was wearing braces in August), b/l knees. Can get swelling in the hand and knees. Swelling diffuse over the hands, not just over the joints.   + hx of one miscarriage at 12w, able to conceive after that. No other pregnancy complications.   + GERD on medication, controlled   + trouble swallowing, had swallow study done last year and full esophagram was normal   + dry eyes, uses artifical tears and gel at night, has tried plugs before but had allergic reaction to the material.  + dry mouth, not severe. No recurrent cavities.   + achilles pain with sometimes swelling, particularly when first getting up in the mornings   + abnormal fingernails- ridging and peeling   + possible hx of dactylitis about 2019- left ring finger; no recurrence since that time but has residual itching.   + chronic sinus disease; epistaxis in the arcos  + chronic cough  + smoker since age 15; has wellbutrin to try to help quit    The patient  denies elevated or scarring rashes, Raynaud's phenomenon, prior hematologic abnormality, prior renal disease (aside from the kidney stones), or history of seizures.  No history of prior blood clot in the legs or lungs, strokes or ischemic phenomenon.  Denies nonhealing ulcers on the fingertips.   The patient denies any history of uveitis, nodular painful shin bruises, psoriatic lesions, or history of dactylitis.    Works as opto technician.     Family hx:   Father with psoriatic arthritis on methotrexate and prednisone  Twin sister (identical) getting worked up for psoriatic arthritis vs mctd, has jo1+      Past Medical History:  Past Medical History:    Abdominal distention    Abdominal hernia    ABDOMINAL PAIN    mild cramping    Abdominal pain    Anxiety    Arthritis    Atypical mole    Back pain    Bad breath    Belching    Benign neoplasm of rectum    Bloating    Blood in the stool    Calculus of kidney    Cardiomegaly    CERVICAL DYSPLASIA    Change in hair    Chest pain    Constipation    Decorative tattoo    DEPRESSION    Diabetes mellitus (HCC)    gestational    Diarrhea, unspecified    Endometriosis    Enlarged lymph node    Fatigue    Flatulence/gas pain/belching    Food intolerance    Frequent urination    Frequent UTI    GERD    GERD (gastroesophageal reflux disease)    Gestational diabetes (HCC)         Headache disorder    Hearing loss    Heartburn    Hemorrhoids    History of depression    IBS (irritable bowel syndrome)    Indigestion    Irregular bowel habits    Kidney disease    Leaking of urine    Migraines    Mouth sores    Nausea    Night sweats    OBESITY    Pain in joints    Pain with bowel movements    Painful swallowing    PONV (postoperative nausea and vomiting)    Problems with swallowing    Rash    Skin blushing/flushing    Sleep disturbance    Stool incontinence    Thyroid nodule    Ulcer     Past Surgical History:  Past Surgical History:   Procedure Laterality Date        and 2014    Cholecystectomy  2009    Colonoscopy      Colposcopy,bx cervix/endocerv curr  2003    moderate dysplasia    Cysto/uretero w/lithotripsy Right 09/02/2014    Procedure: LITHOTRIPSY HOLMIUM LASER WITH CYSTOSCOPY;  Surgeon: Thierno Tovar DO;  Location: [facility]    Cystouretro w/stone remove Right 09/02/2014    Procedure: CYSTOSCOPY WITH RETROGRADE PYELOGRAM;  Surgeon: Thierno Tovar DO;  Location: [facility]    D & c  09/14/2012    Egd      Fragmenting of kidney stone Right 08/19/2014    Procedure: LITHOTRIPSY WITH CYSTOSCOPY, STENT PLACEMENT;  Surgeon: Thierno Tovar DO;  Location: [facility]    Hysterectomy  10/2022    Leep  2003    Lysis of adhesions      Desiree localization wire 1 site right (cpt=19281)      age 26 had Right biopsyexciosnal; fibrocystic tissue    Needle biopsy liver      Other surgical history  2005    Rt breast biopsy    Other surgical history  08/19/2014    Right ESWL- Dr. Webb    Other surgical history  09/02/2014    Right ESWL- Dr. Tovar    Other surgical history  09/08/2014    Cysto Stent Removal- Dr. Tovar    Tonsillectomy  1990    Total abdom hysterectomy      X-ray retrograde pyelogram Right 09/02/2014    Procedure: CYSTOSCOPY/URETEROSCOPY/STONE EXTRACTION;  Surgeon: Thierno Tovar DO;  Location: [facility]     Family History:  Family History   Problem Relation Age of Onset    Other (psoriatic arthritis) Father     Colon Polyps Mother     Lipids Mother     Heart Disorder Mother     Hypertension Mother     Diabetes Mother     Breast Cancer Mother 70    Cancer Mother     Colon Polyps Sister     Other (psoriatic arthritis) Sister     Other (mctd) Sister     Ovarian Cancer Paternal Aunt     Colon Polyps Sister      Social History:  Social History     Socioeconomic History    Marital status:    Tobacco Use    Smoking status: Every Day     Current packs/day: 1.00     Average packs/day: 1  pack/day for 15.0 years (15.0 ttl pk-yrs)     Types: Cigarettes    Smokeless tobacco: Never   Vaping Use    Vaping status: Never Used   Substance and Sexual Activity    Alcohol use: Not Currently    Drug use: No    Sexual activity: Yes     Partners: Male     Social Determinants of Health      Received from [facility]     Medications:  Outpatient Medications Marked as Taking for the 5/8/24 encounter (Office Visit) with Maggie Sy DO   Medication Sig Dispense Refill    HYRIMOZ 40 MG/0.4ML Subcutaneous Solution Auto-injector       omeprazole 20 MG Oral Capsule Delayed Release Take 2 capsules (40 mg total) by mouth every morning before breakfast.      methylPREDNISolone (MEDROL) 4 MG Oral Tablet Therapy Pack As directed. 1 each 0    methotrexate 2.5 MG Oral Tab Take 8 tablets (20 mg total) by mouth once a week.      cyclobenzaprine 5 MG Oral Tab Take 1 tablet (5 mg total) by mouth 2 (two) times daily as needed for Muscle spasms. 60 tablet 0    folic acid 1 MG Oral Tab Take 1 tablet (1 mg total) by mouth daily. 90 tablet 0    HUMIRA, 2 PEN, 40 MG/0.4ML Subcutaneous Pen-injector Kit every 14 (fourteen) days.      desvenlafaxine ER 50 MG Oral Tablet 24 Hr Take 1 tablet (50 mg total) by mouth daily.      olopatadine 0.1 % Ophthalmic Solution INSTILL 1 DROP BY OPHTHALMIC ROUTE 2 TIMES EVERY DAY INTO BOTH EYES AS NEEDED      hyoscyamine 0.125 MG Sublingual SL Tab Place 1 tablet (125 mcg total) under the tongue every 6 (six) hours as needed for Cramping. 120 tablet 0    Albuterol Sulfate  (90 Base) MCG/ACT Inhalation Aero Soln Inhale 2 puffs into the lungs every 6 (six) hours as needed.      LOPERAMIDE HCL OR Take by mouth as needed.       Modified Medications    Modified Medication Previous Medication    CYCLOBENZAPRINE 5 MG ORAL TAB cyclobenzaprine 5 MG Oral Tab       Take 1 tablet (5 mg total) by mouth 2 (two) times daily as needed for Muscle spasms.    Take 1 tablet (5 mg total) by mouth nightly  as needed for Muscle spasms.    METHOTREXATE 2.5 MG ORAL TAB methotrexate 2.5 MG Oral Tab       Take 8 tablets (20 mg total) by mouth once a week.    Take 7 tablets (17.5 mg total) by mouth once a week.     Medications Discontinued During This Encounter   Medication Reason    methylPREDNISolone (MEDROL) 4 MG Oral Tablet Therapy Pack     PANTOPRAZOLE 40 MG Oral Tab EC     cyclobenzaprine 5 MG Oral Tab     methotrexate 2.5 MG Oral Tab      ?  ?  Allergies:  Allergies   Allergen Reactions    Aluminum RASH and OTHER (SEE COMMENTS)     Ulcers on skin    Azithromycin ANGIOEDEMA and SWELLING    Biaxin [Clarithromycin] TONGUE SWELLING    Clarithromycin ANGIOEDEMA, SWELLING and TONGUE SWELLING     Tongue swells and develops sores in mouth  Tongue swells    Tongue swells and develops sores in mouth    Duloxetine MYALGIA and OTHER (SEE COMMENTS)     Vomiting and jaw lock    Vomiting and jaw lock  Other reaction(s): Myalgias (muscle pain)  Vomiting and jaw lock    Erythromycin ANGIOEDEMA and SWELLING    Nickel OTHER (SEE COMMENTS) and RASH     Skin ulcer.    Skin ulcer.  Skin ulcer.    Nickel RASH     Also reaction to aluminum- enviornmental      Silver UNKNOWN    Hydroxychloroquine RASH     ?  REVIEW OF SYSTEMS   ?  Review of Systems   Constitutional:  Positive for malaise/fatigue. Negative for chills, fever and weight loss.   HENT:  Positive for sinus pain.    Eyes:  Positive for blurred vision, double vision and photophobia. Negative for pain and redness.   Respiratory:  Positive for cough and shortness of breath. Negative for hemoptysis.    Cardiovascular:  Positive for chest pain and palpitations. Negative for leg swelling.   Gastrointestinal:  Positive for constipation and heartburn. Negative for abdominal pain, blood in stool, diarrhea, nausea and vomiting.   Genitourinary:  Positive for urgency. Negative for dysuria, frequency and hematuria.   Musculoskeletal:  Positive for back pain, joint pain (but some hand swelling)  and neck pain. Negative for myalgias.   Skin:  Negative for itching and rash.   Neurological:  Positive for dizziness (intermittently) and headaches (intermittently). Negative for tingling and weakness.   Endo/Heme/Allergies:  Positive for environmental allergies. Bruises/bleeds easily.   Psychiatric/Behavioral:  Positive for depression. The patient is nervous/anxious and has insomnia.      PHYSICAL EXAM   Today's Vitals:  Temperature Blood Pressure Heart Rate Resp Rate SpO2   Temp: 98.1 °F (36.7 °C) BP: 102/70 Pulse: 95 Resp: 16 SpO2: 97 %   ?  Current Weight Height BMI BSA Pain   Wt Readings from Last 1 Encounters:   05/08/24 200 lb 9.6 oz (91 kg)    Height: 5' 1\" (154.9 cm) Body mass index is 37.9 kg/m². Body surface area is 1.89 meters squared.         Physical Exam  Vitals and nursing note reviewed.   Constitutional:       General: She is not in acute distress.     Appearance: Normal appearance. She is well-developed. She is not diaphoretic.   HENT:      Head: Normocephalic.   Eyes:      General: No scleral icterus.     Extraocular Movements: Extraocular movements intact.      Conjunctiva/sclera: Conjunctivae normal.   Neck:      Vascular: No JVD.      Trachea: No tracheal deviation.   Cardiovascular:      Rate and Rhythm: Normal rate and regular rhythm.      Heart sounds: No murmur heard.  Pulmonary:      Effort: Pulmonary effort is normal. No respiratory distress.      Breath sounds: Normal breath sounds. No wheezing.   Musculoskeletal:         General: Tenderness present. No swelling.      Cervical back: Neck supple.      Comments: Diffuse tenderness- particularly over Mcps and dorsum of hands and b/l wrist - improved overall  No swelling, redness or restriction of motion of the DIPs, PIPs, MCPs, wrists, elbows, ankles, or joints of the feet except right mid foot  Post surgical changes noted over distal right foot  Puffiness over index/middle fingers MCPs to PIPs but not over joints.   Bilateral shoulders  with full ROM, no evidence of impingement with provocative maneuvers.  Bilateral knees with medial joint line tenderness, no crepitus, no effusion.  Medial distal knee tenderness   Lymphadenopathy:      Cervical: No cervical adenopathy.   Skin:     General: Skin is warm and dry.      Findings: No erythema or rash.      Comments: No periungal erythema   No skin tightening noted over fingers/hands  No malar rash  Livedo reticularis    Neurological:      Mental Status: She is alert and oriented to person, place, and time.      Cranial Nerves: No cranial nerve deficit.      Gait: Gait normal.   Psychiatric:         Mood and Affect: Mood normal.         Behavior: Behavior normal.       ?  Radiology review:       MRI's reviewed and to be scanned into chart.       PROCEDURE: CT ABDOMEN TRIPHASIC LIVER (W+WO)(CPT=74170)       COMPARISON: [facility], CT APPENDIX ABD PEL W CONTRAST (TNA=[id number]), 9/10/2021, 1:56 PM.  [facility], MRI LIVER (W+WO) (CPT=74183), 8/25/2021, 12:47 PM.       INDICATIONS: History of cystic liver lesion, status post surgical resection of segment VIII (10/11/2021).       TECHNIQUE: Multidetector CT images of the abdomen were obtained without and with non-ionic intravenous contrast material (in the arterial and portal venous phases). Automated exposure control for dose reduction was used. Adjustment of the mA and/or kV   was done based on the patient's size. Iterative reconstruction technique for dose reduction was employed. Water was ingested immediately prior to image acquisition; no oral contrast was ingested.       FINDINGS:   LUNG BASES: The heart is normal in size. There is dependent subsegmental atelectasis bilaterally.   LIVER: Postoperative changes are suggested with demonstration of a 2.7 x 1.9 x 3.6 cm residual hypodensity in segment VIII, intervally decreased from 7.6 x 6.9 x 6.5 cm previously; this has lobulated contours. Overlying capsular  retraction is seen   extending to the right hemidiaphragmatic leaflet. No definite enhancement is identified.   Additional well-circumscribed cystic lesions are present, not significantly changed from prior exams.   BILIARY: The gallbladder is surgically absent with cholecystectomy clips in the gallbladder fossa. Extrahepatic biliary dilatation measures up to 0.8 cm.   PANCREAS: No lesion, fluid collection, ductal dilatation, or atrophy.     SPLEEN: No enlargement.     ADRENALS:   No defined mass or abnormal enlargement.     KIDNEYS:   Bilateral renal calculi are apparent. No ureteral calculi are identified in the imaged volume. There is no hydronephrosis or hydroureter. No perinephric or periureteric fat stranding is appreciated. Lobulated renal contours are seen,   suggestive of multifocal renal cortical scarring. Multiple well circumscribed renal hypodensities are present and are not completely characterized, but statistically likely represent cysts. No suspicious enhancing renal lesions are evident. The   visualized portions of the ureters are normal in course and caliber without visualized filling defects on excretory phase imaging.   GI/MESENTERY: A small hiatal hernia is evident. Mild mucosal enhancement of the terminal ileum is suggested. There is no evidence of bowel obstruction. A normal caliber appendix is seen without inflammatory manifestations.     VASCULATURE:   No aneurysm is detected.   RETROPERITONEUM: No mass or lymphadenopathy is apparent.     BONES:   Multilevel degenerative changes are seen throughout the spine.   ABDOMINAL WALL: There is a minute fat-containing umbilical hernia superimposed on slight periumbilical diastasis.   OTHER: No free air or fluid is seen in the abdomen.                         Impression   CONCLUSION:   1. Postprocedural changes of partial cyst resection in segment VIII. The previously seen cystic lesion has partially involuted. No suspicious features are identified.        2. Additional scattered cystic hepatic lesions appear grossly stable.       3. Nonspecific mild mucosal enhancement of the terminal ileum, perhaps indicative of underlying ileitis. Correlation for relevant symptomatology is suggested.       4. Bilateral renal calculi are seen without evidence of obstructive uropathy.       5. Renal cortical defects, particular on the right, may reflect renal cortical scarring.       6. Status post cholecystectomy with mild extrahepatic biliary dilatation, perhaps related to the postcholecystectomy state.       7. Lesser incidental findings as above.         Dictated by (CST): Reinaldo Wilkes MD on 12/01/2021 at 2:44 PM       Finalized by (CST): Reinaldo Wilkes MD on 12/01/2021 at 2:56 PM             PROCEDURE: MRI LIVER (W+WO) (CPT=74183)       COMPARISON: None.       INDICATIONS: Liver cyst. Follow up.       TECHNIQUE: A comprehensive MRI examination of the abdomen was performed utilizing a variety of imaging planes and imaging parameters to optimize visualization of suspected pathology.  Images were obtained both before and after intravenous gadolinium   injection.         FINDINGS:   LIVER: There is a lobulated T2 hyperintense lesion which appears to be centered in the hepatic dome segment 8 measuring 7.0 x 7.6 x 5.8 cm. This may have some thin internal septation but otherwise no evidence of significant enhancement.  There are   multiple additional scattered smaller cysts seen throughout the liver.  There is a 0.7 cm T2 hyperintense lesion in the left lobe segment 2. This demonstrates homogeneous arterial enhancement which persists on delayed sequences and most likely reflects a    small flash filling meningioma.   GALLBLADDER: Gallbladder surgically absent.   BILIARY TREE: Common bile duct measures 0.8 cm with distal gradual tapering.  No intrahepatic biliary ductal dilatation.  There is low insertion of a cystic duct remanent noted which is not significantly dilated.    PANCREAS: Unremarkable.   SPLEEN: Unremarkable.   ADRENALS: Unremarkable.   KIDNEYS: Enhance symmetrically without hydronephrosis.  Cortical scarring noted in the interpolar right kidney.  A subcentimeter right lower pole renal cyst noted.   VASCULATURE:             Abdominal aorta is normal in caliber.  The main portal vein is patent.   LYMPHADENOPATHY: None.     BOWEL: Visualized bowel is nondilated.   BONES: Suboptimally evaluated but marrow signal is grossly unremarkable.   LUNG BASES: No significant finding.   OTHER: Negative.                     Impression   CONCLUSION:       7.6 cm cyst hepatic dome as described above with additional smaller scattered hepatic cysts.       Probable subcentimeter left hepatic flash filling hemangioma.       Cholecystectomy.       Mildly dilated common bile duct with distal gradual tapering.  Findings may reflect post cholecystectomy change.  Advise appropriate laboratory correlation.       Small right renal cyst.               Dictated by (CST): Manny Sanchez MD on 8/25/2021 at 1:50 PM       Finalized by (CST): Manny Sanchez MD on 8/25/2021 at 2:12 PM        HISTORY: Persistent cough for 2 months.  Occasional shortness of breath.     TECHNIQUE:  2 view chest.     COMPARISON: 9/6/2014.     FINDINGS/   IMPRESSION:   Equivocal minor peribronchial thickening.  No discrete focal airspace consolidation or pleural effusion.     No overt enlargement of the cardiac silhouette.     FINAL REPORT   Attending Radiologist:  Maverick Irving MD   Date Signed Off:  12/26/2020 16:18          Labs:  Lab Results   Component Value Date    WBC 5.8 04/24/2024    RBC 4.75 04/24/2024    HGB 14.7 04/24/2024    HCT 42.6 04/24/2024    .0 04/24/2024    MCV 89.7 04/24/2024    MCH 30.9 04/24/2024    MCHC 34.5 04/24/2024    RDW 13.5 04/24/2024    NEPRELIM 2.83 04/24/2024    NEPERCENT 48.6 04/24/2024    LYPERCENT 41.6 04/24/2024    MOPERCENT 7.7 04/24/2024    EOPERCENT 1.4 04/24/2024    BAPERCENT  0.5 04/24/2024    NE 2.83 04/24/2024    LYMABS 2.42 04/24/2024    MOABSO 0.45 04/24/2024    EOABSO 0.08 04/24/2024    BAABSO 0.03 04/24/2024     Lab Results   Component Value Date     (H) 04/24/2024    BUN 14 04/24/2024    BUNCREA 19.4 04/24/2024    CREATSERUM 0.72 04/24/2024    ANIONGAP 5 04/24/2024    GFR >59 12/04/2010    GFRNAA 93 08/01/2022    GFRAA 108 08/01/2022    CA 9.4 04/24/2024    OSMOCALC 292 04/24/2024    ALKPHO 76 04/24/2024    AST 22 04/24/2024    ALT 32 04/24/2024    BILT 0.3 04/24/2024    TP 7.0 04/24/2024    ALB 4.4 04/24/2024    GLOBULIN 2.6 (L) 04/24/2024    AGRATIO 1.8 12/04/2010     04/24/2024    K 3.7 04/24/2024     04/24/2024    CO2 25.0 04/24/2024       Additional Labs:  04/2024  CRP normal  ESR 24 borderline  CMP grossly normal  CBC grossly normal  Iron studies normal     01/2024  CRP normal  ESR 21 borderline  CMP grossly normal  CBC grossly normal    11/2023  HLA-B27 negative  ESR 39 elevated  CRP 0.5 normal  CMP grossly normal  CBC grossly normal  HCV nonreactive  Hep B core antibody, IgM, total nonreactive  Hepatitis surface antibody reactive  QuantiFERON-TB negative    08/2023  CBC with WBC 3.4 borderline low  CRP normal  ESR 37 elevated    09/2023  CRP 0.42 borderline   ESR 20 normal     08/2023  CRP normal  ESR 25 elevated  B12 448 normal     07/2023  ESR 33 borderline  CRP 0.32 borderline  CBC grossly normal  CMP grossly normal    06/2023  ESR 29 borderline  CRP 0.32 borderline  CBC grossly normal  CMP grossly normal  Vit D 24.7 low    04/2023  ESR 44 elevated  CRP 0.82 elevated    01/2023  IgA normal  U1 RNP negative  RNP 70 negative  ESR 16 normal  CRP 0.34 borderline  CMP grossly normal  CBC grossly normal  Vit D 25.8 low    11/11/2022  ESR 31 elevated  CRP 0.69 elevated  CMP grossly normal  CBC grossly normal    08/2022  Vit D 24.4 low  ESR 13  CRP 0.59 borderline   RNA polymerase III 59 moderate positive  EVIN by IFA negative    04/2022  EVIN/EVIN panel  negative  ESR 16 normal  CRP 0.43 borderline  RNA polymerase III 64 moderate positive  EVIN by IFA negative    02/2022  EVIN by IFA  CRISTELA Panel negative  CRP 0.54 elevated   ESR 16 normal   RNA virginia III 60 moderate positive     10/2021  ANCA negative (MPO/PR3) negative  ESR 15 normal   CRP 0.31  Cryoglobulin negative   SPEP grossly negative   CBC grossly normal   CMP grossly normal   IgA, IgG, IgM normal   Total complement normal   B12 525 normal  AVISE grossly negative with exception of equivocal RNA virginia III    04/2021  CK normal  C3 152 normal  C4 24.1 normal  dsDNA negative  SSA SSB negative  Smith moderate positive  Queen/RNP moderate positive  EVIN by IFA negative    08/2020  Vitamin D 32.5  B12 384  ESR 7 normal  CRP 0.37 borderline    04/2019  B2 G negative  RF negative  CCP negative  EBV IgG, AG ABS positive; IgA negative  MPO, TN-3, ANCA negative  SPEP grossly normal  Cryoglobulin negative  LAC negative  ACL negative  HIV negative  HCV nonreactive  Hepatitis B serologies normal    Maggie Yossi, DO  [facility]  05/08/2024

## 2024-07-23 ENCOUNTER — LAB ENCOUNTER (OUTPATIENT)
Dept: LAB | Facility: REFERENCE LAB | Age: 44
End: 2024-07-23
Attending: INTERNAL MEDICINE
Payer: COMMERCIAL

## 2024-07-23 DIAGNOSIS — Z79.899 IMMUNOCOMPROMISED STATE DUE TO DRUG THERAPY (HCC): ICD-10-CM

## 2024-07-23 DIAGNOSIS — D84.821 IMMUNOCOMPROMISED STATE DUE TO DRUG THERAPY (HCC): ICD-10-CM

## 2024-07-23 DIAGNOSIS — H20.9 UVEITIS: ICD-10-CM

## 2024-07-23 DIAGNOSIS — M35.9 UNDIFFERENTIATED CONNECTIVE TISSUE DISEASE (HCC): ICD-10-CM

## 2024-07-23 DIAGNOSIS — Z79.620 LONG-TERM USE OF ADALIMUMAB: ICD-10-CM

## 2024-07-23 DIAGNOSIS — Z79.899 HIGH RISK MEDICATION USE: ICD-10-CM

## 2024-07-23 LAB
ALBUMIN SERPL-MCNC: 4.5 G/DL (ref 3.2–4.8)
ALBUMIN/GLOB SERPL: 1.7 {RATIO} (ref 1–2)
ALP LIVER SERPL-CCNC: 76 U/L
ALT SERPL-CCNC: 20 U/L
ANION GAP SERPL CALC-SCNC: 7 MMOL/L (ref 0–18)
AST SERPL-CCNC: 16 U/L (ref ?–34)
BASOPHILS # BLD AUTO: 0.05 X10(3) UL (ref 0–0.2)
BASOPHILS NFR BLD AUTO: 0.5 %
BILIRUB SERPL-MCNC: 0.2 MG/DL (ref 0.3–1.2)
BUN BLD-MCNC: 16 MG/DL (ref 9–23)
BUN/CREAT SERPL: 20.3 (ref 10–20)
CALCIUM BLD-MCNC: 9.6 MG/DL (ref 8.7–10.4)
CHLORIDE SERPL-SCNC: 112 MMOL/L (ref 98–112)
CO2 SERPL-SCNC: 22 MMOL/L (ref 21–32)
CREAT BLD-MCNC: 0.79 MG/DL
CRP SERPL-MCNC: <0.4 MG/DL (ref ?–1)
DEPRECATED RDW RBC AUTO: 44.2 FL (ref 35.1–46.3)
EGFRCR SERPLBLD CKD-EPI 2021: 95 ML/MIN/1.73M2 (ref 60–?)
EOSINOPHIL # BLD AUTO: 0.09 X10(3) UL (ref 0–0.7)
EOSINOPHIL NFR BLD AUTO: 1 %
ERYTHROCYTE [DISTWIDTH] IN BLOOD BY AUTOMATED COUNT: 13.5 % (ref 11–15)
ERYTHROCYTE [SEDIMENTATION RATE] IN BLOOD: 24 MM/HR
FASTING STATUS PATIENT QL REPORTED: NO
GLOBULIN PLAS-MCNC: 2.7 G/DL (ref 2–3.5)
GLUCOSE BLD-MCNC: 114 MG/DL (ref 70–99)
HCT VFR BLD AUTO: 41.7 %
HGB BLD-MCNC: 14.4 G/DL
IMM GRANULOCYTES # BLD AUTO: 0.04 X10(3) UL (ref 0–1)
IMM GRANULOCYTES NFR BLD: 0.4 %
LYMPHOCYTES # BLD AUTO: 3.14 X10(3) UL (ref 1–4)
LYMPHOCYTES NFR BLD AUTO: 33.6 %
MCH RBC QN AUTO: 31 PG (ref 26–34)
MCHC RBC AUTO-ENTMCNC: 34.5 G/DL (ref 31–37)
MCV RBC AUTO: 89.9 FL
MONOCYTES # BLD AUTO: 0.77 X10(3) UL (ref 0.1–1)
MONOCYTES NFR BLD AUTO: 8.2 %
NEUTROPHILS # BLD AUTO: 5.26 X10 (3) UL (ref 1.5–7.7)
NEUTROPHILS # BLD AUTO: 5.26 X10(3) UL (ref 1.5–7.7)
NEUTROPHILS NFR BLD AUTO: 56.3 %
OSMOLALITY SERPL CALC.SUM OF ELEC: 294 MOSM/KG (ref 275–295)
PLATELET # BLD AUTO: 316 10(3)UL (ref 150–450)
POTASSIUM SERPL-SCNC: 4 MMOL/L (ref 3.5–5.1)
PROT SERPL-MCNC: 7.2 G/DL (ref 5.7–8.2)
RBC # BLD AUTO: 4.64 X10(6)UL
SODIUM SERPL-SCNC: 141 MMOL/L (ref 136–145)
WBC # BLD AUTO: 9.4 X10(3) UL (ref 4–11)

## 2024-07-23 PROCEDURE — 80299 QUANTITATIVE ASSAY DRUG: CPT | Performed by: INTERNAL MEDICINE

## 2024-07-23 PROCEDURE — 85025 COMPLETE CBC W/AUTO DIFF WBC: CPT | Performed by: INTERNAL MEDICINE

## 2024-07-23 PROCEDURE — 85652 RBC SED RATE AUTOMATED: CPT | Performed by: INTERNAL MEDICINE

## 2024-07-23 PROCEDURE — 82397 CHEMILUMINESCENT ASSAY: CPT | Performed by: INTERNAL MEDICINE

## 2024-07-23 PROCEDURE — 86140 C-REACTIVE PROTEIN: CPT | Performed by: INTERNAL MEDICINE

## 2024-07-23 PROCEDURE — 80053 COMPREHEN METABOLIC PANEL: CPT | Performed by: INTERNAL MEDICINE

## 2024-07-25 DIAGNOSIS — M25.50 POLYARTHRALGIA: ICD-10-CM

## 2024-07-25 DIAGNOSIS — M35.9 UNDIFFERENTIATED CONNECTIVE TISSUE DISEASE (HCC): ICD-10-CM

## 2024-07-25 DIAGNOSIS — H20.9 UVEITIS: ICD-10-CM

## 2024-07-25 NOTE — TELEPHONE ENCOUNTER
Last office visit: 5/8/2024    Next Rheum Apt:9/4/2024 Yossi DO Maggie    Last fill: 4/22/2024 84 tab, 0 refills    Per last office visit, pt now taking 8 tabs weekly. Pending medication as such.    Labs:   Lab Results   Component Value Date    CREATSERUM 0.79 07/23/2024    GFR >59 12/04/2010    ALKPHO 76 07/23/2024    AST 16 07/23/2024    ALT 20 07/23/2024    BILT 0.2 (L) 07/23/2024    TP 7.2 07/23/2024    ALB 4.5 07/23/2024       Lab Results   Component Value Date    WBC 9.4 07/23/2024    HGB 14.4 07/23/2024    .0 07/23/2024    NEPRELIM 5.26 07/23/2024    NEPERCENT 56.3 07/23/2024    LYPERCENT 33.6 07/23/2024    NE 5.26 07/23/2024    LYMABS 3.14 07/23/2024

## 2024-07-26 RX ORDER — METHOTREXATE 2.5 MG/1
20 TABLET ORAL WEEKLY
Qty: 104 TABLET | Refills: 0 | Status: SHIPPED | OUTPATIENT
Start: 2024-07-26

## 2024-07-31 ENCOUNTER — TELEPHONE (OUTPATIENT)
Dept: CARDIOLOGY | Age: 44
End: 2024-07-31

## 2024-08-05 LAB
ADALIMUMAB LVL: 17 UG/ML
ANTI-ADALIMUMAB AB: <25 NG/ML

## 2024-08-06 RX ORDER — ADALIMUMAB-ADAZ 40 MG/.4ML
1 INJECTION, SOLUTION SUBCUTANEOUS
Qty: 2 EACH | Refills: 3 | Status: SHIPPED | OUTPATIENT
Start: 2024-08-06

## 2024-08-06 NOTE — TELEPHONE ENCOUNTER
LOV: 5/8/2024    RTC: 3-4 months   Future Appointments   Date Time Provider Department Center   9/4/2024  9:30 AM Maggie Sy DO [facility]   11/27/2024  9:30 AM Maggie Sy DO [facility]   LABS:   Component      Latest Ref Rng 7/23/2024   WBC      4.0 - 11.0 x10(3) uL 9.4    RBC      3.80 - 5.30 x10(6)uL 4.64    Hemoglobin      12.0 - 16.0 g/dL 14.4    Hematocrit      35.0 - 48.0 % 41.7    MCV      80.0 - 100.0 fL 89.9    MCH      26.0 - 34.0 pg 31.0    MCHC      31.0 - 37.0 g/dL 34.5    RDW-SD      35.1 - 46.3 fL 44.2    RDW      11.0 - 15.0 % 13.5    Platelet Count      150.0 - 450.0 10(3)uL 316.0    Prelim Neutrophil Abs      1.50 - 7.70 x10 (3) uL 5.26    Neutrophils Absolute      1.50 - 7.70 x10(3) uL 5.26    Lymphocytes Absolute      1.00 - 4.00 x10(3) uL 3.14    Monocytes Absolute      0.10 - 1.00 x10(3) uL 0.77    Eosinophils Absolute      0.00 - 0.70 x10(3) uL 0.09    Basophils Absolute      0.00 - 0.20 x10(3) uL 0.05    Immature Granulocyte Absolute      0.00 - 1.00 x10(3) uL 0.04    Neutrophils %      % 56.3    Lymphocytes %      % 33.6    Monocytes %      % 8.2    Eosinophils %      % 1.0    Basophils %      % 0.5    Immature Granulocyte %      % 0.4    Glucose      70 - 99 mg/dL 114 (H)    Sodium      136 - 145 mmol/L 141    Potassium      3.5 - 5.1 mmol/L 4.0    Chloride      98 - 112 mmol/L 112    Carbon Dioxide, Total      21.0 - 32.0 mmol/L 22.0    ANION GAP      0 - 18 mmol/L 7    BUN      9 - 23 mg/dL 16    CREATININE      0.55 - 1.02 mg/dL 0.79    BUN/CREATININE RATIO      10.0 - 20.0  20.3 (H)    CALCIUM      8.7 - 10.4 mg/dL 9.6    CALCULATED OSMOLALITY      275 - 295 mOsm/kg 294    EGFR      >=60 mL/min/1.73m2 95    ALT (SGPT)      10 - 49 U/L 20    AST (SGOT)      <34 U/L 16    ALKALINE PHOSPHATASE      37 - 98 U/L 76    Total Bilirubin      0.3 - 1.2 mg/dL 0.2 (L)    PROTEIN, TOTAL      5.7 - 8.2 g/dL 7.2    Albumin      3.2 - 4.8 g/dL 4.5    Globulin       2.0 - 3.5 g/dL 2.7    A/G Ratio      1.0 - 2.0  1.7    Patient Fasting for CMP? No    Anti-Adalimumab Ab      ng/mL <25    Adalimumab Lvl      ug/mL 17    C-REACTIVE PROTEIN      <1.00 mg/dL <0.40    SED RATE      0 - 20 mm/Hr 24 (H)       Legend:  (H) High  (L) Low    LAST REFILL: 5/8/2024 \"historical med noted\"    Dr Sy-- orders pending, approve if agreeable

## 2024-08-20 ASSESSMENT — PATIENT HEALTH QUESTIONNAIRE - PHQ9
CLINICAL INTERPRETATION OF PHQ2 SCORE: NO FURTHER SCREENING NEEDED
1. LITTLE INTEREST OR PLEASURE IN DOING THINGS: NOT AT ALL
SUM OF ALL RESPONSES TO PHQ9 QUESTIONS 1 AND 2: 0
2. FEELING DOWN, DEPRESSED OR HOPELESS: NOT AT ALL
SUM OF ALL RESPONSES TO PHQ9 QUESTIONS 1 AND 2: 0

## 2024-08-21 ENCOUNTER — APPOINTMENT (OUTPATIENT)
Dept: CARDIOLOGY | Age: 44
End: 2024-08-21

## 2024-08-21 VITALS
WEIGHT: 191.8 LBS | HEIGHT: 62 IN | BODY MASS INDEX: 35.3 KG/M2 | SYSTOLIC BLOOD PRESSURE: 116 MMHG | HEART RATE: 90 BPM | RESPIRATION RATE: 16 BRPM | DIASTOLIC BLOOD PRESSURE: 85 MMHG

## 2024-08-21 DIAGNOSIS — R00.2 PALPITATIONS: Primary | ICD-10-CM

## 2024-08-21 LAB
ATRIAL RATE (BPM): 89
P AXIS (DEGREES): 40
PR-INTERVAL (MSEC): 172
QRS-INTERVAL (MSEC): 82
QT-INTERVAL (MSEC): 364
QTC: 443
R AXIS (DEGREES): 4
REPORT TEXT: NORMAL
T AXIS (DEGREES): 27
VENTRICULAR RATE EKG/MIN (BPM): 89

## 2024-08-21 RX ORDER — METHOTREXATE 2.5 MG/1
TABLET ORAL
COMMUNITY

## 2024-08-21 RX ORDER — DESVENLAFAXINE 50 MG/1
50 TABLET, FILM COATED, EXTENDED RELEASE ORAL DAILY
COMMUNITY

## 2024-08-21 RX ORDER — ADALIMUMAB-ADAZ 40 MG/.4ML
INJECTION, SOLUTION SUBCUTANEOUS
COMMUNITY

## 2024-08-21 SDOH — HEALTH STABILITY: PHYSICAL HEALTH: ON AVERAGE, HOW MANY DAYS PER WEEK DO YOU ENGAGE IN MODERATE TO STRENUOUS EXERCISE (LIKE A BRISK WALK)?: 7 DAYS

## 2024-08-21 SDOH — HEALTH STABILITY: PHYSICAL HEALTH: ON AVERAGE, HOW MANY MINUTES DO YOU ENGAGE IN EXERCISE AT THIS LEVEL?: 20 MIN

## 2024-08-22 ENCOUNTER — TELEPHONE (OUTPATIENT)
Dept: CARDIOLOGY | Age: 44
End: 2024-08-22

## 2024-08-30 ENCOUNTER — APPOINTMENT (OUTPATIENT)
Dept: CARDIOLOGY | Age: 44
End: 2024-08-30
Attending: INTERNAL MEDICINE

## 2024-08-30 ENCOUNTER — ANCILLARY PROCEDURE (OUTPATIENT)
Dept: CARDIOLOGY | Age: 44
End: 2024-08-30
Attending: INTERNAL MEDICINE

## 2024-08-30 DIAGNOSIS — R00.2 PALPITATIONS: ICD-10-CM

## 2024-08-30 LAB
AORTIC VALVE AREA (AVA): 0.79
ASCENDING AORTA (AAD): 3
AV PEAK GRADIENT (AVPG): 5
AV STENOSIS SEVERITY TEXT: NORMAL
AV VMAX SYS DOP: 1.11
AVI LVOT PEAK GRADIENT (LVOTMG): 0.9
E WAVE DECELARATION TIME (MDT): 11.39
INTERVENTRICULAR SEPTUM IN END DIASTOLE (IVSD): 2.17
LEFT INTERNAL DIMENSION IN SYSTOLE (LVSD): 0.9
LEFT VENTRICULAR INTERNAL DIMENSION IN DIASTOLE (LVDD): 2.9
LEFT VENTRICULAR POSTERIOR WALL IN END DIASTOLE (LVPW): 5
LV EF 2D ECHO EST: NORMAL %
LVOT VTI (LVOTVTI): 1.01
MV E TISSUE VEL MED (MESV): 13.8
MV E WAVE VEL/E TISSUE VEL MED(MSR): 6.9
MV PEAK A VELOCITY (MVPAV): 195
MV PEAK E VELOCITY (MVPEV): 0.71
RV END SYSTOLIC LONGITUDINAL STRAIN FREE WALL (RVGS): 2.1
TRICUSPID VALVE ANNULAR PEAK VELOCITY (TVAPV): 19
TRICUSPID VALVE PEAK REGURGITATION VELOCITY (TRPV): 3.2
TV ESTIMATED RIGHT ARTERIAL PRESSURE (RAP): 9.03

## 2024-08-30 PROCEDURE — 93306 TTE W/DOPPLER COMPLETE: CPT | Performed by: INTERNAL MEDICINE

## 2024-08-30 PROCEDURE — 76376 3D RENDER W/INTRP POSTPROCES: CPT | Performed by: INTERNAL MEDICINE

## 2024-09-04 ENCOUNTER — OFFICE VISIT (OUTPATIENT)
Dept: RHEUMATOLOGY | Facility: CLINIC | Age: 44
End: 2024-09-04
Payer: COMMERCIAL

## 2024-09-04 VITALS
WEIGHT: 192 LBS | OXYGEN SATURATION: 100 % | DIASTOLIC BLOOD PRESSURE: 78 MMHG | TEMPERATURE: 99 F | HEART RATE: 88 BPM | SYSTOLIC BLOOD PRESSURE: 122 MMHG | RESPIRATION RATE: 16 BRPM | HEIGHT: 61 IN | BODY MASS INDEX: 36.25 KG/M2

## 2024-09-04 DIAGNOSIS — D84.821 IMMUNOCOMPROMISED STATE DUE TO DRUG THERAPY (HCC): ICD-10-CM

## 2024-09-04 DIAGNOSIS — Z71.6 ENCOUNTER FOR SMOKING CESSATION COUNSELING: ICD-10-CM

## 2024-09-04 DIAGNOSIS — M25.50 POLYARTHRALGIA: ICD-10-CM

## 2024-09-04 DIAGNOSIS — Z86.69 HISTORY OF IRITIS: ICD-10-CM

## 2024-09-04 DIAGNOSIS — M35.9 UNDIFFERENTIATED CONNECTIVE TISSUE DISEASE (HCC): Primary | ICD-10-CM

## 2024-09-04 DIAGNOSIS — H20.9 UVEITIS: ICD-10-CM

## 2024-09-04 DIAGNOSIS — Z79.620 LONG-TERM USE OF ADALIMUMAB: ICD-10-CM

## 2024-09-04 DIAGNOSIS — Z11.1 SCREENING FOR TUBERCULOSIS: ICD-10-CM

## 2024-09-04 DIAGNOSIS — Z79.899 HIGH RISK MEDICATION USE: ICD-10-CM

## 2024-09-04 DIAGNOSIS — Z79.899 IMMUNOCOMPROMISED STATE DUE TO DRUG THERAPY (HCC): ICD-10-CM

## 2024-09-04 PROCEDURE — 3078F DIAST BP <80 MM HG: CPT | Performed by: INTERNAL MEDICINE

## 2024-09-04 PROCEDURE — G2211 COMPLEX E/M VISIT ADD ON: HCPCS | Performed by: INTERNAL MEDICINE

## 2024-09-04 PROCEDURE — 99214 OFFICE O/P EST MOD 30 MIN: CPT | Performed by: INTERNAL MEDICINE

## 2024-09-04 PROCEDURE — 3008F BODY MASS INDEX DOCD: CPT | Performed by: INTERNAL MEDICINE

## 2024-09-04 PROCEDURE — 3074F SYST BP LT 130 MM HG: CPT | Performed by: INTERNAL MEDICINE

## 2024-09-04 RX ORDER — FLUTICASONE PROPIONATE 50 MCG
2 SPRAY, SUSPENSION (ML) NASAL DAILY
COMMUNITY
Start: 2024-04-29

## 2024-09-04 RX ORDER — DESVENLAFAXINE 100 MG/1
100 TABLET, EXTENDED RELEASE ORAL DAILY
COMMUNITY
Start: 2024-08-22

## 2024-09-04 RX ORDER — LORATADINE 10 MG/1
10 TABLET ORAL DAILY
COMMUNITY

## 2024-09-04 RX ORDER — FOLIC ACID 1 MG/1
1 TABLET ORAL DAILY
Qty: 90 TABLET | Refills: 0 | Status: SHIPPED | OUTPATIENT
Start: 2024-09-04

## 2024-09-04 NOTE — PROGRESS NOTES
?  RHEUMATOLOGY FOLLOW UP   Date of visit: 09/04/2024    Chief Complaint   Patient presents with    Undifferentiated Connective Tissue Disease     3 month f/u. Feeling ok. Once in awhile will get some hand and foot swelling. Slight knee pain at times. No new symptoms. Converted rapid score of 0.7     ?  ASSESSMENT, DISCUSSION & PLAN   Assessment:  1. Undifferentiated connective tissue disease (HCC)    2. Long-term use of adalimumab    3. Immunocompromised state due to drug therapy (HCC)    4. Encounter for smoking cessation counseling    5. Polyarthralgia    6. High risk medication use    7. Screening for tuberculosis    8. History of iritis    9. Uveitis        Discussion:  Ms. Amelie Gonzalez is a 44 y with a plethora of complaints.  It seems that she was previously diagnosed with potentially lupus given Smith antibody positivity, however her EVIN was normal, complements normal as well as inflammatory marker were normal.  Patient does have a history of livedo reticularis and possible Raynaud's.  She also complains of rashes, oral/nasal ulcerations, joint pain with swelling as well as multisystem complaints.  It seems like she was previously put on Plaquenil and had facial rash, however unclear if rash was connected to medication itself.  She lacks any overt signs of synovitis on her exam today.  Her labs were grossly negative for autoimmune etiology to her livedo and Raynaud's. She had positive RNA virginia III testing but EVIN and otherwise negative workup.  She was seen by rheumatology at - said unlikely scleroderma. Recommended hand US if swelling recurs. Knee xrays consistent with mild arthritis. Recommended ophthalmology exam and to have schirmer testing done, would consider salivary lip biopsy vs parotid US for eval for Sjogren's. Strongly recommended IBD evaluation     She has seen GI and colonoscopy was negative for any active colitis, endoscopy was consistent with gastritis.  She was given Xifaxan for IBS D  which has improved her nausea.  Feels like her diarrhea has improved after the last set of steroids that I ordered.  In terms of neurology, she did undergo spinal tap and was told that testing was negative for multiple sclerosis.  Unclear if there has been any etiology found to her small fiber neuropathy.    She was started on Humira since her last visit due to worsened iritis/uveitis. She has tolerated this with improvement of eye inflammation - had to change to hyrimoz   She had a flare of the inflammatory eye disease and recommended she consider changing biologic but she is okay with holding off.   Strongly recommended smoking cessation.  Reminded that she will have to hold immunosuppression if any signs of infection  Still strongly recommended ENT evaluation.     -- continue methotrexate to 8 tabs once weekly  -- continue folic acid 1mg daily   -- continue adalimumab every 14 days  -- remember to hold meds if infection present  -- try to make appt with ENT  -- continue cyclobenzaprine as needed  -- follow up in 3 months or sooner as needed  -- attempt smoking cessation or at least cutting back as this is likely worsening your inflammation     Follow-up in 3 to 4 months or sooner as needed  Encouraged to reach out if symptoms change or worsen in the meantime    Humira is an injectable TNF-alpha inhibitor, which is a humanized monoclonal biologic medication used in the treatment of uveitis. This is a potent immunosuppressant medication, and as such runs the risk of increased susceptibility to infections. Based on data analysis, this is most commonly respiratory infections, but can also include all types of infection, including reactivation of latent tuberculosis if present. As such, we recommend aggressive screening for tuberculosis as well as viral hepatitis. This medication also comes with a black box warning for the possibility of maligancy. It is unclear whether this is a true causal relationship, as many of  the inflammatory diseases this agent is used in have an increased risk of malignancy as well.  As with any of the biologic medications, there is always risk of allergic reaction, including anaphylaxis. We discussed these risks, and it was agreed that the benefits outweigh the possible risks of medication usage.     We previously discussed the risk and benefit of methotrexate therapy, including the possibility of liver toxicity. We will monitor her liver functions in every month for the first couple months followed by every three months thereafter, we also discussed that pulmonary hypersensitivity reactions may have been at times, including lung toxicities and she was educated on signs and symptoms such as fever, persistent cough, and shortness of breath.Due to potential lung toxicity with this medication, baseline CXR should be on file.  There is also a risk of depression of the blood cell count. There is an increased risk of infection with this medication as well, particularly respiratory infections, we discussed this at length. We also discussed the risk of GI upset and the development of oral ulcers. It was explained in detail that folic acid was needed daily to help avoid many of these side effects.  Methotrexate is also an abortifacient medication with severe teratogenicity. This was discussed in detail as a risk. Should pregnancy occur, this medication would be extremely toxic to the fetus.  While on methotrexate, sulfa based drugs including but not limited to Bactrim, should be avoided due to potential toxicity.     She previously tried plaquenil (tried 2 different instances) but had rash/allergic reaction both times.   She may benefit from a low dose gabapentin vs low dose prednisone as recommended by neuro. Will consider this after monitoring her response to getting inflammation under control     Patient verbalized understanding of above instructions. No further questions at this time.    Code selection for  this visit was based on time spent (30min) on date of service in preparing to see the patient, obtaining and/or reviewing separately obtained history, performing a medically appropriate examination, counseling and educating the patient/family/caregiver, ordering medications or testing, referring and communicating with other healthcare providers, documenting clinical information in the E HR, independently interpreting results and communicating results to the patient/family/caregiver and care coordination with the patient's other providers. In addition to multiple [employer] messages prior to today's visit.   ?  Plan:  Diagnoses and all orders for this visit:    Undifferentiated connective tissue disease (HCC)  -     CBC With Differential With Platelet; Future  -     Comp Metabolic Panel (14); Future  -     C-Reactive Protein; Future  -     Sed Rate, Westergren (Automated); Future  -     folic acid 1 MG Oral Tab; Take 1 tablet (1 mg total) by mouth daily.    Long-term use of adalimumab    Immunocompromised state due to drug therapy (HCC)  -     CBC With Differential With Platelet; Future  -     Comp Metabolic Panel (14); Future  -     C-Reactive Protein; Future  -     Sed Rate, Westergren (Automated); Future    Encounter for smoking cessation counseling    Polyarthralgia  -     folic acid 1 MG Oral Tab; Take 1 tablet (1 mg total) by mouth daily.    High risk medication use  -     CBC With Differential With Platelet; Future  -     Comp Metabolic Panel (14); Future  -     C-Reactive Protein; Future  -     Sed Rate, Westergren (Automated); Future    Screening for tuberculosis  -     Quantiferon TB Plus; Future    History of iritis    Uveitis                  Return in about 3 months (around 12/4/2024).  ?  HPI   Amelie Gonzalez is a 44 year old female with the following active problems who was seen as a new patient for evaluation of pain and fatigue.  She presents for follow up today.     Since her last visit, she has been  doing okay.  Having worsened palpitations and fluttering last month around time of iritis flare. Is currently wearing a heart monitor after seeing cardiology. Also had EKG/ECHO done last week- waiting for results- does have follow up on 9/18.   Had iritis flare in July- used prednisone drops and has since been able to taper off and told quiet at this time.   Using artifical tears. Is considering xiidra if the dryness worsens.     Had to switch per insurance to Hyrimoz from her Humira.   Sates pen is harder to inject with her hand pain compared to the Humira pens. Does alternate legs. Had one time bruise when she did after a walk.     Pain is \"okay\" right now but in July had increased itchy bumps over the abdomen which have since resolved.   Hand pain and swelling more intermittent  Still having right sided rib pain intermittently   Prior mid back pain has subsided  Left ankle pain not an issues.     Had improvement of dizziness but still comes/goes.   Has not seen ENT since last year. Hx of tinnitus and intermittent hearing loss     Still dealing with constipation. Denies blood in the stools.   Still with reflux/GERD- has switched various PPIs in the past. Has not gotten refill from GI due to lack of follow up. Has been doing OTC nexium which she feels helping more than omeprazole     Increased dry mouth lately.   Has been trying to wear bite guard more consistently   Denies recent mouth sores.   + livedo reticularis, just recently got worse with weather changes    Still smoking- currently up to 1ppd.   Is under stress- work and mother with recent dx of breast cancer- now on radiation and monthly chemo.     Reminds me, she has been seen by [facility] for second opinion to be evaluated for scleroderma- was told unlikely scleroderma but recommended that she be monitored for lupus and should get further work up for IBD.       HPI from initial consultation  referred for rheumatologic evaluation due to  second opinion.      States she first started to have problems about 2018. Thought she was getting lymph nodes enlarged in the neck. A rash on the forehead that looked like blisters. Both resolved on it's own.   Then developed livedo reticularis appearance over the legs.   Was seen by rheumatology and initially labs were normal so no intervention started initially.    Has been suffering from GI issues- abdominal pain/cramping, diarrhea without blood in stools, nausea without vomiting. States she has been told colitis in the past. Has been chronic over the past few years.     Also rash under her breasts that lasted several months. Eventually went away on it's own.     After covid vaccination in Feb (2021), had worsened pain in her face. Has had repeat oral ulcerations. Has been seen by ENT and dentist. Had biopsy and told possible lichen planus. Has tried different ointments as well as mouth washes without improvement.     Had worsened hand swelling. When she followed with rheumatology again, her Queen antibody was positive but EVIN negative. She was started on plaquenil, and unclear if face rash started due to medication since rash still present intermittently. Happens randomly per pt, not necessarily with sun exposure.     Had worsened abdominal pain and dx with liver lesion, underwent surgical removal on 10/11. Does have improvement of the abdominal pain. Diagnosed with Von Meyenburg complex    + hx of sepsis from renal stones (2014)  + hair loss/thinning  + can get ulcer in the nose   + pain located in wrists b/l (was wearing braces in August), b/l knees. Can get swelling in the hand and knees. Swelling diffuse over the hands, not just over the joints.   + hx of one miscarriage at 12w, able to conceive after that. No other pregnancy complications.   + GERD on medication, controlled   + trouble swallowing, had swallow study done last year and full esophagram was normal   + dry eyes, uses artifical tears and gel at  night, has tried plugs before but had allergic reaction to the material.  + dry mouth, not severe. No recurrent cavities.   + achilles pain with sometimes swelling, particularly when first getting up in the mornings   + abnormal fingernails- ridging and peeling   + possible hx of dactylitis about 2019- left ring finger; no recurrence since that time but has residual itching.   + chronic sinus disease; epistaxis in the arcos  + chronic cough  + smoker since age 15; has wellbutrin to try to help quit    The patient denies elevated or scarring rashes, Raynaud's phenomenon, prior hematologic abnormality, prior renal disease (aside from the kidney stones), or history of seizures.  No history of prior blood clot in the legs or lungs, strokes or ischemic phenomenon.  Denies nonhealing ulcers on the fingertips.   The patient denies any history of uveitis, nodular painful shin bruises, psoriatic lesions, or history of dactylitis.    Works as opto technician.     Family hx:   Father with psoriatic arthritis on methotrexate and prednisone  Twin sister (identical) getting worked up for psoriatic arthritis vs mctd, has jo1+      Past Medical History:  Past Medical History:    Abdominal distention    Abdominal hernia    ABDOMINAL PAIN    mild cramping    Abdominal pain    Anxiety    Arthritis    Atypical mole    Back pain    Bad breath    Belching    Benign neoplasm of rectum    Bloating    Blood in the stool    Calculus of kidney    Cardiomegaly    CERVICAL DYSPLASIA    Change in hair    Chest pain    Constipation    Decorative tattoo    DEPRESSION    Diabetes mellitus (HCC)    gestational    Diarrhea, unspecified    Endometriosis    Enlarged lymph node    Fatigue    Flatulence/gas pain/belching    Food intolerance    Frequent urination    Frequent UTI    GERD    GERD (gastroesophageal reflux disease)    Gestational diabetes (HCC)         Headache disorder    Hearing loss    Heartburn    Hemorrhoids    History of depression     IBS (irritable bowel syndrome)    Indigestion    Irregular bowel habits    Kidney disease    Leaking of urine    Migraines    Mouth sores    Nausea    Night sweats    OBESITY    Pain in joints    Pain with bowel movements    Painful swallowing    PONV (postoperative nausea and vomiting)    Problems with swallowing    Rash    Skin blushing/flushing    Sleep disturbance    Stool incontinence    Thyroid nodule    Ulcer     Past Surgical History:  Past Surgical History:   Procedure Laterality Date       and     Cholecystectomy      Colonoscopy      Colposcopy,bx cervix/endocerv curr      moderate dysplasia    Cysto/uretero w/lithotripsy Right 2014    Procedure: LITHOTRIPSY HOLMIUM LASER WITH CYSTOSCOPY;  Surgeon: Thierno Tovar DO;  Location: [facility]    Cystouretro w/stone remove Right 2014    Procedure: CYSTOSCOPY WITH RETROGRADE PYELOGRAM;  Surgeon: Thierno Tovar DO;  Location: [facility]    D & c  2012    Egd      Fragmenting of kidney stone Right 2014    Procedure: LITHOTRIPSY WITH CYSTOSCOPY, STENT PLACEMENT;  Surgeon: Thierno Tovar DO;  Location: [facility]    Hysterectomy  10/2022    Leep  2003    Lysis of adhesions      Desiree localization wire 1 site right (cpt=19281)      age 26 had Right biopsyexciosnal; fibrocystic tissue    Needle biopsy liver      Other surgical history      Rt breast biopsy    Other surgical history  2014    Right ESWL- Dr. Webb    Other surgical history  2014    Right ESWL- Dr. Tovar    Other surgical history  2014    Cysto Stent Removal- Dr. Tovar    Tonsillectomy      Total abdom hysterectomy      X-ray retrograde pyelogram Right 2014    Procedure: CYSTOSCOPY/URETEROSCOPY/STONE EXTRACTION;  Surgeon: Thierno Tovar DO;  Location: [facility]     Family History:  Family History   Problem Relation Age of Onset    Other  (psoriatic arthritis) Father     Colon Polyps Mother     Lipids Mother     Heart Disorder Mother     Hypertension Mother     Diabetes Mother     Breast Cancer Mother 70    Cancer Mother     Colon Polyps Sister     Other (psoriatic arthritis) Sister     Other (mctd) Sister     Ovarian Cancer Paternal Aunt     Colon Polyps Sister      Social History:  Social History     Socioeconomic History    Marital status:    Tobacco Use    Smoking status: Every Day     Current packs/day: 1.00     Average packs/day: 1 pack/day for 15.0 years (15.0 ttl pk-yrs)     Types: Cigarettes    Smokeless tobacco: Never   Vaping Use    Vaping status: Never Used   Substance and Sexual Activity    Alcohol use: Not Currently    Drug use: No    Sexual activity: Yes     Partners: Male     Social Determinants of Health     Physical Activity: Medium Risk (8/21/2024)    Received from [facility]    Exercise Vital Sign     On average, how many days per week do you engage in moderate to strenuous exercise (like a brisk walk)?: 7 days     On average, how many minutes do you engage in exercise at this level?: 20 min    [email or web address]     Medications:  Outpatient Medications Marked as Taking for the 9/4/24 encounter (Office Visit) with Maggie Sy, DO   Medication Sig Dispense Refill    desvenlafaxine  MG Oral Tablet 24 Hr Take 1 tablet (100 mg total) by mouth daily.      Esomeprazole Magnesium 20 MG Oral Capsule Delayed Release Take 1 capsule (20 mg total) by mouth every morning before breakfast.      fluticasone propionate 50 MCG/ACT Nasal Suspension 2 sprays by Nasal route daily.      loratadine 10 MG Oral Tab Take 1 tablet (10 mg total) by mouth daily.      folic acid 1 MG Oral Tab Take 1 tablet (1 mg total) by mouth daily. 90 tablet 0    Adalimumab-adaz (HYRIMOZ) 40 MG/0.4ML Subcutaneous Solution Auto-injector INJECT 1 PEN UNDER THE SKIN EVERY 14 DAYS 2 each 3    methotrexate 2.5 MG  Oral Tab Take 8 tablets (20 mg total) by mouth once a week. 104 tablet 0    cyclobenzaprine 5 MG Oral Tab Take 1 tablet (5 mg total) by mouth 2 (two) times daily as needed for Muscle spasms. 60 tablet 0    prednisoLONE 1 % Ophthalmic Suspension Place 1 drop into both eyes 4 (four) times daily.      hydrocortisone 2.5 % External Ointment       olopatadine 0.1 % Ophthalmic Solution INSTILL 1 DROP BY OPHTHALMIC ROUTE 2 TIMES EVERY DAY INTO BOTH EYES AS NEEDED      hyoscyamine 0.125 MG Sublingual SL Tab Place 1 tablet (125 mcg total) under the tongue every 6 (six) hours as needed for Cramping. 120 tablet 0    Albuterol Sulfate  (90 Base) MCG/ACT Inhalation Aero Soln Inhale 2 puffs into the lungs every 6 (six) hours as needed.      LOPERAMIDE HCL OR Take by mouth as needed.       Modified Medications    Modified Medication Previous Medication    FOLIC ACID 1 MG ORAL TAB folic acid 1 MG Oral Tab       Take 1 tablet (1 mg total) by mouth daily.    Take 1 tablet (1 mg total) by mouth daily.     Medications Discontinued During This Encounter   Medication Reason    desvenlafaxine ER 50 MG Oral Tablet 24 Hr     omeprazole 20 MG Oral Capsule Delayed Release     folic acid 1 MG Oral Tab      ?  ?  Allergies:  Allergies   Allergen Reactions    Aluminum RASH and OTHER (SEE COMMENTS)     Ulcers on skin    Azithromycin ANGIOEDEMA and SWELLING    Biaxin [Clarithromycin] TONGUE SWELLING    Clarithromycin ANGIOEDEMA, SWELLING and TONGUE SWELLING     Tongue swells and develops sores in mouth  Tongue swells    Tongue swells and develops sores in mouth    Duloxetine MYALGIA and OTHER (SEE COMMENTS)     Vomiting and jaw lock    Vomiting and jaw lock  Other reaction(s): Myalgias (muscle pain)  Vomiting and jaw lock    Erythromycin ANGIOEDEMA and SWELLING    Nickel OTHER (SEE COMMENTS) and RASH     Skin ulcer.    Skin ulcer.  Skin ulcer.    Nickel RASH     Also reaction to aluminum- enviornmental      Silver UNKNOWN     Hydroxychloroquine RASH     ?  REVIEW OF SYSTEMS   ?  Review of Systems   Constitutional:  Positive for malaise/fatigue and weight loss (d/tstress). Negative for chills and fever.   HENT:  Positive for congestion.    Eyes:  Positive for blurred vision, double vision and photophobia. Negative for pain and redness.   Respiratory:  Positive for cough. Negative for hemoptysis and shortness of breath.    Cardiovascular:  Positive for chest pain and palpitations. Negative for leg swelling.   Gastrointestinal:  Positive for constipation and heartburn. Negative for abdominal pain, blood in stool, diarrhea and nausea.   Genitourinary:  Positive for urgency. Negative for dysuria, frequency and hematuria.   Musculoskeletal:  Positive for back pain, joint pain (but some hand swelling) and neck pain. Negative for myalgias.   Skin:  Negative for itching and rash.   Neurological:  Positive for dizziness (intermittently) and headaches (intermittently). Negative for tingling and weakness.   Endo/Heme/Allergies:  Positive for environmental allergies. Bruises/bleeds easily.   Psychiatric/Behavioral:  Positive for depression. The patient is nervous/anxious and has insomnia.      PHYSICAL EXAM   Today's Vitals:  Temperature Blood Pressure Heart Rate Resp Rate SpO2   Temp: 98.7 °F (37.1 °C) BP: 122/78 Pulse: 88 Resp: 16 SpO2: 100 %   ?  Current Weight Height BMI BSA Pain   Wt Readings from Last 1 Encounters:   09/04/24 192 lb (87.1 kg)    Height: 5' 1\" (154.9 cm) Body mass index is 36.28 kg/m². Body surface area is 1.86 meters squared.         Physical Exam  Vitals and nursing note reviewed.   Constitutional:       General: She is not in acute distress.     Appearance: Normal appearance. She is well-developed. She is not diaphoretic.   HENT:      Head: Normocephalic.   Eyes:      General: No scleral icterus.     Extraocular Movements: Extraocular movements intact.      Conjunctiva/sclera: Conjunctivae normal.   Neck:      Vascular: No  JVD.      Trachea: No tracheal deviation.   Cardiovascular:      Rate and Rhythm: Normal rate and regular rhythm.      Heart sounds: No murmur heard.  Pulmonary:      Effort: Pulmonary effort is normal. No respiratory distress.      Breath sounds: Normal breath sounds. No wheezing.   Musculoskeletal:         General: Tenderness present. No swelling.      Cervical back: Neck supple.      Comments: Diffuse tenderness- particularly over Mcps and dorsum of hands and b/l wrist - improved overall  No swelling, redness or restriction of motion of the DIPs, PIPs, MCPs, wrists, elbows, ankles, or joints of the feet except right mid foot  Post surgical changes noted over distal right foot  Puffiness over index/middle fingers MCPs to PIPs but not over joints.   Bilateral shoulders with full ROM, no evidence of impingement with provocative maneuvers.  Bilateral knees with medial joint line tenderness, no crepitus, no effusion.  Medial distal knee tenderness- stable   Lymphadenopathy:      Cervical: No cervical adenopathy.   Skin:     General: Skin is warm and dry.      Findings: No erythema or rash.      Comments: No periungal erythema   No skin tightening noted over fingers/hands  No malar rash  Livedo reticularis    Neurological:      Mental Status: She is alert and oriented to person, place, and time.      Cranial Nerves: No cranial nerve deficit.      Gait: Gait normal.   Psychiatric:         Mood and Affect: Mood normal.         Behavior: Behavior normal.       ?  Radiology review:       MRI's reviewed and to be scanned into chart.       PROCEDURE: CT ABDOMEN TRIPHASIC LIVER (W+WO)(CPT=74170)       COMPARISON: [facility], CT APPENDIX ABD PEL W CONTRAST (SON=[id number]), 9/10/2021, 1:56 PM.  [facility], MRI LIVER (W+WO) (CPT=74183), 8/25/2021, 12:47 PM.       INDICATIONS: History of cystic liver lesion, status post surgical resection of segment VIII (10/11/2021).       TECHNIQUE:  Multidetector CT images of the abdomen were obtained without and with non-ionic intravenous contrast material (in the arterial and portal venous phases). Automated exposure control for dose reduction was used. Adjustment of the mA and/or kV   was done based on the patient's size. Iterative reconstruction technique for dose reduction was employed. Water was ingested immediately prior to image acquisition; no oral contrast was ingested.       FINDINGS:   LUNG BASES: The heart is normal in size. There is dependent subsegmental atelectasis bilaterally.   LIVER: Postoperative changes are suggested with demonstration of a 2.7 x 1.9 x 3.6 cm residual hypodensity in segment VIII, intervally decreased from 7.6 x 6.9 x 6.5 cm previously; this has lobulated contours. Overlying capsular retraction is seen   extending to the right hemidiaphragmatic leaflet. No definite enhancement is identified.   Additional well-circumscribed cystic lesions are present, not significantly changed from prior exams.   BILIARY: The gallbladder is surgically absent with cholecystectomy clips in the gallbladder fossa. Extrahepatic biliary dilatation measures up to 0.8 cm.   PANCREAS: No lesion, fluid collection, ductal dilatation, or atrophy.     SPLEEN: No enlargement.     ADRENALS:   No defined mass or abnormal enlargement.     KIDNEYS:   Bilateral renal calculi are apparent. No ureteral calculi are identified in the imaged volume. There is no hydronephrosis or hydroureter. No perinephric or periureteric fat stranding is appreciated. Lobulated renal contours are seen,   suggestive of multifocal renal cortical scarring. Multiple well circumscribed renal hypodensities are present and are not completely characterized, but statistically likely represent cysts. No suspicious enhancing renal lesions are evident. The   visualized portions of the ureters are normal in course and caliber without visualized filling defects on excretory phase imaging.    GI/MESENTERY: A small hiatal hernia is evident. Mild mucosal enhancement of the terminal ileum is suggested. There is no evidence of bowel obstruction. A normal caliber appendix is seen without inflammatory manifestations.     VASCULATURE:   No aneurysm is detected.   RETROPERITONEUM: No mass or lymphadenopathy is apparent.     BONES:   Multilevel degenerative changes are seen throughout the spine.   ABDOMINAL WALL: There is a minute fat-containing umbilical hernia superimposed on slight periumbilical diastasis.   OTHER: No free air or fluid is seen in the abdomen.                         Impression   CONCLUSION:   1. Postprocedural changes of partial cyst resection in segment VIII. The previously seen cystic lesion has partially involuted. No suspicious features are identified.       2. Additional scattered cystic hepatic lesions appear grossly stable.       3. Nonspecific mild mucosal enhancement of the terminal ileum, perhaps indicative of underlying ileitis. Correlation for relevant symptomatology is suggested.       4. Bilateral renal calculi are seen without evidence of obstructive uropathy.       5. Renal cortical defects, particular on the right, may reflect renal cortical scarring.       6. Status post cholecystectomy with mild extrahepatic biliary dilatation, perhaps related to the postcholecystectomy state.       7. Lesser incidental findings as above.         Dictated by (CST): Reinaldo Wilkes MD on 12/01/2021 at 2:44 PM       Finalized by (CST): Reinaldo Wilkes MD on 12/01/2021 at 2:56 PM             PROCEDURE: MRI LIVER (W+WO) (CPT=74183)       COMPARISON: None.       INDICATIONS: Liver cyst. Follow up.       TECHNIQUE: A comprehensive MRI examination of the abdomen was performed utilizing a variety of imaging planes and imaging parameters to optimize visualization of suspected pathology.  Images were obtained both before and after intravenous gadolinium   injection.         FINDINGS:   LIVER: There  is a lobulated T2 hyperintense lesion which appears to be centered in the hepatic dome segment 8 measuring 7.0 x 7.6 x 5.8 cm. This may have some thin internal septation but otherwise no evidence of significant enhancement.  There are   multiple additional scattered smaller cysts seen throughout the liver.  There is a 0.7 cm T2 hyperintense lesion in the left lobe segment 2. This demonstrates homogeneous arterial enhancement which persists on delayed sequences and most likely reflects a    small flash filling meningioma.   GALLBLADDER: Gallbladder surgically absent.   BILIARY TREE: Common bile duct measures 0.8 cm with distal gradual tapering.  No intrahepatic biliary ductal dilatation.  There is low insertion of a cystic duct remanent noted which is not significantly dilated.   PANCREAS: Unremarkable.   SPLEEN: Unremarkable.   ADRENALS: Unremarkable.   KIDNEYS: Enhance symmetrically without hydronephrosis.  Cortical scarring noted in the interpolar right kidney.  A subcentimeter right lower pole renal cyst noted.   VASCULATURE:             Abdominal aorta is normal in caliber.  The main portal vein is patent.   LYMPHADENOPATHY: None.     BOWEL: Visualized bowel is nondilated.   BONES: Suboptimally evaluated but marrow signal is grossly unremarkable.   LUNG BASES: No significant finding.   OTHER: Negative.                     Impression   CONCLUSION:       7.6 cm cyst hepatic dome as described above with additional smaller scattered hepatic cysts.       Probable subcentimeter left hepatic flash filling hemangioma.       Cholecystectomy.       Mildly dilated common bile duct with distal gradual tapering.  Findings may reflect post cholecystectomy change.  Advise appropriate laboratory correlation.       Small right renal cyst.               Dictated by (CST): Manny Sanchez MD on 8/25/2021 at 1:50 PM       Finalized by (CST): Manny Sanchez MD on 8/25/2021 at 2:12 PM        HISTORY: Persistent cough for 2  months.  Occasional shortness of breath.     TECHNIQUE:  2 view chest.     COMPARISON: 9/6/2014.     FINDINGS/   IMPRESSION:   Equivocal minor peribronchial thickening.  No discrete focal airspace consolidation or pleural effusion.     No overt enlargement of the cardiac silhouette.     FINAL REPORT   Attending Radiologist:  Maverick Irving MD   Date Signed Off:  12/26/2020 16:18          Labs:  Lab Results   Component Value Date    WBC 9.4 07/23/2024    RBC 4.64 07/23/2024    HGB 14.4 07/23/2024    HCT 41.7 07/23/2024    .0 07/23/2024    MCV 89.9 07/23/2024    MCH 31.0 07/23/2024    MCHC 34.5 07/23/2024    RDW 13.5 07/23/2024    NEPRELIM 5.26 07/23/2024    NEPERCENT 56.3 07/23/2024    LYPERCENT 33.6 07/23/2024    MOPERCENT 8.2 07/23/2024    EOPERCENT 1.0 07/23/2024    BAPERCENT 0.5 07/23/2024    NE 5.26 07/23/2024    LYMABS 3.14 07/23/2024    MOABSO 0.77 07/23/2024    EOABSO 0.09 07/23/2024    BAABSO 0.05 07/23/2024     Lab Results   Component Value Date     (H) 07/23/2024    BUN 16 07/23/2024    BUNCREA 20.3 (H) 07/23/2024    CREATSERUM 0.79 07/23/2024    ANIONGAP 7 07/23/2024    GFR >59 12/04/2010    GFRNAA 93 08/01/2022    GFRAA 108 08/01/2022    CA 9.6 07/23/2024    OSMOCALC 294 07/23/2024    ALKPHO 76 07/23/2024    AST 16 07/23/2024    ALT 20 07/23/2024    BILT 0.2 (L) 07/23/2024    TP 7.2 07/23/2024    ALB 4.5 07/23/2024    GLOBULIN 2.7 07/23/2024    AGRATIO 1.8 12/04/2010     07/23/2024    K 4.0 07/23/2024     07/23/2024    CO2 22.0 07/23/2024       Additional Labs:  07/2024  Adalimumab antibody negative  Adalimumab level 17  ESR 24 borderline elevated  CRP normal  CMP grossly normal  CBC grossly normal    04/2024  CRP normal  ESR 24 borderline  CMP grossly normal  CBC grossly normal  Iron studies normal     01/2024  CRP normal  ESR 21 borderline  CMP grossly normal  CBC grossly normal    11/2023  HLA-B27 negative  ESR 39 elevated  CRP 0.5 normal  CMP grossly normal  CBC grossly  normal  HCV nonreactive  Hep B core antibody, IgM, total nonreactive  Hepatitis surface antibody reactive  QuantiFERON-TB negative    08/2023  CBC with WBC 3.4 borderline low  CRP normal  ESR 37 elevated    09/2023  CRP 0.42 borderline   ESR 20 normal     08/2023  CRP normal  ESR 25 elevated  B12 448 normal     07/2023  ESR 33 borderline  CRP 0.32 borderline  CBC grossly normal  CMP grossly normal    06/2023  ESR 29 borderline  CRP 0.32 borderline  CBC grossly normal  CMP grossly normal  Vit D 24.7 low    04/2023  ESR 44 elevated  CRP 0.82 elevated    01/2023  IgA normal  U1 RNP negative  RNP 70 negative  ESR 16 normal  CRP 0.34 borderline  CMP grossly normal  CBC grossly normal  Vit D 25.8 low    11/11/2022  ESR 31 elevated  CRP 0.69 elevated  CMP grossly normal  CBC grossly normal    08/2022  Vit D 24.4 low  ESR 13  CRP 0.59 borderline   RNA polymerase III 59 moderate positive  EVIN by IFA negative    04/2022  EVIN/EVIN panel negative  ESR 16 normal  CRP 0.43 borderline  RNA polymerase III 64 moderate positive  EVIN by IFA negative    02/2022  EVIN by IFA  CRISTELA Panel negative  CRP 0.54 elevated   ESR 16 normal   RNA virginia III 60 moderate positive     10/2021  ANCA negative (MPO/PR3) negative  ESR 15 normal   CRP 0.31  Cryoglobulin negative   SPEP grossly negative   CBC grossly normal   CMP grossly normal   IgA, IgG, IgM normal   Total complement normal   B12 525 normal  AVISE grossly negative with exception of equivocal RNA virginia III    04/2021  CK normal  C3 152 normal  C4 24.1 normal  dsDNA negative  SSA SSB negative  Smith moderate positive  Queen/RNP moderate positive  EVIN by IFA negative    08/2020  Vitamin D 32.5  B12 384  ESR 7 normal  CRP 0.37 borderline    04/2019  B2 G negative  RF negative  CCP negative  EBV IgG, AG ABS positive; IgA negative  MPO, KS-3, ANCA negative  SPEP grossly normal  Cryoglobulin negative  LAC negative  ACL negative  HIV negative  HCV nonreactive  Hepatitis B serologies normal    Maggie  DO Yossi  [facility]  09/04/2024

## 2024-09-05 PROBLEM — Z79.620 LONG-TERM USE OF ADALIMUMAB: Status: ACTIVE | Noted: 2024-09-05

## 2024-09-05 PROBLEM — Z86.69 HISTORY OF IRITIS: Status: ACTIVE | Noted: 2024-09-05

## 2024-09-05 PROBLEM — D84.821 IMMUNOCOMPROMISED STATE DUE TO DRUG THERAPY (HCC): Status: ACTIVE | Noted: 2024-09-05

## 2024-09-05 PROBLEM — Z79.899 IMMUNOCOMPROMISED STATE DUE TO DRUG THERAPY (HCC): Status: ACTIVE | Noted: 2024-09-05

## 2024-09-05 PROBLEM — H20.9 UVEITIS: Status: ACTIVE | Noted: 2024-09-05

## 2024-09-05 PROBLEM — Z71.6 ENCOUNTER FOR SMOKING CESSATION COUNSELING: Status: ACTIVE | Noted: 2024-09-05

## 2024-09-05 NOTE — PATIENT INSTRUCTIONS
-- continue methotrexate to 8 tabs once weekly  -- continue folic acid 1mg daily   -- continue adalimumab every 14 days  -- remember to hold meds if infection present  -- try to make appt with ENT  -- continue cyclobenzaprine as needed  -- follow up in 3 months or sooner as needed  -- attempt smoking cessation or at least cutting back as this is likely worsening your inflammation     Dr. Sy

## 2024-09-12 PROCEDURE — 93272 ECG/REVIEW INTERPRET ONLY: CPT | Performed by: INTERNAL MEDICINE

## 2024-09-13 ASSESSMENT — PATIENT HEALTH QUESTIONNAIRE - PHQ9
SUM OF ALL RESPONSES TO PHQ9 QUESTIONS 1 AND 2: 0
SUM OF ALL RESPONSES TO PHQ9 QUESTIONS 1 AND 2: 0
1. LITTLE INTEREST OR PLEASURE IN DOING THINGS: NOT AT ALL
2. FEELING DOWN, DEPRESSED OR HOPELESS: NOT AT ALL
CLINICAL INTERPRETATION OF PHQ2 SCORE: NO FURTHER SCREENING NEEDED

## 2024-09-18 ENCOUNTER — OFFICE VISIT (OUTPATIENT)
Dept: CARDIOLOGY | Age: 44
End: 2024-09-18

## 2024-09-18 VITALS
DIASTOLIC BLOOD PRESSURE: 87 MMHG | HEART RATE: 99 BPM | RESPIRATION RATE: 16 BRPM | BODY MASS INDEX: 34.89 KG/M2 | WEIGHT: 189.6 LBS | HEIGHT: 62 IN | SYSTOLIC BLOOD PRESSURE: 125 MMHG

## 2024-09-18 PROCEDURE — 99214 OFFICE O/P EST MOD 30 MIN: CPT | Performed by: INTERNAL MEDICINE

## 2024-09-18 RX ORDER — IBUPROFEN 200 MG
200 TABLET ORAL EVERY 6 HOURS PRN
COMMUNITY

## 2024-09-18 SDOH — HEALTH STABILITY: PHYSICAL HEALTH: ON AVERAGE, HOW MANY DAYS PER WEEK DO YOU ENGAGE IN MODERATE TO STRENUOUS EXERCISE (LIKE A BRISK WALK)?: 0 DAYS

## 2024-09-18 SDOH — HEALTH STABILITY: PHYSICAL HEALTH: ON AVERAGE, HOW MANY MINUTES DO YOU ENGAGE IN EXERCISE AT THIS LEVEL?: 0 MIN

## 2024-09-30 ENCOUNTER — PATIENT MESSAGE (OUTPATIENT)
Dept: RHEUMATOLOGY | Facility: CLINIC | Age: 44
End: 2024-09-30

## 2024-09-30 DIAGNOSIS — M25.50 POLYARTHRALGIA: Primary | ICD-10-CM

## 2024-09-30 RX ORDER — METHYLPREDNISOLONE 4 MG
TABLET, DOSE PACK ORAL
Qty: 1 EACH | Refills: 0 | Status: SHIPPED | OUTPATIENT
Start: 2024-09-30

## 2024-10-02 PROBLEM — D23.9 MULTIPLE DYSPLASTIC NEVI: Status: ACTIVE | Noted: 2024-10-02

## 2024-10-02 PROBLEM — D23.30: Status: ACTIVE | Noted: 2024-10-02

## 2024-10-02 PROBLEM — Z12.31 SCREENING MAMMOGRAM FOR BREAST CANCER: Status: ACTIVE | Noted: 2024-10-02

## 2024-10-02 PROBLEM — D23.9 MULTIPLE DYSPLASTIC NEVI: Status: RESOLVED | Noted: 2024-10-02 | Resolved: 2024-10-02

## 2024-10-02 PROBLEM — D25.9 FIBROID UTERUS: Status: RESOLVED | Noted: 2022-10-04 | Resolved: 2024-10-02

## 2024-10-02 PROBLEM — Z90.710 S/P LAPAROSCOPIC HYSTERECTOMY: Status: ACTIVE | Noted: 2024-10-02

## 2024-10-02 PROBLEM — Z90.721 S/P RIGHT OOPHORECTOMY: Status: ACTIVE | Noted: 2024-10-02

## 2024-10-02 PROBLEM — D21.9 FIBROIDS: Status: RESOLVED | Noted: 2022-10-04 | Resolved: 2024-10-02

## 2024-10-29 DIAGNOSIS — M25.50 POLYARTHRALGIA: ICD-10-CM

## 2024-10-29 DIAGNOSIS — M35.9 UNDIFFERENTIATED CONNECTIVE TISSUE DISEASE (HCC): ICD-10-CM

## 2024-10-29 DIAGNOSIS — H20.9 UVEITIS: ICD-10-CM

## 2024-10-29 RX ORDER — METHOTREXATE 2.5 MG/1
20 TABLET ORAL WEEKLY
Qty: 104 TABLET | Refills: 0 | Status: SHIPPED | OUTPATIENT
Start: 2024-10-29

## 2024-10-29 NOTE — TELEPHONE ENCOUNTER
LOV: 9/4/2024    RTC: 3 months   Future Appointments   Date Time Provider Department Center   11/13/2024  9:00 AM [facility] [place] [id number] [facility] [place]   11/27/2024  9:30 AM Yossi, Maggie, DO [facility]   4/9/2025  3:30 PM Yossi, Maggie, DO [facility]   7/9/2025  9:45 AM Yossi, Maggie, DO [facility]   LABS:   Component      Latest Ref Rng 7/23/2024   WBC      4.0 - 11.0 x10(3) uL 9.4    RBC      3.80 - 5.30 x10(6)uL 4.64    Hemoglobin      12.0 - 16.0 g/dL 14.4    Hematocrit      35.0 - 48.0 % 41.7    MCV      80.0 - 100.0 fL 89.9    MCH      26.0 - 34.0 pg 31.0    MCHC      31.0 - 37.0 g/dL 34.5    RDW-SD      35.1 - 46.3 fL 44.2    RDW      11.0 - 15.0 % 13.5    Platelet Count      150.0 - 450.0 10(3)uL 316.0    Prelim Neutrophil Abs      1.50 - 7.70 x10 (3) uL 5.26    Neutrophils Absolute      1.50 - 7.70 x10(3) uL 5.26    Lymphocytes Absolute      1.00 - 4.00 x10(3) uL 3.14    Monocytes Absolute      0.10 - 1.00 x10(3) uL 0.77    Eosinophils Absolute      0.00 - 0.70 x10(3) uL 0.09    Basophils Absolute      0.00 - 0.20 x10(3) uL 0.05    Immature Granulocyte Absolute      0.00 - 1.00 x10(3) uL 0.04    Neutrophils %      % 56.3    Lymphocytes %      % 33.6    Monocytes %      % 8.2    Eosinophils %      % 1.0    Basophils %      % 0.5    Immature Granulocyte %      % 0.4    Glucose      70 - 99 mg/dL 114 (H)    Sodium      136 - 145 mmol/L 141    Potassium      3.5 - 5.1 mmol/L 4.0    Chloride      98 - 112 mmol/L 112    Carbon Dioxide, Total      21.0 - 32.0 mmol/L 22.0    ANION GAP      0 - 18 mmol/L 7    BUN      9 - 23 mg/dL 16    CREATININE      0.55 - 1.02 mg/dL 0.79    BUN/CREATININE RATIO      10.0 - 20.0  20.3 (H)    CALCIUM      8.7 - 10.4 mg/dL 9.6    CALCULATED OSMOLALITY      275 - 295 mOsm/kg 294    EGFR      >=60 mL/min/1.73m2 95    ALT (SGPT)      10 - 49 U/L 20    AST (SGOT)      <34 U/L 16    ALKALINE PHOSPHATASE      37 - 98 U/L 76    Total Bilirubin      0.3 -  1.2 mg/dL 0.2 (L)    PROTEIN, TOTAL      5.7 - 8.2 g/dL 7.2    Albumin      3.2 - 4.8 g/dL 4.5    Globulin      2.0 - 3.5 g/dL 2.7    A/G Ratio      1.0 - 2.0  1.7    Patient Fasting for CMP? No    Anti-Adalimumab Ab      ng/mL <25    Adalimumab Lvl      ug/mL 17    C-REACTIVE PROTEIN      <1.00 mg/dL <0.40    SED RATE      0 - 20 mm/Hr 24 (H)       Legend:  (H) High  (L) Low    LAST REFILL: 7/26/2024, Quantity 104 tablets, Refills 0    Dr Sy-- orders pending, approve if agreeable.

## 2024-11-13 ENCOUNTER — HOSPITAL ENCOUNTER (OUTPATIENT)
Dept: MAMMOGRAPHY | Age: 44
Discharge: HOME | End: 2024-11-13
Attending: OBSTETRICS & GYNECOLOGY
Payer: COMMERCIAL

## 2024-11-13 ENCOUNTER — LAB ENCOUNTER (OUTPATIENT)
Dept: LAB | Facility: REFERENCE LAB | Age: 44
End: 2024-11-13
Attending: INTERNAL MEDICINE
Payer: COMMERCIAL

## 2024-11-13 DIAGNOSIS — D84.821 IMMUNOCOMPROMISED STATE DUE TO DRUG THERAPY (HCC): ICD-10-CM

## 2024-11-13 DIAGNOSIS — Z11.1 SCREENING FOR TUBERCULOSIS: ICD-10-CM

## 2024-11-13 DIAGNOSIS — Z79.899 IMMUNOCOMPROMISED STATE DUE TO DRUG THERAPY (HCC): ICD-10-CM

## 2024-11-13 DIAGNOSIS — Z79.899 HIGH RISK MEDICATION USE: ICD-10-CM

## 2024-11-13 DIAGNOSIS — Z12.31 SCREENING MAMMOGRAM FOR BREAST CANCER: ICD-10-CM

## 2024-11-13 DIAGNOSIS — M35.9 UNDIFFERENTIATED CONNECTIVE TISSUE DISEASE (HCC): ICD-10-CM

## 2024-11-13 LAB
ALBUMIN SERPL-MCNC: 4.8 G/DL (ref 3.2–4.8)
ALBUMIN/GLOB SERPL: 1.8 {RATIO} (ref 1–2)
ALP LIVER SERPL-CCNC: 83 U/L
ALT SERPL-CCNC: 24 U/L
ANION GAP SERPL CALC-SCNC: 6 MMOL/L (ref 0–18)
AST SERPL-CCNC: 19 U/L (ref ?–34)
BASOPHILS # BLD AUTO: 0.06 X10(3) UL (ref 0–0.2)
BASOPHILS NFR BLD AUTO: 1 %
BILIRUB SERPL-MCNC: 0.4 MG/DL (ref 0.3–1.2)
BUN BLD-MCNC: 13 MG/DL (ref 9–23)
BUN/CREAT SERPL: 15.5 (ref 10–20)
CALCIUM BLD-MCNC: 9.9 MG/DL (ref 8.7–10.4)
CHLORIDE SERPL-SCNC: 107 MMOL/L (ref 98–112)
CO2 SERPL-SCNC: 26 MMOL/L (ref 21–32)
CREAT BLD-MCNC: 0.84 MG/DL
CRP SERPL-MCNC: <0.4 MG/DL (ref ?–1)
DEPRECATED RDW RBC AUTO: 45.9 FL (ref 35.1–46.3)
EGFRCR SERPLBLD CKD-EPI 2021: 88 ML/MIN/1.73M2 (ref 60–?)
EOSINOPHIL # BLD AUTO: 0.04 X10(3) UL (ref 0–0.7)
EOSINOPHIL NFR BLD AUTO: 0.7 %
ERYTHROCYTE [DISTWIDTH] IN BLOOD BY AUTOMATED COUNT: 13.4 % (ref 11–15)
ERYTHROCYTE [SEDIMENTATION RATE] IN BLOOD: 14 MM/HR
FASTING STATUS PATIENT QL REPORTED: NO
GLOBULIN PLAS-MCNC: 2.7 G/DL (ref 2–3.5)
GLUCOSE BLD-MCNC: 114 MG/DL (ref 70–99)
HCT VFR BLD AUTO: 44.2 %
HGB BLD-MCNC: 15.2 G/DL
IMM GRANULOCYTES # BLD AUTO: 0.01 X10(3) UL (ref 0–1)
IMM GRANULOCYTES NFR BLD: 0.2 %
LYMPHOCYTES # BLD AUTO: 2.5 X10(3) UL (ref 1–4)
LYMPHOCYTES NFR BLD AUTO: 43.6 %
MCH RBC QN AUTO: 31.7 PG (ref 26–34)
MCHC RBC AUTO-ENTMCNC: 34.4 G/DL (ref 31–37)
MCV RBC AUTO: 92.1 FL
MONOCYTES # BLD AUTO: 0.34 X10(3) UL (ref 0.1–1)
MONOCYTES NFR BLD AUTO: 5.9 %
NEUTROPHILS # BLD AUTO: 2.78 X10 (3) UL (ref 1.5–7.7)
NEUTROPHILS # BLD AUTO: 2.78 X10(3) UL (ref 1.5–7.7)
NEUTROPHILS NFR BLD AUTO: 48.6 %
OSMOLALITY SERPL CALC.SUM OF ELEC: 289 MOSM/KG (ref 275–295)
PLATELET # BLD AUTO: 277 10(3)UL (ref 150–450)
POTASSIUM SERPL-SCNC: 3.9 MMOL/L (ref 3.5–5.1)
PROT SERPL-MCNC: 7.5 G/DL (ref 5.7–8.2)
RBC # BLD AUTO: 4.8 X10(6)UL
SODIUM SERPL-SCNC: 139 MMOL/L (ref 136–145)
WBC # BLD AUTO: 5.7 X10(3) UL (ref 4–11)

## 2024-11-13 PROCEDURE — 86480 TB TEST CELL IMMUN MEASURE: CPT | Performed by: INTERNAL MEDICINE

## 2024-11-13 PROCEDURE — 80053 COMPREHEN METABOLIC PANEL: CPT | Performed by: INTERNAL MEDICINE

## 2024-11-13 PROCEDURE — 86140 C-REACTIVE PROTEIN: CPT | Performed by: INTERNAL MEDICINE

## 2024-11-13 PROCEDURE — 85025 COMPLETE CBC W/AUTO DIFF WBC: CPT | Performed by: INTERNAL MEDICINE

## 2024-11-13 PROCEDURE — 77063 BREAST TOMOSYNTHESIS BI: CPT | Performed by: OBSTETRICS & GYNECOLOGY

## 2024-11-13 PROCEDURE — 77067 SCR MAMMO BI INCL CAD: CPT | Performed by: OBSTETRICS & GYNECOLOGY

## 2024-11-13 PROCEDURE — 85652 RBC SED RATE AUTOMATED: CPT | Performed by: INTERNAL MEDICINE

## 2024-11-15 LAB
M TB IFN-G CD4+ T-CELLS BLD-ACNC: 0.03 IU/ML
M TB TUBERC IFN-G BLD QL: NEGATIVE
M TB TUBERC IGNF/MITOGEN IGNF CONTROL: >10 IU/ML
QFT TB1 AG MINUS NIL: -0.03 IU/ML
QFT TB2 AG MINUS NIL: -0.03 IU/ML

## 2024-11-19 ENCOUNTER — TELEPHONE (OUTPATIENT)
Dept: RHEUMATOLOGY | Facility: CLINIC | Age: 44
End: 2024-11-19

## 2024-11-19 DIAGNOSIS — M25.50 POLYARTHRALGIA: ICD-10-CM

## 2024-11-19 DIAGNOSIS — M35.9 UNDIFFERENTIATED CONNECTIVE TISSUE DISEASE (HCC): Primary | ICD-10-CM

## 2024-11-19 RX ORDER — METHYLPREDNISOLONE 4 MG/1
TABLET ORAL
Qty: 1 EACH | Refills: 0 | Status: SHIPPED | OUTPATIENT
Start: 2024-11-19

## 2024-11-19 NOTE — TELEPHONE ENCOUNTER
Spoke to patient and she stated that her LFT shoulder was bothering her before and she took steroids for her and it helped. She states that the base of her thumb and her RT wrist has pain and it is radiating up her arm and it is maddening. She states that yes, she would like to try a short course of steroids and then she will see  on 11/27.

## 2024-11-20 NOTE — TELEPHONE ENCOUNTER
Left message for patient regarding the below message:    Medrol dose pack ordered.    Maggie Sy,   [facility]  11/19/2024     Patient was asked to call the office with any questions she may have.

## 2024-11-27 ENCOUNTER — TELEPHONE (OUTPATIENT)
Dept: RHEUMATOLOGY | Facility: CLINIC | Age: 44
End: 2024-11-27

## 2024-11-27 ENCOUNTER — OFFICE VISIT (OUTPATIENT)
Dept: RHEUMATOLOGY | Facility: CLINIC | Age: 44
End: 2024-11-27
Payer: COMMERCIAL

## 2024-11-27 VITALS
OXYGEN SATURATION: 95 % | TEMPERATURE: 97 F | RESPIRATION RATE: 18 BRPM | HEIGHT: 61 IN | WEIGHT: 193.63 LBS | DIASTOLIC BLOOD PRESSURE: 88 MMHG | HEART RATE: 75 BPM | SYSTOLIC BLOOD PRESSURE: 130 MMHG | BODY MASS INDEX: 36.56 KG/M2

## 2024-11-27 DIAGNOSIS — M25.50 POLYARTHRALGIA: ICD-10-CM

## 2024-11-27 DIAGNOSIS — Z79.899 HIGH RISK MEDICATION USE: ICD-10-CM

## 2024-11-27 DIAGNOSIS — D84.821 IMMUNOCOMPROMISED STATE DUE TO DRUG THERAPY (HCC): ICD-10-CM

## 2024-11-27 DIAGNOSIS — R53.82 CHRONIC FATIGUE: ICD-10-CM

## 2024-11-27 DIAGNOSIS — E55.9 VITAMIN D DEFICIENCY: ICD-10-CM

## 2024-11-27 DIAGNOSIS — Z79.899 IMMUNOCOMPROMISED STATE DUE TO DRUG THERAPY (HCC): ICD-10-CM

## 2024-11-27 DIAGNOSIS — H20.9 UVEITIS: ICD-10-CM

## 2024-11-27 DIAGNOSIS — M35.9 UNDIFFERENTIATED CONNECTIVE TISSUE DISEASE (HCC): Primary | ICD-10-CM

## 2024-11-27 DIAGNOSIS — E61.1 IRON DEFICIENCY: ICD-10-CM

## 2024-11-27 DIAGNOSIS — M75.02 ADHESIVE CAPSULITIS OF LEFT SHOULDER: ICD-10-CM

## 2024-11-27 DIAGNOSIS — R00.2 PALPITATIONS: ICD-10-CM

## 2024-11-27 DIAGNOSIS — Z79.620 LONG-TERM USE OF ADALIMUMAB: ICD-10-CM

## 2024-11-27 DIAGNOSIS — M79.10 MYALGIA: ICD-10-CM

## 2024-11-27 PROCEDURE — 99214 OFFICE O/P EST MOD 30 MIN: CPT | Performed by: INTERNAL MEDICINE

## 2024-11-27 PROCEDURE — 3008F BODY MASS INDEX DOCD: CPT | Performed by: INTERNAL MEDICINE

## 2024-11-27 PROCEDURE — 3075F SYST BP GE 130 - 139MM HG: CPT | Performed by: INTERNAL MEDICINE

## 2024-11-27 PROCEDURE — 3079F DIAST BP 80-89 MM HG: CPT | Performed by: INTERNAL MEDICINE

## 2024-11-27 PROCEDURE — G2211 COMPLEX E/M VISIT ADD ON: HCPCS | Performed by: INTERNAL MEDICINE

## 2024-11-27 NOTE — PROGRESS NOTES
?  RHEUMATOLOGY FOLLOW UP   Date of visit: 11/27/2024    Chief Complaint   Patient presents with    Follow - Up     LOV:09/04/24. \"Feeling crappy\" Complains of left shoulder pain and right knee pain.      ?  ASSESSMENT, DISCUSSION & PLAN   Assessment:  1. Undifferentiated connective tissue disease (HCC)    2. Long-term use of adalimumab    3. Immunocompromised state due to drug therapy (HCC)    4. Polyarthralgia    5. Chronic fatigue    6. Uveitis    7. Palpitations    8. High risk medication use    9. Iron deficiency    10. Myalgia    11. Vitamin D deficiency    12. Adhesive capsulitis of left shoulder          Discussion:  Ms. Amelie Gonzalez is a 44 y with a plethora of complaints.  It seems that she was previously diagnosed with potentially lupus given Smith antibody positivity, however her EVIN was normal, complements normal as well as inflammatory marker were normal.  Patient does have a history of livedo reticularis and possible Raynaud's.  She also complains of rashes, oral/nasal ulcerations, joint pain with swelling as well as multisystem complaints.  It seems like she was previously put on Plaquenil and had facial rash, however unclear if rash was connected to medication itself.  She lacks any overt signs of synovitis on her exam today.  Her labs were grossly negative for autoimmune etiology to her livedo and Raynaud's. She had positive RNA virginia III testing but EVIN and otherwise negative workup.  She was seen by rheumatology at - said unlikely scleroderma. Recommended hand US if swelling recurs. Knee xrays consistent with mild arthritis. Recommended ophthalmology exam and to have schirmer testing done, would consider salivary lip biopsy vs parotid US for eval for Sjogren's. Strongly recommended IBD evaluation     She was previously seen by GI and colonoscopy was negative for any active colitis, endoscopy was consistent with gastritis.  She was given Xifaxan for IBS D which has improved her nausea.  Feels  like her diarrhea has improved after the last set of steroids that I ordered.  In terms of neurology, she did undergo spinal tap and was told that testing was negative for multiple sclerosis.  Unclear if there has been any etiology found to her small fiber neuropathy.    She was started on Humira since her last visit due to worsened iritis/uveitis. 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  At this time, she still has some left shoulder pain, right knee pain and was having right thumb pain.  She still requires intermittent Medrol Dosepaks to help.  Strongly recommended that we step up immunosuppression.  She was not able to tolerate an increase in oral Otrexup from 6 tablets to 8 tablets so we will have staff work on trying to get injectable methotrexate approved.  Would like to avoid continued intermittent steroids as often as patient is getting on.  Also strongly recommended that she work on smoking cessation send this is likely contributing to her difficult to control autoimmune disease.  Also recommended intra-articular steroid injection for the left shoulder frozen shoulder.  Will try to get her added onto my schedule versus seeing orthopedics for it.  Also recommended physical therapy which she will consider after the new year.    -- continue methotrexate 6 tabs once weekly -- will try to get insurance to coverage for injectable methotrexate   -- continue folic acid 1mg daily   -- continue adalimumab every 14 days  -- remember to hold meds if infection present  -- start PT for the shoulder   -- will get you back on schedule for left shoulder injection vs see orthopedics   -- continue cyclobenzaprine as needed  -- follow up in 3 months or sooner as needed  -- attempt smoking cessation or  at least cutting back as this is likely worsening your inflammation   -- get updated labs and will include antibodies against the adalimumab similar to what we checked in August.  If positive, will need to change biologic therapy.  -- consider re-evaluation by GI due to the worsened constipation    Follow-up in 3 to 4 months or sooner as needed  Encouraged to reach out if symptoms change or worsen in the meantime    Humira is an injectable TNF-alpha inhibitor, which is a humanized monoclonal biologic medication used in the treatment of uveitis. This is a potent immunosuppressant medication, and as such runs the risk of increased susceptibility to infections. Based on data analysis, this is most commonly respiratory infections, but can also include all types of infection, including reactivation of latent tuberculosis if present. As such, we recommend aggressive screening for tuberculosis as well as viral hepatitis. This medication also comes with a black box warning for the possibility of maligancy. It is unclear whether this is a true causal relationship, as many of the inflammatory diseases this agent is used in have an increased risk of malignancy as well.  As with any of the biologic medications, there is always risk of allergic reaction, including anaphylaxis. We discussed these risks, and it was agreed that the benefits outweigh the possible risks of medication usage.     We previously discussed the risk and benefit of methotrexate therapy, including the possibility of liver toxicity. We will monitor her liver functions in every month for the first couple months followed by every three months thereafter, we also discussed that pulmonary hypersensitivity reactions may have been at times, including lung toxicities and she was educated on signs and symptoms such as fever, persistent cough, and shortness of breath.Due to potential lung toxicity with this medication, baseline CXR should be on file.  There is also a  risk of depression of the blood cell count. There is an increased risk of infection with this medication as well, particularly respiratory infections, we discussed this at length. We also discussed the risk of GI upset and the development of oral ulcers. It was explained in detail that folic acid was needed daily to help avoid many of these side effects.  Methotrexate is also an abortifacient medication with severe teratogenicity. This was discussed in detail as a risk. Should pregnancy occur, this medication would be extremely toxic to the fetus.  While on methotrexate, sulfa based drugs including but not limited to Bactrim, should be avoided due to potential toxicity.     She previously tried plaquenil (tried 2 different instances) but had rash/allergic reaction both times.   She may benefit from a low dose gabapentin vs low dose prednisone as recommended by neuro. Will consider this after monitoring her response to getting inflammation under control     Patient verbalized understanding of above instructions. No further questions at this time.    Code selection for this visit was based on time spent (32min) on date of service in preparing to see the patient, obtaining and/or reviewing separately obtained history, performing a medically appropriate examination, counseling and educating the patient/family/caregiver, ordering medications or testing, referring and communicating with other healthcare providers, documenting clinical information in the E HR, independently interpreting results and communicating results to the patient/family/caregiver and care coordination with the patient's other providers. In addition to multiple mychart messages prior to today's visit.   ?  Plan:  Diagnoses and all orders for this visit:    Undifferentiated connective tissue disease (HCC)  -     Physical Therapy Referral - [facility]  -     Adalimumab Activity and Neutralizing AB; Future    Long-term use of adalimumab  -      Adalimumab Activity and Neutralizing AB; Future    Immunocompromised state due to drug therapy (HCC)  -     Adalimumab Activity and Neutralizing AB; Future    Polyarthralgia  -     CK (Creatine Kinase) (Not Creatinine); Future  -     Vitamin B12; Future  -     Iron And Tibc; Future  -     Ferritin; Future  -     Vitamin D; Future  -     TSH and Free T4 [E]; Future    Chronic fatigue  -     CK (Creatine Kinase) (Not Creatinine); Future  -     Vitamin B12; Future  -     Iron And Tibc; Future  -     Ferritin; Future  -     Vitamin D; Future  -     TSH and Free T4 [E]; Future    Uveitis    Palpitations    High risk medication use    Iron deficiency  -     CK (Creatine Kinase) (Not Creatinine); Future  -     Vitamin B12; Future  -     Iron And Tibc; Future  -     Ferritin; Future  -     Vitamin D; Future  -     TSH and Free T4 [E]; Future    Myalgia  -     CK (Creatine Kinase) (Not Creatinine); Future  -     Vitamin B12; Future  -     Iron And Tibc; Future  -     Ferritin; Future  -     Vitamin D; Future  -     TSH and Free T4 [E]; Future    Vitamin D deficiency  -     Vitamin D; Future    Adhesive capsulitis of left shoulder  -     Physical Therapy Referral - [facility]                    Return in about 3 months (around 2/27/2025).  ?  HPI   Amelie Gonzalez is a 44 year old female with the following active problems who was seen as a new patient for evaluation of pain and fatigue.  She presents for follow up today.     Since her last visit, she has been doing okay.  Did suffer a slight flare a few weeks ago- having left shoulder pain, right knee pain and right thumb pain. Felt swelling and pain and difficulty moving the right hand d/t the pain.   Had xray through immediate care for the left shoulder- given dose pack with some relief. Is going through chiropractor but still having some restriction in ROM particularly with reaching behind and overhead. Having some trouble with getting jacket off and washing.   Some  increased right knee pain. If swelling present, now better.   Has taken two medrol dose pack    Tried to increase methotrexate to 8 from the 6 but could not tolerate d/t GI upset.  Feels more constipation, not sure if medication related but has had to use enema to help have a movement when miralax not helping.   Has not seen GI in a few years.  Has some cramping without significant diarrhea since being on the adalimumab.     Had to switch per insurance to Hyrimoz from her Humira in the spring, doesn't feel helping as much as Humira but tolerating.    Was off medication for month of August and then had to come off due to dermatologic procedure on forehead. No recent formal cancer- typically atypical dyplasia.   Did have a few flares of iritis when off medication. But no recent flares since being back on adalimumab.     Some increased myalgias in the legs   Still with intermittent palpitations. Prior workup negative (heart monitor, EKG, ECHO)     + dry eyes, just using artifical tears. Hasn't started xiidra if the dryness worsens, allergic to plugs previously over 10 years ago.   + intermittent dry mouth   + some increased oral sores when having other flares in the fall     Denies skin rashes  Still smoking 3/4 ppd.   Denies blood in the stools.   Still with reflux/GERD- has switched various PPIs in the past. Has not gotten refill from GI due to lack of follow up. Has been doing OTC nexium which she feels helping more than omeprazole   + livedo reticularis, stable and intermittent   Still under stress- work and mother with recent dx of breast cancer and suffered fall.       Previously seen by [facility] for second opinion to be evaluated for scleroderma- was told unlikely scleroderma but recommended that she be monitored for lupus and should get further work up for IBD.       HPI from initial consultation  referred for rheumatologic evaluation due to second opinion.      States she first started to have  problems about 2018. Thought she was getting lymph nodes enlarged in the neck. A rash on the forehead that looked like blisters. Both resolved on it's own.   Then developed livedo reticularis appearance over the legs.   Was seen by rheumatology and initially labs were normal so no intervention started initially.    Has been suffering from GI issues- abdominal pain/cramping, diarrhea without blood in stools, nausea without vomiting. States she has been told colitis in the past. Has been chronic over the past few years.     Also rash under her breasts that lasted several months. Eventually went away on it's own.     After covid vaccination in Feb (2021), had worsened pain in her face. Has had repeat oral ulcerations. Has been seen by ENT and dentist. Had biopsy and told possible lichen planus. Has tried different ointments as well as mouth washes without improvement.     Had worsened hand swelling. When she followed with rheumatology again, her Queen antibody was positive but EVIN negative. She was started on plaquenil, and unclear if face rash started due to medication since rash still present intermittently. Happens randomly per pt, not necessarily with sun exposure.     Had worsened abdominal pain and dx with liver lesion, underwent surgical removal on 10/11. Does have improvement of the abdominal pain. Diagnosed with Von Meyenburg complex    + hx of sepsis from renal stones (2014)  + hair loss/thinning  + can get ulcer in the nose   + pain located in wrists b/l (was wearing braces in August), b/l knees. Can get swelling in the hand and knees. Swelling diffuse over the hands, not just over the joints.   + hx of one miscarriage at 13w, able to conceive after that. No other pregnancy complications.   + GERD on medication, controlled   + trouble swallowing, had swallow study done last year and full esophagram was normal   + dry eyes, uses artifical tears and gel at night, has tried plugs before but had allergic  reaction to the material.  + dry mouth, not severe. No recurrent cavities.   + achilles pain with sometimes swelling, particularly when first getting up in the mornings   + abnormal fingernails- ridging and peeling   + possible hx of dactylitis about 2019- left ring finger; no recurrence since that time but has residual itching.   + chronic sinus disease; epistaxis in the arcos  + chronic cough  + smoker since age 15; has wellbutrin to try to help quit    The patient denies elevated or scarring rashes, Raynaud's phenomenon, prior hematologic abnormality, prior renal disease (aside from the kidney stones), or history of seizures.  No history of prior blood clot in the legs or lungs, strokes or ischemic phenomenon.  Denies nonhealing ulcers on the fingertips.   The patient denies any history of uveitis, nodular painful shin bruises, psoriatic lesions, or history of dactylitis.    Works as opto technician.     Family hx:   Father with psoriatic arthritis on methotrexate and prednisone  Twin sister (identical) getting worked up for psoriatic arthritis vs mctd, has jo1+      Past Medical History:  Past Medical History:    Abdominal distention    Abdominal hernia    ABDOMINAL PAIN    mild cramping    Abdominal pain    Anxiety    Arthritis    Atypical mole    Back pain    Bad breath    Belching    Benign neoplasm of rectum    Bloating    Blood in the stool    Calculus of kidney    Cardiomegaly    CERVICAL DYSPLASIA    Change in hair    Chest pain    Constipation    Decorative tattoo    DEPRESSION    Diabetes mellitus (HCC)    gestational    Diarrhea, unspecified    Endometriosis    Enlarged lymph node    Fatigue    Flatulence/gas pain/belching    Food intolerance    Frequent urination    Frequent UTI    GERD    GERD (gastroesophageal reflux disease)    Gestational diabetes (HCC)         Headache disorder    Hearing loss    Heartburn    Hemorrhoids    History of depression    IBS (irritable bowel syndrome)    Indigestion     Irregular bowel habits    Kidney disease    Leaking of urine    Migraines    Mouth sores    Nausea    Night sweats    OBESITY    Pain in joints    Pain with bowel movements    Painful swallowing    PONV (postoperative nausea and vomiting)    Problems with swallowing    Rash    Skin blushing/flushing    Sleep disturbance    Stool incontinence    Thyroid nodule    Ulcer     Past Surgical History:  Past Surgical History:   Procedure Laterality Date       and     Cholecystectomy      Colonoscopy      Colposcopy,bx cervix/endocerv curr  2003    moderate dysplasia    Cysto/uretero w/lithotripsy Right 2014    Procedure: LITHOTRIPSY HOLMIUM LASER WITH CYSTOSCOPY;  Surgeon: Thierno Tovar DO;  Location: [facility]    Cystouretro w/stone remove Right 2014    Procedure: CYSTOSCOPY WITH RETROGRADE PYELOGRAM;  Surgeon: Thierno Tovar DO;  Location: [facility]    D & c  2012    [facility]      Fragmenting of kidney stone Right 2014    Procedure: LITHOTRIPSY WITH CYSTOSCOPY, STENT PLACEMENT;  Surgeon: Thierno Tovar DO;  Location: [facility]    Hysterectomy  10/2022    Leep  2003    Lysis of adhesions      Desiree localization wire 1 site right (cpt=19281)      age 26 had Right biopsyexciosnal; fibrocystic tissue    Needle biopsy liver      Other surgical history      Rt breast biopsy    Other surgical history  2014    Right ESWL- Dr. Webb    Other surgical history  2014    Right ESWL- Dr. Tovar    Other surgical history  2014    Cysto Stent Removal- Dr. Tovar    Tonsillectomy  1990    Total abdom hysterectomy      X-ray retrograde pyelogram Right 2014    Procedure: CYSTOSCOPY/URETEROSCOPY/STONE EXTRACTION;  Surgeon: Thierno Tovar DO;  Location: [facility]     Family History:  Family History   Problem Relation Age of Onset    Other (psoriatic arthritis) Father     Colon Polyps  Mother     Lipids Mother     Heart Disorder Mother     Hypertension Mother     Diabetes Mother     Breast Cancer Mother 70    Cancer Mother     Colon Polyps Sister     Other (psoriatic arthritis) Sister     Other (mctd) Sister     Ovarian Cancer Paternal Aunt     Colon Polyps Sister      Social History:  Social History     Socioeconomic History    Marital status:    Tobacco Use    Smoking status: Every Day     Current packs/day: 1.00     Average packs/day: 1 pack/day for 15.0 years (15.0 ttl pk-yrs)     Types: Cigarettes    Smokeless tobacco: Never   Vaping Use    Vaping status: Never Used   Substance and Sexual Activity    Alcohol use: Not Currently    Drug use: No    Sexual activity: Yes     Partners: Male     Social Drivers of Health     Physical Activity: High Risk (9/18/2024)    Received from [facility]    Exercise Vital Sign     On average, how many days per week do you engage in moderate to strenuous exercise (like a brisk walk)?: 0 days     On average, how many minutes do you engage in exercise at this level?: 0 min    [facility]     Medications:  Outpatient Medications Marked as Taking for the 11/27/24 encounter (Office Visit) with Maggie Sy, DO   Medication Sig Dispense Refill    METHOTREXATE 2.5 MG Oral Tab TAKE 8 TABLETS (20 MG TOTAL) BY MOUTH ONCE A WEEK. (Patient taking differently: Take 8 tablets (20 mg total) by mouth once a week. Takes 6 tab) 104 tablet 0    desvenlafaxine  MG Oral Tablet 24 Hr Take 1 tablet (100 mg total) by mouth daily.      Esomeprazole Magnesium 20 MG Oral Capsule Delayed Release Take 1 capsule (20 mg total) by mouth every morning before breakfast.      fluticasone propionate 50 MCG/ACT Nasal Suspension 2 sprays by Nasal route daily.      loratadine 10 MG Oral Tab Take 1 tablet (10 mg total) by mouth daily.      folic acid 1 MG Oral Tab Take 1 tablet (1 mg total) by mouth daily. 90 tablet 0     Adalimumab-adaz (HYRIMOZ) 40 MG/0.4ML Subcutaneous Solution Auto-injector INJECT 1 PEN UNDER THE SKIN EVERY 14 DAYS 2 each 3    cyclobenzaprine 5 MG Oral Tab Take 1 tablet (5 mg total) by mouth 2 (two) times daily as needed for Muscle spasms. 60 tablet 0    hydrocortisone 2.5 % External Ointment       olopatadine 0.1 % Ophthalmic Solution INSTILL 1 DROP BY OPHTHALMIC ROUTE 2 TIMES EVERY DAY INTO BOTH EYES AS NEEDED      hyoscyamine 0.125 MG Sublingual SL Tab Place 1 tablet (125 mcg total) under the tongue every 6 (six) hours as needed for Cramping. 120 tablet 0    Albuterol Sulfate  (90 Base) MCG/ACT Inhalation Aero Soln Inhale 2 puffs into the lungs every 6 (six) hours as needed.      LOPERAMIDE HCL OR Take by mouth as needed.       Modified Medications    No medications on file     There are no discontinued medications.    ?  ?  Allergies:  Allergies   Allergen Reactions    Aluminum RASH and OTHER (SEE COMMENTS)     Ulcers on skin    Azithromycin ANGIOEDEMA and SWELLING    Biaxin [Clarithromycin] TONGUE SWELLING    Clarithromycin ANGIOEDEMA, SWELLING and TONGUE SWELLING     Tongue swells and develops sores in mouth  Tongue swells    Tongue swells and develops sores in mouth    Duloxetine MYALGIA and OTHER (SEE COMMENTS)     Vomiting and jaw lock    Vomiting and jaw lock  Other reaction(s): Myalgias (muscle pain)  Vomiting and jaw lock    Erythromycin ANGIOEDEMA and SWELLING    Nickel OTHER (SEE COMMENTS) and RASH     Skin ulcer.    Skin ulcer.  Skin ulcer.    Nickel RASH     Also reaction to aluminum- enviornmental      Silver UNKNOWN    Hydroxychloroquine RASH     ?  REVIEW OF SYSTEMS   ?  Review of Systems   Constitutional:  Positive for malaise/fatigue. Negative for chills, fever and weight loss.   Eyes:  Positive for blurred vision, double vision and photophobia. Negative for pain and redness.   Respiratory:  Positive for cough. Negative for hemoptysis and shortness of breath.    Cardiovascular:   Positive for chest pain (told costochondritis) and palpitations. Negative for leg swelling.   Gastrointestinal:  Positive for constipation and heartburn. Negative for abdominal pain, blood in stool, diarrhea and nausea.   Genitourinary:  Positive for urgency. Negative for dysuria, frequency and hematuria.   Musculoskeletal:  Positive for back pain, joint pain (but some hand swelling), myalgias and neck pain.   Skin:  Negative for itching and rash.   Neurological:  Positive for dizziness (intermittently- more lightheaded) and headaches (intermittently). Negative for tingling and weakness.   Endo/Heme/Allergies:  Positive for environmental allergies. Bruises/bleeds easily.   Psychiatric/Behavioral:  Positive for depression. The patient is nervous/anxious and has insomnia.         Feeling better overall      PHYSICAL EXAM   Today's Vitals:  Temperature Blood Pressure Heart Rate Resp Rate SpO2   Temp: 97.3 °F (36.3 °C) BP: 130/88 Pulse: 75 Resp: 18 SpO2: 95 %   ?  Current Weight Height BMI BSA Pain   Wt Readings from Last 1 Encounters:   11/27/24 193 lb 9.6 oz (87.8 kg)    Height: 5' 1\" (154.9 cm) Body mass index is 36.58 kg/m². Body surface area is 1.86 meters squared. Pain Score: 1 - (Mild)       Physical Exam  Vitals and nursing note reviewed.   Constitutional:       General: She is not in acute distress.     Appearance: Normal appearance. She is well-developed. She is not diaphoretic.   HENT:      Head: Normocephalic.   Eyes:      General: No scleral icterus.     Extraocular Movements: Extraocular movements intact.      Conjunctiva/sclera: Conjunctivae normal.   Neck:      Vascular: No JVD.      Trachea: No tracheal deviation.   Cardiovascular:      Rate and Rhythm: Normal rate and regular rhythm.      Heart sounds: No murmur heard.  Pulmonary:      Effort: Pulmonary effort is normal. No respiratory distress.      Breath sounds: Normal breath sounds. No wheezing.   Musculoskeletal:         General: Tenderness  present. No swelling.      Cervical back: Neck supple.      Comments: Diffuse tenderness- particularly over Mcps and dorsum of hands and b/l wrist - improved overall  No swelling, redness or restriction of motion of the DIPs, PIPs, MCPs, wrists, elbows, ankles, or joints of the feet except right mid foot  Post surgical changes noted over distal right foot  Puffiness over index/middle fingers MCPs to PIPs but not over joints.   Right shoulder grossly normal. Left shoulder with negative mg/job's but difficulty putting in crass or even modified crass position  Bilateral knees with medial joint line tenderness, no crepitus, no effusion.  Medial distal knee tenderness- stable   Lymphadenopathy:      Cervical: No cervical adenopathy.   Skin:     General: Skin is warm and dry.      Findings: No erythema or rash.      Comments: No periungal erythema   No skin tightening noted over fingers/hands  No malar rash  Livedo reticularis    Neurological:      Mental Status: She is alert and oriented to person, place, and time.      Cranial Nerves: No cranial nerve deficit.      Gait: Gait normal.   Psychiatric:         Mood and Affect: Mood normal.         Behavior: Behavior normal.       ?  Radiology review:       MRI's reviewed and to be scanned into chart.       PROCEDURE: CT ABDOMEN TRIPHASIC LIVER (W+WO)(CPT=74170)       COMPARISON: [facility], CT APPENDIX ABD PEL W CONTRAST (OVO=[id number]), 9/10/2021, 1:56 PM.  [facility], MRI LIVER (W+WO) (CPT=74183), 8/25/2021, 12:47 PM.       INDICATIONS: History of cystic liver lesion, status post surgical resection of segment VIII (10/11/2021).       TECHNIQUE: Multidetector CT images of the abdomen were obtained without and with non-ionic intravenous contrast material (in the arterial and portal venous phases). Automated exposure control for dose reduction was used. Adjustment of the mA and/or kV   was done based on the patient's size.  Iterative reconstruction technique for dose reduction was employed. Water was ingested immediately prior to image acquisition; no oral contrast was ingested.       FINDINGS:   LUNG BASES: The heart is normal in size. There is dependent subsegmental atelectasis bilaterally.   LIVER: Postoperative changes are suggested with demonstration of a 2.7 x 1.9 x 3.6 cm residual hypodensity in segment VIII, intervally decreased from 7.6 x 6.9 x 6.5 cm previously; this has lobulated contours. Overlying capsular retraction is seen   extending to the right hemidiaphragmatic leaflet. No definite enhancement is identified.   Additional well-circumscribed cystic lesions are present, not significantly changed from prior exams.   BILIARY: The gallbladder is surgically absent with cholecystectomy clips in the gallbladder fossa. Extrahepatic biliary dilatation measures up to 0.8 cm.   PANCREAS: No lesion, fluid collection, ductal dilatation, or atrophy.     SPLEEN: No enlargement.     ADRENALS:   No defined mass or abnormal enlargement.     KIDNEYS:   Bilateral renal calculi are apparent. No ureteral calculi are identified in the imaged volume. There is no hydronephrosis or hydroureter. No perinephric or periureteric fat stranding is appreciated. Lobulated renal contours are seen,   suggestive of multifocal renal cortical scarring. Multiple well circumscribed renal hypodensities are present and are not completely characterized, but statistically likely represent cysts. No suspicious enhancing renal lesions are evident. The   visualized portions of the ureters are normal in course and caliber without visualized filling defects on excretory phase imaging.   GI/MESENTERY: A small hiatal hernia is evident. Mild mucosal enhancement of the terminal ileum is suggested. There is no evidence of bowel obstruction. A normal caliber appendix is seen without inflammatory manifestations.     VASCULATURE:   No aneurysm is detected.   RETROPERITONEUM:  No mass or lymphadenopathy is apparent.     BONES:   Multilevel degenerative changes are seen throughout the spine.   ABDOMINAL WALL: There is a minute fat-containing umbilical hernia superimposed on slight periumbilical diastasis.   OTHER: No free air or fluid is seen in the abdomen.                         Impression   CONCLUSION:   1. Postprocedural changes of partial cyst resection in segment VIII. The previously seen cystic lesion has partially involuted. No suspicious features are identified.       2. Additional scattered cystic hepatic lesions appear grossly stable.       3. Nonspecific mild mucosal enhancement of the terminal ileum, perhaps indicative of underlying ileitis. Correlation for relevant symptomatology is suggested.       4. Bilateral renal calculi are seen without evidence of obstructive uropathy.       5. Renal cortical defects, particular on the right, may reflect renal cortical scarring.       6. Status post cholecystectomy with mild extrahepatic biliary dilatation, perhaps related to the postcholecystectomy state.       7. Lesser incidental findings as above.         Dictated by (CST): Reinaldo Wilkes MD on 12/01/2021 at 2:44 PM       Finalized by (CST): Reinaldo Wilkes MD on 12/01/2021 at 2:56 PM             PROCEDURE: MRI LIVER (W+WO) (CPT=74183)       COMPARISON: None.       INDICATIONS: Liver cyst. Follow up.       TECHNIQUE: A comprehensive MRI examination of the abdomen was performed utilizing a variety of imaging planes and imaging parameters to optimize visualization of suspected pathology.  Images were obtained both before and after intravenous gadolinium   injection.         FINDINGS:   LIVER: There is a lobulated T2 hyperintense lesion which appears to be centered in the hepatic dome segment 8 measuring 7.0 x 7.6 x 5.8 cm. This may have some thin internal septation but otherwise no evidence of significant enhancement.  There are   multiple additional scattered smaller cysts seen  throughout the liver.  There is a 0.7 cm T2 hyperintense lesion in the left lobe segment 2. This demonstrates homogeneous arterial enhancement which persists on delayed sequences and most likely reflects a    small flash filling meningioma.   GALLBLADDER: Gallbladder surgically absent.   BILIARY TREE: Common bile duct measures 0.8 cm with distal gradual tapering.  No intrahepatic biliary ductal dilatation.  There is low insertion of a cystic duct remanent noted which is not significantly dilated.   PANCREAS: Unremarkable.   SPLEEN: Unremarkable.   ADRENALS: Unremarkable.   KIDNEYS: Enhance symmetrically without hydronephrosis.  Cortical scarring noted in the interpolar right kidney.  A subcentimeter right lower pole renal cyst noted.   VASCULATURE:             Abdominal aorta is normal in caliber.  The main portal vein is patent.   LYMPHADENOPATHY: None.     BOWEL: Visualized bowel is nondilated.   BONES: Suboptimally evaluated but marrow signal is grossly unremarkable.   LUNG BASES: No significant finding.   OTHER: Negative.                     Impression   CONCLUSION:       7.6 cm cyst hepatic dome as described above with additional smaller scattered hepatic cysts.       Probable subcentimeter left hepatic flash filling hemangioma.       Cholecystectomy.       Mildly dilated common bile duct with distal gradual tapering.  Findings may reflect post cholecystectomy change.  Advise appropriate laboratory correlation.       Small right renal cyst.               Dictated by (CST): Manny Sanchez MD on 8/25/2021 at 1:50 PM       Finalized by (CST): Manny Sanchez MD on 8/25/2021 at 2:12 PM        HISTORY: Persistent cough for 2 months.  Occasional shortness of breath.     TECHNIQUE:  2 view chest.     COMPARISON: 9/6/2014.     FINDINGS/   IMPRESSION:   Equivocal minor peribronchial thickening.  No discrete focal airspace consolidation or pleural effusion.     No overt enlargement of the cardiac silhouette.      FINAL REPORT   Attending Radiologist:  Maverick Irving MD   Date Signed Off:  12/26/2020 16:18          Labs:  Lab Results   Component Value Date    WBC 5.7 11/13/2024    RBC 4.80 11/13/2024    HGB 15.2 11/13/2024    HCT 44.2 11/13/2024    .0 11/13/2024    MCV 92.1 11/13/2024    MCH 31.7 11/13/2024    MCHC 34.4 11/13/2024    RDW 13.4 11/13/2024    NEPRELIM 2.78 11/13/2024    NEPERCENT 48.6 11/13/2024    LYPERCENT 43.6 11/13/2024    MOPERCENT 5.9 11/13/2024    EOPERCENT 0.7 11/13/2024    BAPERCENT 1.0 11/13/2024    NE 2.78 11/13/2024    LYMABS 2.50 11/13/2024    MOABSO 0.34 11/13/2024    EOABSO 0.04 11/13/2024    BAABSO 0.06 11/13/2024     Lab Results   Component Value Date     (H) 11/13/2024    BUN 13 11/13/2024    BUNCREA 15.5 11/13/2024    CREATSERUM 0.84 11/13/2024    ANIONGAP 6 11/13/2024    GFR >59 12/04/2010    GFRNAA 93 08/01/2022    GFRAA 108 08/01/2022    CA 9.9 11/13/2024    OSMOCALC 289 11/13/2024    ALKPHO 83 11/13/2024    AST 19 11/13/2024    ALT 24 11/13/2024    BILT 0.4 11/13/2024    TP 7.5 11/13/2024    ALB 4.8 11/13/2024    GLOBULIN 2.7 11/13/2024    AGRATIO 1.8 12/04/2010     11/13/2024    K 3.9 11/13/2024     11/13/2024    CO2 26.0 11/13/2024       Additional Labs:  11/2024  TB negative  ESR 14 normal  CRP normal  CMP grossly normal  CBC grossly normal    07/2024  Adalimumab antibody negative  Adalimumab level 17  ESR 24 borderline elevated  CRP normal  CMP grossly normal  CBC grossly normal    04/2024  CRP normal  ESR 24 borderline  CMP grossly normal  CBC grossly normal  Iron studies normal     01/2024  CRP normal  ESR 21 borderline  CMP grossly normal  CBC grossly normal    11/2023  HLA-B27 negative  ESR 39 elevated  CRP 0.5 normal  CMP grossly normal  CBC grossly normal  HCV nonreactive  Hep B core antibody, IgM, total nonreactive  Hepatitis surface antibody reactive  QuantiFERON-TB negative    08/2023  CBC with WBC 3.4 borderline low  CRP normal  ESR 37  elevated    09/2023  CRP 0.42 borderline   ESR 20 normal     08/2023  CRP normal  ESR 25 elevated  B12 448 normal     07/2023  ESR 33 borderline  CRP 0.32 borderline  CBC grossly normal  CMP grossly normal    06/2023  ESR 29 borderline  CRP 0.32 borderline  CBC grossly normal  CMP grossly normal  Vit D 24.7 low    04/2023  ESR 44 elevated  CRP 0.82 elevated    01/2023  IgA normal  U1 RNP negative  RNP 70 negative  ESR 16 normal  CRP 0.34 borderline  CMP grossly normal  CBC grossly normal  Vit D 25.8 low    11/11/2022  ESR 31 elevated  CRP 0.69 elevated  CMP grossly normal  CBC grossly normal    08/2022  Vit D 24.4 low  ESR 13  CRP 0.59 borderline   RNA polymerase III 59 moderate positive  EVIN by IFA negative    04/2022  EVIN/EVIN panel negative  ESR 16 normal  CRP 0.43 borderline  RNA polymerase III 64 moderate positive  EVIN by IFA negative    02/2022  EVIN by IFA  CRISTELA Panel negative  CRP 0.54 elevated   ESR 16 normal   RNA virginia III 60 moderate positive     10/2021  ANCA negative (MPO/PR3) negative  ESR 15 normal   CRP 0.31  Cryoglobulin negative   SPEP grossly negative   CBC grossly normal   CMP grossly normal   IgA, IgG, IgM normal   Total complement normal   B12 525 normal  AVISE grossly negative with exception of equivocal RNA virginia III    04/2021  CK normal  C3 152 normal  C4 24.1 normal  dsDNA negative  SSA SSB negative  Smith moderate positive  Queen/RNP moderate positive  EVIN by IFA negative    08/2020  Vitamin D 32.5  B12 384  ESR 7 normal  CRP 0.37 borderline    04/2019  B2 G negative  RF negative  CCP negative  EBV IgG, AG ABS positive; IgA negative  MPO, W[place]-3, ANCA negative  SPEP grossly normal  Cryoglobulin negative  LAC negative  ACL negative  HIV negative  HCV nonreactive  Hepatitis B serologies normal    Maggie Yossi, DO  [facility]  11/27/2024

## 2024-11-27 NOTE — PATIENT INSTRUCTIONS
-- continue methotrexate 6 tabs once weekly -- will try to get insurance to coverage for injectable methotrexate   -- continue folic acid 1mg daily   -- continue adalimumab every 14 days  -- remember to hold meds if infection present  -- start PT for the shoulder   -- will get you back on schedule for left shoulder injection vs see orthopedics   -- continue cyclobenzaprine as needed  -- follow up in 3 months or sooner as needed  -- attempt smoking cessation or at least cutting back as this is likely worsening your inflammation   -- get updated labs and will include antibodies against the adalimumab similar to what we checked in August.  If positive, will need to change biologic therapy.  -- consider re-evaluation by GI due to the worsened constipation    Dr. Sy

## 2024-12-02 NOTE — TELEPHONE ENCOUNTER
Called [employer] to initiate PA US guided shoulder injection CPT 89061. No PA required. Ref# [id number].

## 2024-12-03 DIAGNOSIS — M62.838 MUSCLE SPASM: ICD-10-CM

## 2024-12-03 DIAGNOSIS — M25.50 POLYARTHRALGIA: Primary | ICD-10-CM

## 2024-12-03 RX ORDER — ADALIMUMAB-ADAZ 40 MG/.4ML
1 INJECTION, SOLUTION SUBCUTANEOUS
Qty: 6 EACH | Refills: 1 | Status: SHIPPED | OUTPATIENT
Start: 2024-12-03

## 2024-12-03 NOTE — TELEPHONE ENCOUNTER
Last office visit: 11/27/24    Next Rheum Apt:12/4/2024 Maggie Sy DO    Last fill: 8/6/24    Labs:   Lab Results   Component Value Date    CREATSERUM 0.84 11/13/2024    GFR >59 12/04/2010    ALKPHO 83 11/13/2024    AST 19 11/13/2024    ALT 24 11/13/2024    BILT 0.4 11/13/2024    TP 7.5 11/13/2024    ALB 4.8 11/13/2024       Lab Results   Component Value Date    WBC 5.7 11/13/2024    HGB 15.2 11/13/2024    .0 11/13/2024    NEPRELIM 2.78 11/13/2024    NEPERCENT 48.6 11/13/2024    LYPERCENT 43.6 11/13/2024    NE 2.78 11/13/2024    LYMABS 2.50 11/13/2024

## 2024-12-04 ENCOUNTER — OFFICE VISIT (OUTPATIENT)
Dept: RHEUMATOLOGY | Facility: CLINIC | Age: 44
End: 2024-12-04
Payer: COMMERCIAL

## 2024-12-04 VITALS — DIASTOLIC BLOOD PRESSURE: 86 MMHG | SYSTOLIC BLOOD PRESSURE: 130 MMHG | TEMPERATURE: 98 F

## 2024-12-04 DIAGNOSIS — G89.29 CHRONIC LEFT SHOULDER PAIN: Primary | ICD-10-CM

## 2024-12-04 DIAGNOSIS — M25.512 CHRONIC LEFT SHOULDER PAIN: Primary | ICD-10-CM

## 2024-12-04 DIAGNOSIS — M75.02 ADHESIVE CAPSULITIS OF LEFT SHOULDER: ICD-10-CM

## 2024-12-04 PROCEDURE — 20611 DRAIN/INJ JOINT/BURSA W/US: CPT | Performed by: INTERNAL MEDICINE

## 2024-12-04 PROCEDURE — 3075F SYST BP GE 130 - 139MM HG: CPT | Performed by: INTERNAL MEDICINE

## 2024-12-04 PROCEDURE — 3079F DIAST BP 80-89 MM HG: CPT | Performed by: INTERNAL MEDICINE

## 2024-12-04 RX ORDER — METHYLPREDNISOLONE ACETATE 40 MG/ML
40 INJECTION, SUSPENSION INTRA-ARTICULAR; INTRALESIONAL; INTRAMUSCULAR; SOFT TISSUE ONCE
Status: COMPLETED | OUTPATIENT
Start: 2024-12-04 | End: 2024-12-04

## 2024-12-04 RX ORDER — LIDOCAINE HYDROCHLORIDE 10 MG/ML
6 INJECTION, SOLUTION INFILTRATION; PERINEURAL ONCE
Status: COMPLETED | OUTPATIENT
Start: 2024-12-04 | End: 2024-12-04

## 2024-12-04 RX ORDER — CYCLOBENZAPRINE HCL 5 MG
5 TABLET ORAL 2 TIMES DAILY PRN
Qty: 60 TABLET | Refills: 0 | Status: SHIPPED | OUTPATIENT
Start: 2024-12-04

## 2024-12-04 RX ADMIN — METHYLPREDNISOLONE ACETATE 40 MG: 40 INJECTION, SUSPENSION INTRA-ARTICULAR; INTRALESIONAL; INTRAMUSCULAR; SOFT TISSUE at 12:50:00

## 2024-12-04 RX ADMIN — LIDOCAINE HYDROCHLORIDE 6 ML: 10 INJECTION, SOLUTION INFILTRATION; PERINEURAL at 12:49:00

## 2024-12-04 NOTE — TELEPHONE ENCOUNTER
Last office visit: 11/27/24    Next Rheum Apt:12/4/2024 Maggie Sy DO    Last fill: 5/8/24    Labs:   Lab Results   Component Value Date    CREATSERUM 0.84 11/13/2024    GFR >59 12/04/2010    ALKPHO 83 11/13/2024    AST 19 11/13/2024    ALT 24 11/13/2024    BILT 0.4 11/13/2024    TP 7.5 11/13/2024    ALB 4.8 11/13/2024       Lab Results   Component Value Date    WBC 5.7 11/13/2024    HGB 15.2 11/13/2024    .0 11/13/2024    NEPRELIM 2.78 11/13/2024    NEPERCENT 48.6 11/13/2024    LYPERCENT 43.6 11/13/2024    NE 2.78 11/13/2024    LYMABS 2.50 11/13/2024

## 2024-12-04 NOTE — PROCEDURES
Left  Glenohumoral Joint Procedure Note with Full Functional Exam  Procedure: Ultrasound examination of the left Shoulder  Equipment: SonoSite edge II  Indication: Pain in left Shoulder.  Findings: Ultrasound examination is performed according to standard EU LAR recommendations(1). This was performed using a SonoSite edge II ultrasound machine .Using the 15-6 linear transducer, as well as color power Doppler settings, real-time imaging left shoulder was performed to evaluate the bones, subcutaneous tissues, muscles, tendons and the joint spaces. Longitudinal and transverse images were obtained.     Clinical correlation and other imaging studies may be indicated.  Reference:Guidelines for musculoskeletal ultrasound in rheumatology  Gagan DEE., Bere GR, Wolfgang PHILLIPS., Morgan TONY., Lisa KP, Aida WA, Calista RJ, Preethi B.; working group for musculoskeletal ultrasound in the EULAR standing committee on international clinical studies including therapeutic trials.  Annals rheumatic disease 2001 July; 60(7): 641-9    Indication for ultrasound: Evaluation of joint damage and proper placement of needle into glenohumoral joint. After consent and discussing pro and cons as well as alternatives to treatment. The left glenohumoral joint (GH) was evaluated with a Sonosite edge II ultrasound machine using a 15-6 megahertz transducer. Views in the longitundinal and transverse were obtained. In a transverse view with an inline approach an 22 gauge needles was inserted into the GH joint under real time imaging. 1cc of 1% lidocaine combined with 40mg of depomedrol were injected into the joint. The labrum was noted and this was avoided. Prior to the injection the area was cleansed with Hibiclens and alcohol and 5cc of 1% lidocaine was used for skin anesthesia.    Patient tolerated procedure well. Post procedure instructions were given as well as instructions for followup.    Maggie Sy,   [facility]  12/4/2024

## 2024-12-05 NOTE — PROGRESS NOTES
PA for Rasuvo denied. Reason for denial - covered uses are for RA, polyarticular idiopathic arthritis, and psoriasis, pt plan does not cover for her health condition,

## 2024-12-06 DIAGNOSIS — M35.9 UNDIFFERENTIATED CONNECTIVE TISSUE DISEASE (HCC): ICD-10-CM

## 2024-12-06 DIAGNOSIS — M25.50 POLYARTHRALGIA: Primary | ICD-10-CM

## 2024-12-06 RX ORDER — NEEDLES, SAFETY 22GX1 1/2"
NEEDLE, DISPOSABLE MISCELLANEOUS
Qty: 15 EACH | Refills: 0 | Status: CANCELLED | OUTPATIENT
Start: 2024-12-06

## 2024-12-06 RX ORDER — METHOTREXATE 25 MG/ML
20 INJECTION, SOLUTION INTRA-ARTERIAL; INTRAMUSCULAR; INTRAVENOUS WEEKLY
Qty: 4 EACH | Refills: 0 | Status: CANCELLED | OUTPATIENT
Start: 2024-12-06 | End: 2025-01-05

## 2024-12-06 RX ORDER — BLOOD SUGAR DIAGNOSTIC
1 STRIP MISCELLANEOUS WEEKLY
Qty: 1 EACH | Refills: 0 | Status: CANCELLED | OUTPATIENT
Start: 2024-12-06

## 2024-12-06 RX ORDER — METHOTREXATE 25 MG/ML
20 INJECTION, SOLUTION INTRA-ARTERIAL; INTRAMUSCULAR; INTRAVENOUS WEEKLY
Qty: 4 EACH | Refills: 2 | Status: SHIPPED | OUTPATIENT
Start: 2024-12-06 | End: 2025-01-05

## 2024-12-06 RX ORDER — BLOOD SUGAR DIAGNOSTIC
1 STRIP MISCELLANEOUS WEEKLY
Qty: 1 EACH | Refills: 0 | Status: SHIPPED | OUTPATIENT
Start: 2024-12-06

## 2024-12-18 ENCOUNTER — LAB ENCOUNTER (OUTPATIENT)
Dept: LAB | Facility: REFERENCE LAB | Age: 44
End: 2024-12-18
Attending: INTERNAL MEDICINE
Payer: COMMERCIAL

## 2024-12-18 DIAGNOSIS — M35.9 UNDIFFERENTIATED CONNECTIVE TISSUE DISEASE (HCC): ICD-10-CM

## 2024-12-18 DIAGNOSIS — E55.9 VITAMIN D DEFICIENCY: ICD-10-CM

## 2024-12-18 DIAGNOSIS — M25.50 POLYARTHRALGIA: ICD-10-CM

## 2024-12-18 DIAGNOSIS — Z79.620 LONG-TERM USE OF ADALIMUMAB: ICD-10-CM

## 2024-12-18 DIAGNOSIS — E61.1 IRON DEFICIENCY: ICD-10-CM

## 2024-12-18 DIAGNOSIS — R53.82 CHRONIC FATIGUE: ICD-10-CM

## 2024-12-18 DIAGNOSIS — D84.821 IMMUNOCOMPROMISED STATE DUE TO DRUG THERAPY (HCC): ICD-10-CM

## 2024-12-18 DIAGNOSIS — M79.10 MYALGIA: ICD-10-CM

## 2024-12-18 DIAGNOSIS — Z79.899 IMMUNOCOMPROMISED STATE DUE TO DRUG THERAPY (HCC): ICD-10-CM

## 2024-12-18 LAB
CK SERPL-CCNC: 102 U/L
DEPRECATED HBV CORE AB SER IA-ACNC: 66 NG/ML
IRON SATN MFR SERPL: 17 %
IRON SERPL-MCNC: 71 UG/DL
T4 FREE SERPL-MCNC: 1 NG/DL (ref 0.8–1.7)
TIBC SERPL-MCNC: 425 UG/DL (ref 250–425)
TRANSFERRIN SERPL-MCNC: 285 MG/DL (ref 250–380)
TSI SER-ACNC: 0.38 UIU/ML (ref 0.55–4.78)
VIT B12 SERPL-MCNC: 519 PG/ML (ref 211–911)
VIT D+METAB SERPL-MCNC: 15.7 NG/ML (ref 30–100)

## 2024-12-18 PROCEDURE — 82550 ASSAY OF CK (CPK): CPT | Performed by: INTERNAL MEDICINE

## 2024-12-18 PROCEDURE — 82728 ASSAY OF FERRITIN: CPT | Performed by: INTERNAL MEDICINE

## 2024-12-18 PROCEDURE — 84443 ASSAY THYROID STIM HORMONE: CPT | Performed by: INTERNAL MEDICINE

## 2024-12-18 PROCEDURE — 84466 ASSAY OF TRANSFERRIN: CPT | Performed by: INTERNAL MEDICINE

## 2024-12-18 PROCEDURE — 82607 VITAMIN B-12: CPT | Performed by: INTERNAL MEDICINE

## 2024-12-18 PROCEDURE — 82397 CHEMILUMINESCENT ASSAY: CPT | Performed by: INTERNAL MEDICINE

## 2024-12-18 PROCEDURE — 80299 QUANTITATIVE ASSAY DRUG: CPT | Performed by: INTERNAL MEDICINE

## 2024-12-18 PROCEDURE — 82306 VITAMIN D 25 HYDROXY: CPT | Performed by: INTERNAL MEDICINE

## 2024-12-18 PROCEDURE — 83540 ASSAY OF IRON: CPT | Performed by: INTERNAL MEDICINE

## 2024-12-18 PROCEDURE — 84439 ASSAY OF FREE THYROXINE: CPT | Performed by: INTERNAL MEDICINE

## 2024-12-23 DIAGNOSIS — E55.9 VITAMIN D DEFICIENCY: Primary | ICD-10-CM

## 2024-12-23 RX ORDER — ERGOCALCIFEROL 1.25 MG/1
50000 CAPSULE, LIQUID FILLED ORAL WEEKLY
Qty: 12 CAPSULE | Refills: 0 | Status: SHIPPED | OUTPATIENT
Start: 2024-12-23 | End: 2025-03-17

## 2024-12-26 LAB
ADALIMUMAB LVL: 15 UG/ML
ANTI-ADALIMUMAB AB: <25 NG/ML

## 2024-12-31 ENCOUNTER — TELEPHONE (OUTPATIENT)
Facility: CLINIC | Age: 44
End: 2024-12-31

## 2025-01-03 ENCOUNTER — PATIENT MESSAGE (OUTPATIENT)
Dept: RHEUMATOLOGY | Facility: CLINIC | Age: 45
End: 2025-01-03

## 2025-01-09 NOTE — TELEPHONE ENCOUNTER
Spoke to Allan at [facility] they will complete as  urgent request PA Hyrimoz. Enrollment form faxed.

## 2025-01-17 ENCOUNTER — PATIENT MESSAGE (OUTPATIENT)
Dept: RHEUMATOLOGY | Facility: CLINIC | Age: 45
End: 2025-01-17

## 2025-01-17 DIAGNOSIS — M35.9 UNDIFFERENTIATED CONNECTIVE TISSUE DISEASE (HCC): Primary | ICD-10-CM

## 2025-01-17 RX ORDER — METHYLPREDNISOLONE 4 MG/1
TABLET ORAL
Qty: 1 EACH | Refills: 0 | Status: SHIPPED | OUTPATIENT
Start: 2025-01-17

## 2025-02-25 DIAGNOSIS — R70.0 ELEVATED SED RATE: ICD-10-CM

## 2025-02-25 DIAGNOSIS — D84.821 IMMUNOCOMPROMISED STATE DUE TO DRUG THERAPY (HCC): ICD-10-CM

## 2025-02-25 DIAGNOSIS — M25.50 POLYARTHRALGIA: ICD-10-CM

## 2025-02-25 DIAGNOSIS — Z79.899 IMMUNOCOMPROMISED STATE DUE TO DRUG THERAPY (HCC): ICD-10-CM

## 2025-02-25 DIAGNOSIS — Z79.899 HIGH RISK MEDICATION USE: ICD-10-CM

## 2025-02-25 DIAGNOSIS — M35.9 UNDIFFERENTIATED CONNECTIVE TISSUE DISEASE (HCC): Primary | ICD-10-CM

## 2025-02-25 RX ORDER — METHOTREXATE 25 MG/ML
INJECTION, SOLUTION INTRAMUSCULAR; INTRATHECAL; INTRAVENOUS; SUBCUTANEOUS
Qty: 12 ML | Refills: 0 | Status: SHIPPED | OUTPATIENT
Start: 2025-02-25

## 2025-02-25 NOTE — TELEPHONE ENCOUNTER
MCM sent as a reminder to complete monitoring methotrexate lab work. Awaiting possible response.

## 2025-02-25 NOTE — TELEPHONE ENCOUNTER
LOV: 11/27/2024 Office visit and 12/04/2024 Injection visit    Future Appointments   Date Time Provider Department Center   4/9/2025  3:30 PM Yossi DO Maggie [facility]   7/9/2025  9:45 AM Yossi DO Maggie [facility]   10/29/2025 11:15 AM Yossi MaggieDO [facility]       LF: 12/06/2024    QTY: 12 mL    Refills: 0    LABS:   Component      Latest Ref Rng 11/13/2024   WBC      4.0 - 11.0 x10(3) uL 5.7    RBC      3.80 - 5.30 x10(6)uL 4.80    Hemoglobin      12.0 - 16.0 g/dL 15.2    Hematocrit      35.0 - 48.0 % 44.2    MCV      80.0 - 100.0 fL 92.1    MCH      26.0 - 34.0 pg 31.7    MCHC      31.0 - 37.0 g/dL 34.4    RDW-SD      35.1 - 46.3 fL 45.9    RDW      11.0 - 15.0 % 13.4    Platelet Count      150.0 - 450.0 10(3)uL 277.0    Prelim Neutrophil Abs      1.50 - 7.70 x10 (3) uL 2.78    Neutrophils Absolute      1.50 - 7.70 x10(3) uL 2.78    Lymphocytes Absolute      1.00 - 4.00 x10(3) uL 2.50    Monocytes Absolute      0.10 - 1.00 x10(3) uL 0.34    Eosinophils Absolute      0.00 - 0.70 x10(3) uL 0.04    Basophils Absolute      0.00 - 0.20 x10(3) uL 0.06    Immature Granulocyte Absolute      0.00 - 1.00 x10(3) uL 0.01    Neutrophils %      % 48.6    Lymphocytes %      % 43.6    Monocytes %      % 5.9    Eosinophils %      % 0.7    Basophils %      % 1.0    Immature Granulocyte %      % 0.2    Glucose      70 - 99 mg/dL 114 (H)    Sodium      136 - 145 mmol/L 139    Potassium      3.5 - 5.1 mmol/L 3.9    Chloride      98 - 112 mmol/L 107    Carbon Dioxide, Total      21.0 - 32.0 mmol/L 26.0    ANION GAP      0 - 18 mmol/L 6    BUN      9 - 23 mg/dL 13    CREATININE      0.55 - 1.02 mg/dL 0.84    BUN/CREATININE RATIO      10.0 - 20.0  15.5    CALCIUM      8.7 - 10.4 mg/dL 9.9    CALCULATED OSMOLALITY      275 - 295 mOsm/kg 289    EGFR      >=60 mL/min/1.73m2 88    ALT (SGPT)      10 - 49 U/L 24    AST (SGOT)      <34 U/L 19    ALKALINE PHOSPHATASE      37 - 98 U/L 83     Total Bilirubin      0.3 - 1.2 mg/dL 0.4    PROTEIN, TOTAL      5.7 - 8.2 g/dL 7.5    Albumin      3.2 - 4.8 g/dL 4.8    Globulin      2.0 - 3.5 g/dL 2.7    A/G Ratio      1.0 - 2.0  1.8    Patient Fasting for CMP? No    SED RATE      0 - 20 mm/Hr 14       Legend:  (H) High

## 2025-03-26 ENCOUNTER — LAB ENCOUNTER (OUTPATIENT)
Dept: LAB | Facility: REFERENCE LAB | Age: 45
End: 2025-03-26
Attending: INTERNAL MEDICINE
Payer: COMMERCIAL

## 2025-03-26 DIAGNOSIS — M35.9 UNDIFFERENTIATED CONNECTIVE TISSUE DISEASE (HCC): ICD-10-CM

## 2025-03-26 DIAGNOSIS — R70.0 ELEVATED SED RATE: ICD-10-CM

## 2025-03-26 DIAGNOSIS — M25.50 POLYARTHRALGIA: ICD-10-CM

## 2025-03-26 DIAGNOSIS — D84.821 IMMUNOCOMPROMISED STATE DUE TO DRUG THERAPY (HCC): ICD-10-CM

## 2025-03-26 DIAGNOSIS — Z79.899 IMMUNOCOMPROMISED STATE DUE TO DRUG THERAPY (HCC): ICD-10-CM

## 2025-03-26 DIAGNOSIS — E55.9 VITAMIN D DEFICIENCY: ICD-10-CM

## 2025-03-26 DIAGNOSIS — Z79.899 HIGH RISK MEDICATION USE: ICD-10-CM

## 2025-03-26 LAB
ALBUMIN SERPL-MCNC: 4.4 G/DL (ref 3.2–4.8)
ALBUMIN/GLOB SERPL: 1.7 {RATIO} (ref 1–2)
ALP LIVER SERPL-CCNC: 84 U/L
ALT SERPL-CCNC: 32 U/L
ANION GAP SERPL CALC-SCNC: 7 MMOL/L (ref 0–18)
AST SERPL-CCNC: 23 U/L (ref ?–34)
BASOPHILS # BLD AUTO: 0.07 X10(3) UL (ref 0–0.2)
BASOPHILS NFR BLD AUTO: 1.2 %
BILIRUB SERPL-MCNC: 0.3 MG/DL (ref 0.3–1.2)
BUN BLD-MCNC: 10 MG/DL (ref 9–23)
BUN/CREAT SERPL: 12.5 (ref 10–20)
CALCIUM BLD-MCNC: 9.4 MG/DL (ref 8.7–10.4)
CHLORIDE SERPL-SCNC: 104 MMOL/L (ref 98–112)
CO2 SERPL-SCNC: 28 MMOL/L (ref 21–32)
CREAT BLD-MCNC: 0.8 MG/DL
CRP SERPL-MCNC: <0.4 MG/DL (ref ?–1)
DEPRECATED RDW RBC AUTO: 43.1 FL (ref 35.1–46.3)
EGFRCR SERPLBLD CKD-EPI 2021: 93 ML/MIN/1.73M2 (ref 60–?)
EOSINOPHIL # BLD AUTO: 0.1 X10(3) UL (ref 0–0.7)
EOSINOPHIL NFR BLD AUTO: 1.7 %
ERYTHROCYTE [DISTWIDTH] IN BLOOD BY AUTOMATED COUNT: 12.8 % (ref 11–15)
ERYTHROCYTE [SEDIMENTATION RATE] IN BLOOD: 16 MM/HR
FASTING STATUS PATIENT QL REPORTED: NO
GLOBULIN PLAS-MCNC: 2.6 G/DL (ref 2–3.5)
GLUCOSE BLD-MCNC: 88 MG/DL (ref 70–99)
HCT VFR BLD AUTO: 43.2 %
HGB BLD-MCNC: 14.9 G/DL
IMM GRANULOCYTES # BLD AUTO: 0.02 X10(3) UL (ref 0–1)
IMM GRANULOCYTES NFR BLD: 0.3 %
LYMPHOCYTES # BLD AUTO: 2.45 X10(3) UL (ref 1–4)
LYMPHOCYTES NFR BLD AUTO: 40.7 %
MCH RBC QN AUTO: 31.5 PG (ref 26–34)
MCHC RBC AUTO-ENTMCNC: 34.5 G/DL (ref 31–37)
MCV RBC AUTO: 91.3 FL
MONOCYTES # BLD AUTO: 0.59 X10(3) UL (ref 0.1–1)
MONOCYTES NFR BLD AUTO: 9.8 %
NEUTROPHILS # BLD AUTO: 2.79 X10 (3) UL (ref 1.5–7.7)
NEUTROPHILS # BLD AUTO: 2.79 X10(3) UL (ref 1.5–7.7)
NEUTROPHILS NFR BLD AUTO: 46.3 %
OSMOLALITY SERPL CALC.SUM OF ELEC: 286 MOSM/KG (ref 275–295)
PLATELET # BLD AUTO: 302 10(3)UL (ref 150–450)
POTASSIUM SERPL-SCNC: 3.6 MMOL/L (ref 3.5–5.1)
PROT SERPL-MCNC: 7 G/DL (ref 5.7–8.2)
RBC # BLD AUTO: 4.73 X10(6)UL
SODIUM SERPL-SCNC: 139 MMOL/L (ref 136–145)
VIT D+METAB SERPL-MCNC: 28.3 NG/ML (ref 30–100)
WBC # BLD AUTO: 6 X10(3) UL (ref 4–11)

## 2025-03-26 PROCEDURE — 86140 C-REACTIVE PROTEIN: CPT | Performed by: INTERNAL MEDICINE

## 2025-03-26 PROCEDURE — 85652 RBC SED RATE AUTOMATED: CPT | Performed by: INTERNAL MEDICINE

## 2025-03-26 PROCEDURE — 82306 VITAMIN D 25 HYDROXY: CPT | Performed by: INTERNAL MEDICINE

## 2025-03-26 PROCEDURE — 80053 COMPREHEN METABOLIC PANEL: CPT | Performed by: INTERNAL MEDICINE

## 2025-03-26 PROCEDURE — 85025 COMPLETE CBC W/AUTO DIFF WBC: CPT | Performed by: INTERNAL MEDICINE

## 2025-04-09 ENCOUNTER — OFFICE VISIT (OUTPATIENT)
Dept: RHEUMATOLOGY | Facility: CLINIC | Age: 45
End: 2025-04-09
Payer: COMMERCIAL

## 2025-04-09 VITALS
OXYGEN SATURATION: 97 % | SYSTOLIC BLOOD PRESSURE: 132 MMHG | RESPIRATION RATE: 16 BRPM | TEMPERATURE: 98 F | DIASTOLIC BLOOD PRESSURE: 90 MMHG | HEIGHT: 61 IN | BODY MASS INDEX: 36.78 KG/M2 | WEIGHT: 194.81 LBS | HEART RATE: 114 BPM

## 2025-04-09 DIAGNOSIS — M35.9 UNDIFFERENTIATED CONNECTIVE TISSUE DISEASE (HCC): Primary | ICD-10-CM

## 2025-04-09 DIAGNOSIS — R53.82 CHRONIC FATIGUE: ICD-10-CM

## 2025-04-09 DIAGNOSIS — Z79.620 LONG-TERM USE OF ADALIMUMAB: ICD-10-CM

## 2025-04-09 DIAGNOSIS — M25.50 POLYARTHRALGIA: ICD-10-CM

## 2025-04-09 DIAGNOSIS — E55.9 VITAMIN D DEFICIENCY: ICD-10-CM

## 2025-04-09 DIAGNOSIS — Z79.899 IMMUNOCOMPROMISED STATE DUE TO DRUG THERAPY (HCC): ICD-10-CM

## 2025-04-09 DIAGNOSIS — M62.838 MUSCLE SPASM: ICD-10-CM

## 2025-04-09 DIAGNOSIS — M25.512 CHRONIC LEFT SHOULDER PAIN: ICD-10-CM

## 2025-04-09 DIAGNOSIS — Z71.6 ENCOUNTER FOR SMOKING CESSATION COUNSELING: ICD-10-CM

## 2025-04-09 DIAGNOSIS — Z79.899 HIGH RISK MEDICATION USE: ICD-10-CM

## 2025-04-09 DIAGNOSIS — D84.821 IMMUNOCOMPROMISED STATE DUE TO DRUG THERAPY (HCC): ICD-10-CM

## 2025-04-09 DIAGNOSIS — G89.29 CHRONIC LEFT SHOULDER PAIN: ICD-10-CM

## 2025-04-09 DIAGNOSIS — M75.02 ADHESIVE CAPSULITIS OF LEFT SHOULDER: ICD-10-CM

## 2025-04-09 DIAGNOSIS — H20.9 UVEITIS: ICD-10-CM

## 2025-04-09 PROCEDURE — G2211 COMPLEX E/M VISIT ADD ON: HCPCS | Performed by: INTERNAL MEDICINE

## 2025-04-09 PROCEDURE — 3075F SYST BP GE 130 - 139MM HG: CPT | Performed by: INTERNAL MEDICINE

## 2025-04-09 PROCEDURE — 99214 OFFICE O/P EST MOD 30 MIN: CPT | Performed by: INTERNAL MEDICINE

## 2025-04-09 PROCEDURE — 3008F BODY MASS INDEX DOCD: CPT | Performed by: INTERNAL MEDICINE

## 2025-04-09 PROCEDURE — 3080F DIAST BP >= 90 MM HG: CPT | Performed by: INTERNAL MEDICINE

## 2025-04-09 RX ORDER — FOLIC ACID 1 MG/1
1 TABLET ORAL DAILY
Qty: 90 TABLET | Refills: 0 | Status: SHIPPED | OUTPATIENT
Start: 2025-04-09

## 2025-04-09 RX ORDER — RABEPRAZOLE SODIUM 20 MG/1
20 TABLET, DELAYED RELEASE ORAL DAILY
COMMUNITY
Start: 2025-03-23

## 2025-04-09 RX ORDER — CYCLOBENZAPRINE HCL 5 MG
5 TABLET ORAL 2 TIMES DAILY PRN
Qty: 60 TABLET | Refills: 0 | Status: SHIPPED | OUTPATIENT
Start: 2025-04-09

## 2025-04-09 NOTE — PROGRESS NOTES
?  RHEUMATOLOGY FOLLOW UP   Date of visit: 04/09/2025    Chief Complaint   Patient presents with    Follow - Up     LOV 12/4/2024  Rapid 3 score is 0.7  Patient states her pain is on and off in her rt knee. Her left shoulder pain is starting to return as well.      ?  ASSESSMENT, DISCUSSION & PLAN   Assessment:  1. Undifferentiated connective tissue disease (HCC)    2. Polyarthralgia    3. Muscle spasm    4. Immunocompromised state due to drug therapy (HCC)    5. High risk medication use    6. Chronic left shoulder pain    7. Adhesive capsulitis of left shoulder    8. Long-term use of adalimumab    9. Chronic fatigue    10. Uveitis    11. Vitamin D deficiency    12. Encounter for smoking cessation counseling            Discussion:  Ms. Amelie Gonzalez is a 46 yo woman with a plethora of complaints.  It seems that she was previously diagnosed with potentially lupus given Smith antibody positivity, however her EVIN was normal, complements normal as well as inflammatory marker were normal.  Patient does have a history of livedo reticularis and possible Raynaud's.  She also complains of rashes, oral/nasal ulcerations, joint pain with swelling as well as multisystem complaints.  It seems like she was previously put on Plaquenil and had facial rash, however unclear if rash was connected to medication itself.  She lacks any overt signs of synovitis on her exam today.  Her labs were grossly negative for autoimmune etiology to her livedo and Raynaud's. She had positive RNA virginia III testing but EVIN and otherwise negative workup.  She was seen by rheumatology at - said unlikely scleroderma. Recommended hand US if swelling recurs. Knee xrays consistent with mild arthritis. Recommended ophthalmology exam and to have schirmer testing done, would consider salivary lip biopsy vs parotid US for eval for Sjogren's. Strongly recommended IBD evaluation     She was previously seen by GI and colonoscopy was negative for any active  colitis, endoscopy was consistent with gastritis.  She was given Xifaxan for IBS D which has improved her nausea.  Feels like her diarrhea has improved after the last set of steroids that I ordered.  In terms of neurology, she did undergo spinal tap and was told that testing was negative for multiple sclerosis.  Unclear if there has been any etiology found to her small fiber neuropathy.    She was started on Humira previously due to worsened iritis/uveitis. She has tolerated this with improvement of eye inflammation - had to change to hyrimoz insurance preference.   She has had some intermittent flaring of the inflammatory eye disease but this typically correlates with when she has been off of the biologic due to procedure/infection.    At this time, she has been feeling stable.   Re-evaluated by GI and told could have a component of colitis. Not due for cscope yet  She has been under a lot of stress with the recent unexpected death of her father (was diagnosed with terminal cancer and was at home hospice for a few weeks before he diet). She seems to be dealing with some normal grieving but slight PTSD from that ordeal and still having difficulty sleeping at night. Her mother was also previously dx with breast cancer but doing well.   She has been tolerating injectable methotrexate, still with some GI upset and fatigue but seems better compared to the oral formulation.   Her exam does not suggest synovitis and her labs showed normal ESR/CRP  Will have her continue her current regimen.   Also strongly recommended that she work on smoking cessation send this is likely contributing to her difficult to control autoimmune disease.  Her shoulder seemed to get better with GH injection (had frozen shoulder on L) but seems like more AC Joitn pain now, will get her added back to schedule for AC joint injection.     -- continue injectable methotrexate weekly  -- continue folic acid 1mg daily   -- continue adalimumab every 14  days  -- remember to hold meds if infection present  -- will get you back on schedule for left shoulder injection  -- continue cyclobenzaprine as needed  -- get updated labs and will include antibodies against the adalimumab  -- try liquid vitamin d supplementation   -- follow up in 3 months or sooner as needed  -- attempt smoking cessation or at least cutting back as this is likely worsening your inflammation       Follow-up in 3 to 4 months or sooner as needed  Encouraged to reach out if symptoms change or worsen in the meantime    adalimumab is an injectable TNF-alpha inhibitor, which is a humanized monoclonal biologic medication used in the treatment of uveitis. This is a potent immunosuppressant medication, and as such runs the risk of increased susceptibility to infections. Based on data analysis, this is most commonly respiratory infections, but can also include all types of infection, including reactivation of latent tuberculosis if present. As such, we recommend aggressive screening for tuberculosis as well as viral hepatitis. This medication also comes with a black box warning for the possibility of maligancy. It is unclear whether this is a true causal relationship, as many of the inflammatory diseases this agent is used in have an increased risk of malignancy as well.  As with any of the biologic medications, there is always risk of allergic reaction, including anaphylaxis. We discussed these risks, and it was agreed that the benefits outweigh the possible risks of medication usage.     We previously discussed the risk and benefit of methotrexate therapy, including the possibility of liver toxicity. We will monitor her liver functions in every month for the first couple months followed by every three months thereafter, we also discussed that pulmonary hypersensitivity reactions may have been at times, including lung toxicities and she was educated on signs and symptoms such as fever, persistent cough,  and shortness of breath.Due to potential lung toxicity with this medication, baseline CXR should be on file.  There is also a risk of depression of the blood cell count. There is an increased risk of infection with this medication as well, particularly respiratory infections, we discussed this at length. We also discussed the risk of GI upset and the development of oral ulcers. It was explained in detail that folic acid was needed daily to help avoid many of these side effects.  Methotrexate is also an abortifacient medication with severe teratogenicity. This was discussed in detail as a risk. Should pregnancy occur, this medication would be extremely toxic to the fetus.  While on methotrexate, sulfa based drugs including but not limited to Bactrim, should be avoided due to potential toxicity.     She previously tried plaquenil (tried 2 different instances) but had rash/allergic reaction both times.   She may benefit from a low dose gabapentin vs low dose prednisone as recommended by neuro. Will consider this after monitoring her response to getting inflammation under control     Patient verbalized understanding of above instructions. No further questions at this time.    Code selection for this visit was based on time spent (32min) on date of service in preparing to see the patient, obtaining and/or reviewing separately obtained history, performing a medically appropriate examination, counseling and educating the patient/family/caregiver, ordering medications or testing, referring and communicating with other healthcare providers, documenting clinical information in the E HR, independently interpreting results and communicating results to the patient/family/caregiver and care coordination with the patient's other providers. In addition to multiple mychart messages prior to today's visit.   ?  Plan:  Diagnoses and all orders for this visit:    Undifferentiated connective tissue disease (HCC)  -     folic acid 1 MG  Oral Tab; Take 1 tablet (1 mg total) by mouth daily.  -     CBC With Differential With Platelet; Future  -     Comp Metabolic Panel (14); Future  -     C-Reactive Protein; Future  -     Sed Rate, Westergren (Automated); Future    Polyarthralgia  -     folic acid 1 MG Oral Tab; Take 1 tablet (1 mg total) by mouth daily.  -     cyclobenzaprine 5 MG Oral Tab; Take 1 tablet (5 mg total) by mouth 2 (two) times daily as needed for Muscle spasms.    Muscle spasm  -     cyclobenzaprine 5 MG Oral Tab; Take 1 tablet (5 mg total) by mouth 2 (two) times daily as needed for Muscle spasms.    Immunocompromised state due to drug therapy (HCC)  -     CBC With Differential With Platelet; Future  -     Comp Metabolic Panel (14); Future  -     C-Reactive Protein; Future  -     Sed Rate, Westergren (Automated); Future    High risk medication use  -     folic acid 1 MG Oral Tab; Take 1 tablet (1 mg total) by mouth daily.  -     CBC With Differential With Platelet; Future  -     Comp Metabolic Panel (14); Future  -     C-Reactive Protein; Future  -     Sed Rate, Westergren (Automated); Future    Chronic left shoulder pain    Adhesive capsulitis of left shoulder    Long-term use of adalimumab  -     CBC With Differential With Platelet; Future  -     Comp Metabolic Panel (14); Future  -     C-Reactive Protein; Future  -     Sed Rate, Westergren (Automated); Future  -     Adalimumab Activity and Neutralizing AB; Future    Chronic fatigue  -     folic acid 1 MG Oral Tab; Take 1 tablet (1 mg total) by mouth daily.    Uveitis    Vitamin D deficiency  -     Vitamin D; Future    Encounter for smoking cessation counseling                      Return in about 3 months (around 7/9/2025).  ?  HPI   Amelie Gonzalez is a 45 year old female with the following active problems who was seen as a new patient for evaluation of pain and fatigue.  She presents for follow up today.     Since her last visit, she has been doing okay.  Has been tolerating  injectable methotrexate but feels drowsy the day after it. Having some ongoing stomach issues and not sure if methotrexate related. Has constipation and cramping/frequent stools. Was seen by GI last month through [facility]. Last scope was normal so told okay to wait unless symptoms changed. But told could have colitis. Told to continue PPI and miralax.       States in , was off meds for the flu shot but felt a flare after the shot.  Then was having left sided pain, there was concern for trigeminal neuralgia. Was given steroid pack and applied topical THC to the area which helped  Then had a UTI that didn't respond to initial abx and then required 2nd course of abx  Then under stress- her father  suddenly from complications of stage IV gastric cancer (dx in mid Feb and  first week of March). She barely able to sleep when she was taking care of him with home hospice.     Otherwise, she has been doing good even considering she has been on/off meds.   Back on track with her meds for the past 4 weeks.  Had injection in the left shoulder in December did help and having some improvement of motion but still painful. Thinks more over the AC joint. Was not able to do PT due to cost and work schedule.     Hands have been okay lately   Right knee still with occasional pain but short lived.     Hasn't been back to chiropractor in some time. Thinks possibly for costochondritis     Had to switch per insurance to Hyrimoz from her Humira in the spring, previously didn't feel helping as much as Humira but tolerating and feels okay now.  Had slight iritis while off medications  Then had bronchitis after that  No recent rashes. Followed with dermatology earlier today- told folliculitis but did not require any biopsy/removals      + dry eyes, just using artifical tears. Hasn't started xiidra if the dryness worsens, allergic to plugs previously over 10 years ago.   + dry mouth   Today dryness seems bad  + recent oral and nasal  ulcer but while on medication   + livedo reticularis, stable and intermittent     Still smoking 1/2 ppd.     Previously seen by [facility] for second opinion to be evaluated for scleroderma- was told unlikely scleroderma but recommended that she be monitored for lupus and should get further work up for IBD.       HPI from initial consultation  referred for rheumatologic evaluation due to second opinion.      States she first started to have problems about 2018. Thought she was getting lymph nodes enlarged in the neck. A rash on the forehead that looked like blisters. Both resolved on it's own.   Then developed livedo reticularis appearance over the legs.   Was seen by rheumatology and initially labs were normal so no intervention started initially.    Has been suffering from GI issues- abdominal pain/cramping, diarrhea without blood in stools, nausea without vomiting. States she has been told colitis in the past. Has been chronic over the past few years.     Also rash under her breasts that lasted several months. Eventually went away on it's own.     After covid vaccination in Feb (2021), had worsened pain in her face. Has had repeat oral ulcerations. Has been seen by ENT and dentist. Had biopsy and told possible lichen planus. Has tried different ointments as well as mouth washes without improvement.     Had worsened hand swelling. When she followed with rheumatology again, her Queen antibody was positive but EVIN negative. She was started on plaquenil, and unclear if face rash started due to medication since rash still present intermittently. Happens randomly per pt, not necessarily with sun exposure.     Had worsened abdominal pain and dx with liver lesion, underwent surgical removal on 10/11. Does have improvement of the abdominal pain. Diagnosed with Von Meyenburg complex    + hx of sepsis from renal stones (2014)  + hair loss/thinning  + can get ulcer in the nose   + pain located in wrists b/l  (was wearing braces in August), b/l knees. Can get swelling in the hand and knees. Swelling diffuse over the hands, not just over the joints.   + hx of one miscarriage at 12w, able to conceive after that. No other pregnancy complications.   + GERD on medication, controlled   + trouble swallowing, had swallow study done last year and full esophagram was normal   + dry eyes, uses artifical tears and gel at night, has tried plugs before but had allergic reaction to the material.  + dry mouth, not severe. No recurrent cavities.   + achilles pain with sometimes swelling, particularly when first getting up in the mornings   + abnormal fingernails- ridging and peeling   + possible hx of dactylitis about 2019- left ring finger; no recurrence since that time but has residual itching.   + chronic sinus disease; epistaxis in the arcos  + chronic cough  + smoker since age 15; has wellbutrin to try to help quit    The patient denies elevated or scarring rashes, Raynaud's phenomenon, prior hematologic abnormality, prior renal disease (aside from the kidney stones), or history of seizures.  No history of prior blood clot in the legs or lungs, strokes or ischemic phenomenon.  Denies nonhealing ulcers on the fingertips.   The patient denies any history of uveitis, nodular painful shin bruises, psoriatic lesions, or history of dactylitis.    Works as opto technician.     Family hx:   Father with psoriatic arthritis on methotrexate and prednisone  Twin sister (identical) getting worked up for psoriatic arthritis vs mctd, has jo1+      Past Medical History:  Past Medical History:    Abdominal distention    Abdominal hernia    ABDOMINAL PAIN    mild cramping    Abdominal pain    Anxiety    Arthritis    Atypical mole    Back pain    Bad breath    Belching    Benign neoplasm of rectum    Bloating    Blood in the stool    Calculus of kidney    Cardiomegaly    CERVICAL DYSPLASIA    Change in hair    Chest pain    Constipation     Decorative tattoo    DEPRESSION    Diabetes mellitus (HCC)    gestational    Diarrhea, unspecified    Endometriosis    Enlarged lymph node    Fatigue    Flatulence/gas pain/belching    Food intolerance    Frequent urination    Frequent UTI    GERD    GERD (gastroesophageal reflux disease)    Gestational diabetes (HCC)         Headache disorder    Hearing loss    Heartburn    Hemorrhoids    History of depression    IBS (irritable bowel syndrome)    Indigestion    Irregular bowel habits    Kidney disease    Leaking of urine    Migraines    Mouth sores    Nausea    Night sweats    OBESITY    Pain in joints    Pain with bowel movements    Painful swallowing    PONV (postoperative nausea and vomiting)    Problems with swallowing    Rash    Skin blushing/flushing    Sleep disturbance    Stool incontinence    Thyroid nodule    Ulcer     Past Surgical History:  Past Surgical History:   Procedure Laterality Date       and     Cholecystectomy      Colonoscopy      Colposcopy,bx cervix/endocerv curr      moderate dysplasia    Cysto/uretero w/lithotripsy Right 2014    Procedure: LITHOTRIPSY HOLMIUM LASER WITH CYSTOSCOPY;  Surgeon: Thierno Tovar DO;  Location: [facility]    Cystouretro w/stone remove Right 2014    Procedure: CYSTOSCOPY WITH RETROGRADE PYELOGRAM;  Surgeon: Thierno Tovar DO;  Location: [facility]    D & c  2012    Egd      Fragmenting of kidney stone Right 2014    Procedure: LITHOTRIPSY WITH CYSTOSCOPY, STENT PLACEMENT;  Surgeon: Thierno Tovar DO;  Location: [facility]    Hysterectomy  10/2022    Leep  2003    Lysis of adhesions      Desiree localization wire 1 site right (cpt=19281)      age 26 had Right biopsyexciosnal; fibrocystic tissue    Needle biopsy liver      Other surgical history      Rt breast biopsy    Other surgical history  2014    Right ESWL- Dr. Webb    Other surgical history   09/02/2014    Right ESWL- Dr. Tovar    Other surgical history  09/08/2014    Cysto Stent Removal- Dr. Tovar    Tonsillectomy  1990    Total abdom hysterectomy      X-ray retrograde pyelogram Right 09/02/2014    Procedure: CYSTOSCOPY/URETEROSCOPY/STONE EXTRACTION;  Surgeon: Thierno Tovar DO;  Location: [facility]     Family History:  Family History   Problem Relation Age of Onset    Other (psoriatic arthritis) Father     Colon Polyps Mother     Lipids Mother     Heart Disorder Mother     Hypertension Mother     Diabetes Mother     Breast Cancer Mother 70    Cancer Mother     Colon Polyps Sister     Other (psoriatic arthritis) Sister     Other (mctd) Sister     Ovarian Cancer Paternal Aunt     Colon Polyps Sister      Social History:  Social History     Socioeconomic History    Marital status:    Tobacco Use    Smoking status: Every Day     Current packs/day: 1.00     Average packs/day: 1 pack/day for 15.0 years (15.0 ttl pk-yrs)     Types: Cigarettes    Smokeless tobacco: Never   Vaping Use    Vaping status: Never Used   Substance and Sexual Activity    Alcohol use: Not Currently    Drug use: No    Sexual activity: Yes     Partners: Male     Social Drivers of Health      Received from [facility]     Medications:  Outpatient Medications Marked as Taking for the 4/9/25 encounter (Office Visit) with Maggie Sy DO   Medication Sig Dispense Refill    ACIPHEX 20 MG Oral Tab EC Take 1 tablet (20 mg total) by mouth daily.      folic acid 1 MG Oral Tab Take 1 tablet (1 mg total) by mouth daily. 90 tablet 0    cyclobenzaprine 5 MG Oral Tab Take 1 tablet (5 mg total) by mouth 2 (two) times daily as needed for Muscle spasms. 60 tablet 0    METHOTREXATE SODIUM 50 MG/2ML Injection Solution INJECT 0.8 MLS AS DIRECTED ONCE A WEEK 12 mL 0    Adalimumab-adaz (HYRIMOZ) 40 MG/0.4ML Subcutaneous Solution Auto-injector INJECT 1 PEN UNDER THE SKIN EVERY 14 DAYS 6 each 1     desvenlafaxine  MG Oral Tablet 24 Hr Take 1 tablet (100 mg total) by mouth daily.      fluticasone propionate 50 MCG/ACT Nasal Suspension 2 sprays by Nasal route daily.      loratadine 10 MG Oral Tab Take 1 tablet (10 mg total) by mouth daily.      prednisoLONE 1 % Ophthalmic Suspension Place 1 drop into both eyes 4 (four) times daily.      hydrocortisone 2.5 % External Ointment       olopatadine 0.1 % Ophthalmic Solution INSTILL 1 DROP BY OPHTHALMIC ROUTE 2 TIMES EVERY DAY INTO BOTH EYES AS NEEDED      hyoscyamine 0.125 MG Sublingual SL Tab Place 1 tablet (125 mcg total) under the tongue every 6 (six) hours as needed for Cramping. 120 tablet 0    Albuterol Sulfate  (90 Base) MCG/ACT Inhalation Aero Soln Inhale 2 puffs into the lungs every 6 (six) hours as needed.      LOPERAMIDE HCL OR Take by mouth as needed.       Modified Medications    Modified Medication Previous Medication    CYCLOBENZAPRINE 5 MG ORAL TAB CYCLOBENZAPRINE 5 MG Oral Tab       Take 1 tablet (5 mg total) by mouth 2 (two) times daily as needed for Muscle spasms.    TAKE 1 TABLET BY MOUTH 2 TIMES DAILY AS NEEDED FOR MUSCLE SPASMS.    FOLIC ACID 1 MG ORAL TAB folic acid 1 MG Oral Tab       Take 1 tablet (1 mg total) by mouth daily.    Take 1 tablet (1 mg total) by mouth daily.     Medications Discontinued During This Encounter   Medication Reason    folic acid 1 MG Oral Tab     CYCLOBENZAPRINE 5 MG Oral Tab        ?  ?  Allergies:  Allergies   Allergen Reactions    Aluminum RASH and OTHER (SEE COMMENTS)     Ulcers on skin    Azithromycin ANGIOEDEMA and SWELLING    Biaxin [Clarithromycin] TONGUE SWELLING    Clarithromycin ANGIOEDEMA, SWELLING and TONGUE SWELLING     Tongue swells and develops sores in mouth  Tongue swells    Tongue swells and develops sores in mouth    Duloxetine MYALGIA and OTHER (SEE COMMENTS)     Vomiting and jaw lock    Vomiting and jaw lock  Other reaction(s): Myalgias (muscle pain)  Vomiting and jaw lock     Erythromycin ANGIOEDEMA and SWELLING    Nickel OTHER (SEE COMMENTS) and RASH     Skin ulcer.    Skin ulcer.  Skin ulcer.    Duloxetine Hcl NAUSEA AND VOMITING    Nickel RASH     Also reaction to aluminum- enviornmental      Silver UNKNOWN    Hydroxychloroquine RASH    Sulfa Antibiotics RASH     ?  REVIEW OF SYSTEMS   ?  Review of Systems   Constitutional:  Positive for malaise/fatigue. Negative for chills, fever and weight loss.   Eyes:  Positive for blurred vision, double vision and photophobia. Negative for pain and redness.   Respiratory:  Positive for cough. Negative for hemoptysis and shortness of breath.    Cardiovascular:  Positive for chest pain (told costochondritis) and palpitations. Negative for leg swelling.   Gastrointestinal:  Positive for constipation and heartburn. Negative for abdominal pain, blood in stool, diarrhea and nausea.   Genitourinary:  Positive for urgency. Negative for dysuria, frequency and hematuria.   Musculoskeletal:  Positive for back pain, joint pain (but some hand swelling), myalgias and neck pain.   Skin:  Negative for itching and rash.   Neurological:  Positive for dizziness (intermittently- more lightheaded) and headaches (intermittently). Negative for tingling and weakness.   Endo/Heme/Allergies:  Positive for environmental allergies. Bruises/bleeds easily.   Psychiatric/Behavioral:  Positive for depression. The patient is nervous/anxious and has insomnia.         Feeling better overall      PHYSICAL EXAM   Today's Vitals:  Temperature Blood Pressure Heart Rate Resp Rate SpO2   Temp: 98.4 °F (36.9 °C) BP: 132/90 Pulse: 114 Resp: 16 SpO2: 97 %   ?  Current Weight Height BMI BSA Pain   Wt Readings from Last 1 Encounters:   04/09/25 194 lb 12.8 oz (88.4 kg)    Height: 5' 1\" (154.9 cm) Body mass index is 36.81 kg/m². Body surface area is 1.87 meters squared.         Physical Exam  Vitals and nursing note reviewed.   Constitutional:       General: She is not in acute distress.      Appearance: Normal appearance. She is well-developed. She is not diaphoretic.   HENT:      Head: Normocephalic.   Eyes:      General: No scleral icterus.     Extraocular Movements: Extraocular movements intact.      Conjunctiva/sclera: Conjunctivae normal.   Neck:      Vascular: No JVD.      Trachea: No tracheal deviation.   Cardiovascular:      Rate and Rhythm: Normal rate and regular rhythm.      Heart sounds: No murmur heard.  Pulmonary:      Effort: Pulmonary effort is normal. No respiratory distress.      Breath sounds: Normal breath sounds. No wheezing.   Musculoskeletal:         General: Tenderness present. No swelling.      Cervical back: Neck supple.      Comments: Diffuse tenderness- particularly over Mcps and dorsum of hands and b/l wrist - improved overall  No swelling, redness or restriction of motion of the DIPs, PIPs, MCPs, wrists, elbows, ankles, or joints of the feet except right mid foot  Post surgical changes noted over distal right foot  Puffiness over index/middle fingers MCPs to PIPs but not over joints.   Right shoulder grossly normal.   Left shoulder improved ROM, now tender over AC joint   Bilateral knees with medial joint line tenderness, no crepitus, no effusion.  Medial distal knee tenderness- stable   Lymphadenopathy:      Cervical: No cervical adenopathy.   Skin:     General: Skin is warm and dry.      Findings: No erythema or rash.      Comments: No periungal erythema   No skin tightening noted over fingers/hands  No malar rash  Livedo reticularis (previously)   Neurological:      Mental Status: She is alert and oriented to person, place, and time.      Cranial Nerves: No cranial nerve deficit.      Gait: Gait normal.   Psychiatric:         Mood and Affect: Mood normal.         Behavior: Behavior normal.       ?  Radiology review:       MRI's reviewed and to be scanned into chart.       PROCEDURE: CT ABDOMEN TRIPHASIC LIVER (W+WO)(CPT=74170)       COMPARISON: [facility]  [facility], CT APPENDIX ABD PEL W CONTRAST (UXU=[id number]), 9/10/2021, 1:56 PM.  [facility], MRI LIVER (W+WO) (CPT=74183), 8/25/2021, 12:47 PM.       INDICATIONS: History of cystic liver lesion, status post surgical resection of segment VIII (10/11/2021).       TECHNIQUE: Multidetector CT images of the abdomen were obtained without and with non-ionic intravenous contrast material (in the arterial and portal venous phases). Automated exposure control for dose reduction was used. Adjustment of the mA and/or kV   was done based on the patient's size. Iterative reconstruction technique for dose reduction was employed. Water was ingested immediately prior to image acquisition; no oral contrast was ingested.       FINDINGS:   LUNG BASES: The heart is normal in size. There is dependent subsegmental atelectasis bilaterally.   LIVER: Postoperative changes are suggested with demonstration of a 2.7 x 1.9 x 3.6 cm residual hypodensity in segment VIII, intervally decreased from 7.6 x 6.9 x 6.5 cm previously; this has lobulated contours. Overlying capsular retraction is seen   extending to the right hemidiaphragmatic leaflet. No definite enhancement is identified.   Additional well-circumscribed cystic lesions are present, not significantly changed from prior exams.   BILIARY: The gallbladder is surgically absent with cholecystectomy clips in the gallbladder fossa. Extrahepatic biliary dilatation measures up to 0.8 cm.   PANCREAS: No lesion, fluid collection, ductal dilatation, or atrophy.     SPLEEN: No enlargement.     ADRENALS:   No defined mass or abnormal enlargement.     KIDNEYS:   Bilateral renal calculi are apparent. No ureteral calculi are identified in the imaged volume. There is no hydronephrosis or hydroureter. No perinephric or periureteric fat stranding is appreciated. Lobulated renal contours are seen,   suggestive of multifocal renal cortical scarring. Multiple well circumscribed renal  hypodensities are present and are not completely characterized, but statistically likely represent cysts. No suspicious enhancing renal lesions are evident. The   visualized portions of the ureters are normal in course and caliber without visualized filling defects on excretory phase imaging.   GI/MESENTERY: A small hiatal hernia is evident. Mild mucosal enhancement of the terminal ileum is suggested. There is no evidence of bowel obstruction. A normal caliber appendix is seen without inflammatory manifestations.     VASCULATURE:   No aneurysm is detected.   RETROPERITONEUM: No mass or lymphadenopathy is apparent.     BONES:   Multilevel degenerative changes are seen throughout the spine.   ABDOMINAL WALL: There is a minute fat-containing umbilical hernia superimposed on slight periumbilical diastasis.   OTHER: No free air or fluid is seen in the abdomen.                         Impression   CONCLUSION:   1. Postprocedural changes of partial cyst resection in segment VIII. The previously seen cystic lesion has partially involuted. No suspicious features are identified.       2. Additional scattered cystic hepatic lesions appear grossly stable.       3. Nonspecific mild mucosal enhancement of the terminal ileum, perhaps indicative of underlying ileitis. Correlation for relevant symptomatology is suggested.       4. Bilateral renal calculi are seen without evidence of obstructive uropathy.       5. Renal cortical defects, particular on the right, may reflect renal cortical scarring.       6. Status post cholecystectomy with mild extrahepatic biliary dilatation, perhaps related to the postcholecystectomy state.       7. Lesser incidental findings as above.         Dictated by (CST): Reinaldo Wilkes MD on 12/01/2021 at 2:44 PM       Finalized by (CST): Reinaldo Wilkes MD on 12/01/2021 at 2:56 PM             PROCEDURE: MRI LIVER (W+WO) (CPT=74183)       COMPARISON: None.       INDICATIONS: Liver cyst. Follow up.        TECHNIQUE: A comprehensive MRI examination of the abdomen was performed utilizing a variety of imaging planes and imaging parameters to optimize visualization of suspected pathology.  Images were obtained both before and after intravenous gadolinium   injection.         FINDINGS:   LIVER: There is a lobulated T2 hyperintense lesion which appears to be centered in the hepatic dome segment 8 measuring 7.0 x 7.6 x 5.8 cm. This may have some thin internal septation but otherwise no evidence of significant enhancement.  There are   multiple additional scattered smaller cysts seen throughout the liver.  There is a 0.7 cm T2 hyperintense lesion in the left lobe segment 2. This demonstrates homogeneous arterial enhancement which persists on delayed sequences and most likely reflects a    small flash filling meningioma.   GALLBLADDER: Gallbladder surgically absent.   BILIARY TREE: Common bile duct measures 0.8 cm with distal gradual tapering.  No intrahepatic biliary ductal dilatation.  There is low insertion of a cystic duct remanent noted which is not significantly dilated.   PANCREAS: Unremarkable.   SPLEEN: Unremarkable.   ADRENALS: Unremarkable.   KIDNEYS: Enhance symmetrically without hydronephrosis.  Cortical scarring noted in the interpolar right kidney.  A subcentimeter right lower pole renal cyst noted.   VASCULATURE:             Abdominal aorta is normal in caliber.  The main portal vein is patent.   LYMPHADENOPATHY: None.     BOWEL: Visualized bowel is nondilated.   BONES: Suboptimally evaluated but marrow signal is grossly unremarkable.   LUNG BASES: No significant finding.   OTHER: Negative.                     Impression   CONCLUSION:       7.6 cm cyst hepatic dome as described above with additional smaller scattered hepatic cysts.       Probable subcentimeter left hepatic flash filling hemangioma.       Cholecystectomy.       Mildly dilated common bile duct with distal gradual tapering.  Findings may  reflect post cholecystectomy change.  Advise appropriate laboratory correlation.       Small right renal cyst.               Dictated by (CST): Manny Sanchez MD on 8/25/2021 at 1:50 PM       Finalized by (CST): Manny Sanchez MD on 8/25/2021 at 2:12 PM        HISTORY: Persistent cough for 2 months.  Occasional shortness of breath.     TECHNIQUE:  2 view chest.     COMPARISON: 9/6/2014.     FINDINGS/   IMPRESSION:   Equivocal minor peribronchial thickening.  No discrete focal airspace consolidation or pleural effusion.     No overt enlargement of the cardiac silhouette.     FINAL REPORT   Attending Radiologist:  Maverick Irving MD   Date Signed Off:  12/26/2020 16:18          Labs:  Lab Results   Component Value Date    WBC 6.0 03/26/2025    RBC 4.73 03/26/2025    HGB 14.9 03/26/2025    HCT 43.2 03/26/2025    .0 03/26/2025    MCV 91.3 03/26/2025    MCH 31.5 03/26/2025    MCHC 34.5 03/26/2025    RDW 12.8 03/26/2025    NEPRELIM 2.79 03/26/2025    NEPERCENT 46.3 03/26/2025    LYPERCENT 40.7 03/26/2025    MOPERCENT 9.8 03/26/2025    EOPERCENT 1.7 03/26/2025    BAPERCENT 1.2 03/26/2025    NE 2.79 03/26/2025    LYMABS 2.45 03/26/2025    MOABSO 0.59 03/26/2025    EOABSO 0.10 03/26/2025    BAABSO 0.07 03/26/2025     Lab Results   Component Value Date    GLU 88 03/26/2025    BUN 10 03/26/2025    BUNCREA 12.5 03/26/2025    CREATSERUM 0.80 03/26/2025    ANIONGAP 7 03/26/2025    GFR >59 12/04/2010    GFRNAA 93 08/01/2022    GFRAA 108 08/01/2022    CA 9.4 03/26/2025    OSMOCALC 286 03/26/2025    ALKPHO 84 03/26/2025    AST 23 03/26/2025    ALT 32 03/26/2025    BILT 0.3 03/26/2025    TP 7.0 03/26/2025    ALB 4.4 03/26/2025    GLOBULIN 2.6 03/26/2025    AGRATIO 1.8 12/04/2010     03/26/2025    K 3.6 03/26/2025     03/26/2025    CO2 28.0 03/26/2025       Additional Labs:  11/2024  TB negative  ESR 14 normal  CRP normal  CMP grossly normal  CBC grossly normal    07/2024  Adalimumab antibody negative  Adalimumab  level 17  ESR 24 borderline elevated  CRP normal  CMP grossly normal  CBC grossly normal    04/2024  CRP normal  ESR 24 borderline  CMP grossly normal  CBC grossly normal  Iron studies normal     01/2024  CRP normal  ESR 21 borderline  CMP grossly normal  CBC grossly normal    11/2023  HLA-B27 negative  ESR 39 elevated  CRP 0.5 normal  CMP grossly normal  CBC grossly normal  HCV nonreactive  Hep B core antibody, IgM, total nonreactive  Hepatitis surface antibody reactive  QuantiFERON-TB negative    08/2023  CBC with WBC 3.4 borderline low  CRP normal  ESR 37 elevated    09/2023  CRP 0.42 borderline   ESR 20 normal     08/2023  CRP normal  ESR 25 elevated  B12 448 normal     07/2023  ESR 33 borderline  CRP 0.32 borderline  CBC grossly normal  CMP grossly normal    06/2023  ESR 29 borderline  CRP 0.32 borderline  CBC grossly normal  CMP grossly normal  Vit D 24.7 low    04/2023  ESR 44 elevated  CRP 0.82 elevated    01/2023  IgA normal  U1 RNP negative  RNP 70 negative  ESR 16 normal  CRP 0.34 borderline  CMP grossly normal  CBC grossly normal  Vit D 25.8 low    11/11/2022  ESR 31 elevated  CRP 0.69 elevated  CMP grossly normal  CBC grossly normal    08/2022  Vit D 24.4 low  ESR 13  CRP 0.59 borderline   RNA polymerase III 59 moderate positive  EVIN by IFA negative    04/2022  EVIN/EVIN panel negative  ESR 16 normal  CRP 0.43 borderline  RNA polymerase III 64 moderate positive  EVIN by IFA negative    02/2022  EVIN by IFA  CRISTELA Panel negative  CRP 0.54 elevated   ESR 16 normal   RNA virginia III 60 moderate positive     10/2021  ANCA negative (MPO/PR3) negative  ESR 15 normal   CRP 0.31  Cryoglobulin negative   SPEP grossly negative   CBC grossly normal   CMP grossly normal   IgA, IgG, IgM normal   Total complement normal   B12 525 normal  AVISE grossly negative with exception of equivocal RNA virginia III    04/2021  CK normal  C3 152 normal  C4 24.1 normal  dsDNA negative  SSA SSB negative  Smith moderate positive  Queen/RNP  moderate positive  EVIN by IFA negative    08/2020  Vitamin D 32.5  B12 384  ESR 7 normal  CRP 0.37 borderline    04/2019  B2 G negative  RF negative  CCP negative  EBV IgG, AG ABS positive; IgA negative  MPO, NY-3, ANCA negative  SPEP grossly normal  Cryoglobulin negative  LAC negative  ACL negative  HIV negative  HCV nonreactive  Hepatitis B serologies normal    Maggie Sy, DO  [facility]  04/09/2025

## 2025-04-11 NOTE — PATIENT INSTRUCTIONS
-- continue injectable methotrexate weekly  -- continue folic acid 1mg daily   -- continue adalimumab every 14 days  -- remember to hold meds if infection present  -- will get you back on schedule for left shoulder injection  -- continue cyclobenzaprine as needed  -- get updated labs and will include antibodies against the adalimumab  -- try liquid vitamin d supplementation   -- follow up in 3 months or sooner as needed  -- attempt smoking cessation or at least cutting back as this is likely worsening your inflammation     Dr. Sy

## 2025-05-19 DIAGNOSIS — M62.838 MUSCLE SPASM: Primary | ICD-10-CM

## 2025-05-19 DIAGNOSIS — M25.50 POLYARTHRALGIA: ICD-10-CM

## 2025-05-19 NOTE — TELEPHONE ENCOUNTER
Last office visit: 4/9/2025     Next Rheum Apt:7/9/2025 Maggie Sy DO    Last fill: cyclobenzaprine 5 mg  4/9/2025  60 tablets, 0 refills     Labs:   Lab Results   Component Value Date    CREATSERUM 0.80 03/26/2025    GFR >59 12/04/2010    ALKPHO 84 03/26/2025    AST 23 03/26/2025    ALT 32 03/26/2025    BILT 0.3 03/26/2025    TP 7.0 03/26/2025    ALB 4.4 03/26/2025       Lab Results   Component Value Date    WBC 6.0 03/26/2025    HGB 14.9 03/26/2025    .0 03/26/2025    NEPRELIM 2.79 03/26/2025    NEPERCENT 46.3 03/26/2025    LYPERCENT 40.7 03/26/2025    NE 2.79 03/26/2025    LYMABS 2.45 03/26/2025

## 2025-05-20 RX ORDER — CYCLOBENZAPRINE HCL 5 MG
5 TABLET ORAL 2 TIMES DAILY PRN
Qty: 60 TABLET | Refills: 0 | OUTPATIENT
Start: 2025-05-20

## 2025-05-23 ENCOUNTER — PATIENT MESSAGE (OUTPATIENT)
Dept: RHEUMATOLOGY | Facility: CLINIC | Age: 45
End: 2025-05-23

## 2025-05-23 DIAGNOSIS — M25.50 POLYARTHRALGIA: Primary | ICD-10-CM

## 2025-05-23 RX ORDER — METHYLPREDNISOLONE 4 MG/1
TABLET ORAL
Qty: 1 EACH | Refills: 0 | Status: SHIPPED | OUTPATIENT
Start: 2025-05-23

## 2025-07-05 ENCOUNTER — HOSPITAL ENCOUNTER (OUTPATIENT)
Age: 45
Discharge: HOME | End: 2025-07-05
Payer: COMMERCIAL

## 2025-07-05 ENCOUNTER — APPOINTMENT (OUTPATIENT)
Dept: CT IMAGING | Age: 45
End: 2025-07-05
Attending: NURSE PRACTITIONER
Payer: COMMERCIAL

## 2025-07-05 VITALS
DIASTOLIC BLOOD PRESSURE: 61 MMHG | RESPIRATION RATE: 18 BRPM | HEART RATE: 97 BPM | TEMPERATURE: 98 F | OXYGEN SATURATION: 99 % | SYSTOLIC BLOOD PRESSURE: 132 MMHG

## 2025-07-05 DIAGNOSIS — R10.9 ABDOMINAL PAIN, ACUTE: Primary | ICD-10-CM

## 2025-07-05 LAB
#MXD IC: 0.7 X10ˆ3/UL (ref 0.1–1)
BILIRUB UR QL STRIP: NEGATIVE
BUN BLD-MCNC: 10 MG/DL (ref 7–18)
CHLORIDE BLD-SCNC: 106 MMOL/L (ref 98–112)
CLARITY UR: CLEAR
CO2 BLD-SCNC: 22 MMOL/L (ref 21–32)
COLOR UR: YELLOW
CREAT BLD-MCNC: 0.7 MG/DL (ref 0.55–1.02)
EGFRCR SERPLBLD CKD-EPI 2021: 109 ML/MIN/1.73M2 (ref 60–?)
GLUCOSE BLD-MCNC: 95 MG/DL (ref 70–99)
GLUCOSE UR STRIP-MCNC: NEGATIVE MG/DL
HCT VFR BLD AUTO: 44.6 % (ref 35–48)
HCT VFR BLD CALC: 47 % (ref 34–50)
HGB BLD-MCNC: 14.8 G/DL (ref 12–16)
HGB UR QL STRIP: NEGATIVE
ISTAT IONIZED CALCIUM FOR CHEM 8: 1.16 MMOL/L (ref 1.12–1.32)
KETONES UR STRIP-MCNC: NEGATIVE MG/DL
LEUKOCYTE ESTERASE UR QL STRIP: NEGATIVE
LYMPHOCYTES # BLD AUTO: 2.5 X10ˆ3/UL (ref 1–4)
LYMPHOCYTES NFR BLD AUTO: 34.5 %
MCH RBC QN AUTO: 30.8 PG (ref 26–34)
MCHC RBC AUTO-ENTMCNC: 33.2 G/DL (ref 31–37)
MCV RBC AUTO: 92.7 FL (ref 80–100)
MIXED CELL %: 9.6 %
NEUTROPHILS # BLD AUTO: 4.1 X10ˆ3/UL (ref 1.5–7.7)
NEUTROPHILS NFR BLD AUTO: 55.9 %
NITRITE UR QL STRIP: NEGATIVE
PH UR STRIP: 5.5 [PH]
PLATELET # BLD AUTO: 299 X10ˆ3/UL (ref 150–450)
POTASSIUM BLD-SCNC: 3.8 MMOL/L (ref 3.6–5.1)
PROT UR STRIP-MCNC: NEGATIVE MG/DL
RBC # BLD AUTO: 4.81 X10ˆ6/UL (ref 3.8–5.3)
SODIUM BLD-SCNC: 139 MMOL/L (ref 136–145)
SP GR UR STRIP: 1.01
UROBILINOGEN UR STRIP-ACNC: <2 MG/DL
WBC # BLD AUTO: 7.3 X10ˆ3/UL (ref 4–11)

## 2025-07-05 PROCEDURE — 81002 URINALYSIS NONAUTO W/O SCOPE: CPT

## 2025-07-05 PROCEDURE — 74177 CT ABD & PELVIS W/CONTRAST: CPT | Performed by: STUDENT IN AN ORGANIZED HEALTH CARE EDUCATION/TRAINING PROGRAM

## 2025-07-05 PROCEDURE — 99215 OFFICE O/P EST HI 40 MIN: CPT

## 2025-07-05 PROCEDURE — 80047 BASIC METABLC PNL IONIZED CA: CPT

## 2025-07-05 PROCEDURE — 85025 COMPLETE CBC W/AUTO DIFF WBC: CPT | Performed by: NURSE PRACTITIONER

## 2025-07-05 PROCEDURE — 36415 COLL VENOUS BLD VENIPUNCTURE: CPT

## 2025-07-05 NOTE — ED PROVIDER NOTES
Patient Seen in: [facility]        History  Chief Complaint   Patient presents with    Eval-G     Stated Complaint: Abdominal Pain - Pain right side near hip    Subjective:   HPI            45-year-old female presents with right lower quadrant abdominal pain x 1 week.  Patient states gotten worse over the past couple of days.  There is increased pain with movement and changing position.  Patient denies fever.  There is no nausea or vomiting.      Objective:     Past Medical History:    Abdominal distention    Abdominal hernia    ABDOMINAL PAIN    mild cramping    Abdominal pain    Anxiety    Arthritis    Atypical mole    Back pain    Bad breath    Belching    Benign neoplasm of rectum    Bloating    Blood in the stool    Calculus of kidney    Cardiomegaly    CERVICAL DYSPLASIA    Change in hair    Chest pain    Constipation    Decorative tattoo    DEPRESSION    Diabetes mellitus (HCC)    gestational    Diarrhea, unspecified    Endometriosis    Enlarged lymph node    Fatigue    Flatulence/gas pain/belching    Food intolerance    Frequent urination    Frequent UTI    GERD    GERD (gastroesophageal reflux disease)    Gestational diabetes (HCC)         Headache disorder    Hearing loss    Heartburn    Hemorrhoids    History of depression    IBS (irritable bowel syndrome)    Indigestion    Irregular bowel habits    Kidney disease    Leaking of urine    Migraines    Mouth sores    Nausea    Night sweats    OBESITY    Pain in joints    Pain with bowel movements    Painful swallowing    PONV (postoperative nausea and vomiting)    Problems with swallowing    Rash    Skin blushing/flushing    Sleep disturbance    Stool incontinence    Thyroid nodule    Ulcer              Past Surgical History:   Procedure Laterality Date       and     Cholecystectomy  2009    Colonoscopy      Colposcopy,bx cervix/endocerv curr  2003    moderate dysplasia    Cysto/uretero w/lithotripsy Right 2014     Procedure: LITHOTRIPSY HOLMIUM LASER WITH CYSTOSCOPY;  Surgeon: Thierno Tovar DO;  Location: [facility]    Cystouretro w/stone remove Right 09/02/2014    Procedure: CYSTOSCOPY WITH RETROGRADE PYELOGRAM;  Surgeon: Thierno Tovar DO;  Location: [facility]    D & c  09/14/2012    Egd      Fragmenting of kidney stone Right 08/19/2014    Procedure: LITHOTRIPSY WITH CYSTOSCOPY, STENT PLACEMENT;  Surgeon: Thierno Tovar DO;  Location: [facility]    Hysterectomy  10/2022    Leep  2003    Lysis of adhesions      Desiree localization wire 1 site right (cpt=19281)      age 26 had Right biopsyexciosnal; fibrocystic tissue    Needle biopsy liver      Other surgical history  2005    Rt breast biopsy    Other surgical history  08/19/2014    Right ESWL- Dr. Webb    Other surgical history  09/02/2014    Right ESWL- Dr. Tovar    Other surgical history  09/08/2014    Cysto Stent Removal- Dr. Tovar    Tonsillectomy  1990    Total abdom hysterectomy      X-ray retrograde pyelogram Right 09/02/2014    Procedure: CYSTOSCOPY/URETEROSCOPY/STONE EXTRACTION;  Surgeon: Thierno Tovar DO;  Location: [facility]                Social History     Socioeconomic History    Marital status:    Tobacco Use    Smoking status: Every Day     Current packs/day: 1.00     Average packs/day: 1 pack/day for 15.0 years (15.0 ttl pk-yrs)     Types: Cigarettes    Smokeless tobacco: Never   Vaping Use    Vaping status: Never Used   Substance and Sexual Activity    Alcohol use: Not Currently    Drug use: No    Sexual activity: Yes     Partners: Male     Social Drivers of Health      Received from [facility]              Review of Systems    Positive for stated complaint: Abdominal Pain - Pain right side near hip  Other systems are as noted in HPI.  Constitutional and vital signs reviewed.      All other systems reviewed and negative except as noted  above.                  Physical Exam    ED Triage Vitals [07/05/25 1035]   /61   Pulse 97   Resp 18   Temp 98.3 °F (36.8 °C)   Temp src Oral   SpO2 99 %   O2 Device None (Room air)       Current Vitals:   Vital Signs  BP: 132/61  Pulse: 97  Resp: 18  Temp: 98.3 °F (36.8 °C)  Temp src: Oral    Oxygen Therapy  SpO2: 99 %  O2 Device: None (Room air)            Physical Exam  Vitals reviewed.   Constitutional:       General: She is not in acute distress.  HENT:      Nose: Nose normal.      Mouth/Throat:      Mouth: Mucous membranes are moist.   Cardiovascular:      Rate and Rhythm: Normal rate and regular rhythm.   Pulmonary:      Effort: Pulmonary effort is normal.      Breath sounds: Normal breath sounds.   Abdominal:      Tenderness: There is abdominal tenderness.      Comments: + tenderness RLQ   Skin:     General: Skin is warm and dry.   Neurological:      General: No focal deficit present.      Mental Status: She is alert and oriented to person, place, and time.   Psychiatric:         Mood and Affect: Mood normal.         Behavior: Behavior normal.                 ED Course  Labs Reviewed   POCT ISTAT CHEM8 CARTRIDGE - Normal   POCT CBC   EM POCT URINALYSIS DIPSTICK          POCT Urinalysis Dipstick        Component Value Flag Ref Range Units Status    Urine Color Yellow      Yellow  Final    Urine Clarity Clear      Clear  Final    Specific Gravity, Urine 1.010      1.005 - 1.030  Final    PH, Urine 5.5      5.0 - 8.0  Final    Protein urine Negative      Negative mg/dL Final    Glucose, Urine Negative      Negative mg/dL Final    Ketone, Urine Negative      Negative mg/dL Final    Bilirubin, Urine Negative      Negative  Final    Blood, Urine Negative      Negative  Final    Nitrite Urine Negative      Negative  Final    Urobilinogen urine <2.0      <2.0 mg/dL Final    Leukocyte esterase urine Negative      Negative  Final                  POCT CBC        Component Value Flag Ref Range Units Status     WBC IC 7.3      4.0 - 11.0 x10ˆ3/uL Final    RBC IC 4.81      3.80 - 5.30 X10ˆ6/uL Final    HGB IC 14.8      12.0 - 16.0 g/dL Final    HCT IC 44.6      35.0 - 48.0 % Final    MCV IC 92.7      80.0 - 100.0 fL Final    MCH IC 30.8      26.0 - 34.0 pg Final    MCHC IC 33.2      31.0 - 37.0 g/dL Final    PLT .0      150.0 - 450.0 X10ˆ3/uL Final    # Neutrophil 4.1      1.5 - 7.7 X10ˆ3/uL Final    # Lymphocyte 2.5      1.0 - 4.0 X10ˆ3/uL Final    # Mixed Cells 0.7      0.1 - 1.0 X10ˆ3/uL Final    Neutrophil % 55.9       % Final    Lymphocyte % 34.5       % Final    Mixed Cell % 9.6       % Final                  CT ABDOMEN+PELVIS(CONTRAST ONLY)(CPT=74177)          PROCEDURE(S): CT ABDOMEN+PELVIS(CONTRAST ONLY)(CPT=74177)    WORKSTATION: [id number]    HISTORY/INDICATIONS: Abdominal Pain - Pain right side near hip    COMPARISON: 08/27/2022 CT ABDOMEN + PELVIS KIDNEYSTONE 2D RNDR (NO IV NO ORAL) (CPT=741      TECHNIQUE: Helical CT of the abdomen and pelvis was obtained following administration of intravenous contrast material. Axial, coronal, and sagittal reformatted images were created and interpreted.    For this exam, one or more of the following dose reductions techniques were used:  Automated exposure control  Adjustment of the mA and/or kV according to patient size  Use of iterative reconstruction technique      FINDINGS:    Lower Thorax: The lung bases are clear. The heart size is normal.    Liver: There is a benign cyst in the posterior right hepatic lobe. Additional hypodensities in the liver are favored to represent cysts.    Gallbladder/Biliary tree: The gallbladder is surgically absent. No biliary ductal dilatation.    Pancreas:  Unremarkable.    Spleen: Unremarkable.    Adrenal glands:  Unremarkable.    Kidneys: Symmetric nephrograms. No suspicious renal lesions. No hydronephrosis. Focal cortical thinning in the right kidney, probably scarring. Intravenous contrast limits evaluation for small renal stones.  Probable nonobstructing stones in the right   kidney series 2 image 53 and the left kidney series 2 image 66.    Retroperitoneum/extraperitoneum: Unremarkable.    Vasculature: No aneurysm or dissection.    Peritoneum:  Unremarkable.    GI tract/mesentery/omentum: There is no bowel obstruction. The appendix is unremarkable.    Urinary Bladder: The bladder is grossly unremarkable.     Reproductive organs: The uterus is not seen. There is no adnexal mass.    Body wall: No acute abnormality.    Bones: No acute fracture. There are mild degenerative changes in the spine.      =====  CONCLUSION:    No acute abnormality in the abdomen or pelvis.    No findings to explain abdominal pain or right hip pain. Suspected small nonobstructing renal stones. No hydronephrosis. No bowel obstruction. The appendix is unremarkable. The gallbladder is surgically absent. No biliary ductal dilatation. No CT   evidence of acute pancreatitis.    No acute fracture.              Electronically Verified and Signed by Attending Radiologist: Cristobal Mendoza MD 7/5/2025 12:55 PM  Workstation: [id number]         POCT ISTAT chem8 cartridge        Component Value Flag Ref Range Units Status    ISTAT Sodium 139      136 - 145 mmol/L Final    ISTAT BUN 10      7 - 18 mg/dL Final    ISTAT Potassium 3.8      3.6 - 5.1 mmol/L Final    ISTAT Chloride 106      98 - 112 mmol/L Final    ISTAT Ionized Calcium 1.16      1.12 - 1.32 mmol/L Final    ISTAT Hematocrit 47      34 - 50 % Final    ISTAT Glucose 95      70 - 99 mg/dL Final    ISTAT TCO2 22      21 - 32 mmol/L Final    ISTAT Creatinine 0.70      0.55 - 1.02 mg/dL Final    Comment:    This test may exhibit falsely elevated results when a sample is collected from a patient who is presently taking Hydroxyurea. If patient is consuming Hydroxyurea, i-STAT creatinine analysis should be considered inaccurate and a sample should be referred to the Central Lab for analysis, if clinically indicated.    eGFR-Cr 109       >=60 mL/min/1.73m2 Final    Comment:    eGFR calculated using the CKD-EPI 2021 calculation                                    MDM             Medical Decision Making  45-year-old female presents with right lower quadrant pain.  Differential diagnosis includes appendicitis, viral gastroenteritis, diverticulitis, ovarian cyst.  On exam patient does have tenderness to palpate right lower quadrant.  There is increased pain with movement.  CBC and chemistry are both within normal limits.  Urine shows no UTI.  Abdominal CT was done and shows no acute abnormalities for cause of abdominal pain.  The results were discussed with patient.  She was reassured but also informed that the cause of her pain is unclear.  She was given instructions when to go to the emergency room.  She was also given instructions to follow-up with her primary care doctor.    Amount and/or Complexity of Data Reviewed  Labs: ordered.     Details: POC CBC is WNL  POC chem is WNL  POC urine shows no UTI  Radiology: ordered.     Details: Abd CT images reviewed by IC provider.  Shows no acute abnormalities for cause of abdominal pain    Risk  OTC drugs.        Disposition and Plan     Clinical Impression:  1. Abdominal pain, acute         Disposition:  Discharge  7/5/2025  1:16 pm    Follow-up:  Daisy Thakkar[address]  [place] [address] [phone]      If symptoms worsen          Medications Prescribed:  Current Discharge Medication List                Supplementary Documentation:

## 2025-07-05 NOTE — ED INITIAL ASSESSMENT (HPI)
Rlq pain for few weeks, r groin pain since Thursday, no flank or back pain, on n/v/d, no urinary symptoms, no fever

## 2025-07-08 ENCOUNTER — LAB ENCOUNTER (OUTPATIENT)
Dept: LAB | Facility: REFERENCE LAB | Age: 45
End: 2025-07-08
Attending: INTERNAL MEDICINE
Payer: COMMERCIAL

## 2025-07-08 DIAGNOSIS — Z79.899 IMMUNOCOMPROMISED STATE DUE TO DRUG THERAPY (HCC): ICD-10-CM

## 2025-07-08 DIAGNOSIS — Z79.899 HIGH RISK MEDICATION USE: ICD-10-CM

## 2025-07-08 DIAGNOSIS — M35.9 UNDIFFERENTIATED CONNECTIVE TISSUE DISEASE (HCC): ICD-10-CM

## 2025-07-08 DIAGNOSIS — E55.9 VITAMIN D DEFICIENCY: ICD-10-CM

## 2025-07-08 DIAGNOSIS — Z79.620 LONG-TERM USE OF ADALIMUMAB: ICD-10-CM

## 2025-07-08 DIAGNOSIS — D84.821 IMMUNOCOMPROMISED STATE DUE TO DRUG THERAPY (HCC): ICD-10-CM

## 2025-07-08 LAB
ALBUMIN SERPL-MCNC: 4.5 G/DL (ref 3.2–4.8)
ALBUMIN/GLOB SERPL: 2 {RATIO} (ref 1–2)
ALP LIVER SERPL-CCNC: 79 U/L (ref 37–98)
ALT SERPL-CCNC: 20 U/L (ref 10–49)
ANION GAP SERPL CALC-SCNC: 7 MMOL/L (ref 0–18)
AST SERPL-CCNC: 19 U/L (ref ?–34)
BASOPHILS # BLD AUTO: 0.05 X10(3) UL (ref 0–0.2)
BASOPHILS NFR BLD AUTO: 0.7 %
BILIRUB SERPL-MCNC: 0.2 MG/DL (ref 0.3–1.2)
BUN BLD-MCNC: 15 MG/DL (ref 9–23)
BUN/CREAT SERPL: 18.8 (ref 10–20)
CALCIUM BLD-MCNC: 9.3 MG/DL (ref 8.7–10.4)
CHLORIDE SERPL-SCNC: 107 MMOL/L (ref 98–112)
CO2 SERPL-SCNC: 23 MMOL/L (ref 21–32)
CREAT BLD-MCNC: 0.8 MG/DL (ref 0.55–1.02)
CRP SERPL-MCNC: <0.5 MG/DL (ref ?–0.5)
DEPRECATED RDW RBC AUTO: 47.1 FL (ref 35.1–46.3)
EGFRCR SERPLBLD CKD-EPI 2021: 93 ML/MIN/1.73M2 (ref 60–?)
EOSINOPHIL # BLD AUTO: 0.12 X10(3) UL (ref 0–0.7)
EOSINOPHIL NFR BLD AUTO: 1.6 %
ERYTHROCYTE [DISTWIDTH] IN BLOOD BY AUTOMATED COUNT: 13.8 % (ref 11–15)
ERYTHROCYTE [SEDIMENTATION RATE] IN BLOOD: 22 MM/HR (ref 0–20)
FASTING STATUS PATIENT QL REPORTED: NO
GLOBULIN PLAS-MCNC: 2.2 G/DL (ref 2–3.5)
GLUCOSE BLD-MCNC: 96 MG/DL (ref 70–99)
HCT VFR BLD AUTO: 40.3 % (ref 35–48)
HGB BLD-MCNC: 13.3 G/DL (ref 12–16)
IMM GRANULOCYTES # BLD AUTO: 0.01 X10(3) UL (ref 0–1)
IMM GRANULOCYTES NFR BLD: 0.1 %
LYMPHOCYTES # BLD AUTO: 2.78 X10(3) UL (ref 1–4)
LYMPHOCYTES NFR BLD AUTO: 37.1 %
MCH RBC QN AUTO: 30.2 PG (ref 26–34)
MCHC RBC AUTO-ENTMCNC: 33 G/DL (ref 31–37)
MCV RBC AUTO: 91.6 FL (ref 80–100)
MONOCYTES # BLD AUTO: 0.71 X10(3) UL (ref 0.1–1)
MONOCYTES NFR BLD AUTO: 9.5 %
NEUTROPHILS # BLD AUTO: 3.82 X10 (3) UL (ref 1.5–7.7)
NEUTROPHILS # BLD AUTO: 3.82 X10(3) UL (ref 1.5–7.7)
NEUTROPHILS NFR BLD AUTO: 51 %
OSMOLALITY SERPL CALC.SUM OF ELEC: 285 MOSM/KG (ref 275–295)
PLATELET # BLD AUTO: 296 10(3)UL (ref 150–450)
POTASSIUM SERPL-SCNC: 3.8 MMOL/L (ref 3.5–5.1)
PROT SERPL-MCNC: 6.7 G/DL (ref 5.7–8.2)
RBC # BLD AUTO: 4.4 X10(6)UL (ref 3.8–5.3)
SODIUM SERPL-SCNC: 137 MMOL/L (ref 136–145)
VIT D+METAB SERPL-MCNC: 31.8 NG/ML (ref 30–100)
WBC # BLD AUTO: 7.5 X10(3) UL (ref 4–11)

## 2025-07-08 PROCEDURE — 82397 CHEMILUMINESCENT ASSAY: CPT | Performed by: INTERNAL MEDICINE

## 2025-07-08 PROCEDURE — 85025 COMPLETE CBC W/AUTO DIFF WBC: CPT | Performed by: INTERNAL MEDICINE

## 2025-07-08 PROCEDURE — 85652 RBC SED RATE AUTOMATED: CPT | Performed by: INTERNAL MEDICINE

## 2025-07-08 PROCEDURE — 86140 C-REACTIVE PROTEIN: CPT | Performed by: INTERNAL MEDICINE

## 2025-07-08 PROCEDURE — 80299 QUANTITATIVE ASSAY DRUG: CPT | Performed by: INTERNAL MEDICINE

## 2025-07-08 PROCEDURE — 80053 COMPREHEN METABOLIC PANEL: CPT | Performed by: INTERNAL MEDICINE

## 2025-07-08 PROCEDURE — 82306 VITAMIN D 25 HYDROXY: CPT | Performed by: INTERNAL MEDICINE

## 2025-07-09 ENCOUNTER — OFFICE VISIT (OUTPATIENT)
Dept: RHEUMATOLOGY | Facility: CLINIC | Age: 45
End: 2025-07-09
Payer: COMMERCIAL

## 2025-07-09 VITALS
BODY MASS INDEX: 36.82 KG/M2 | RESPIRATION RATE: 16 BRPM | WEIGHT: 195 LBS | SYSTOLIC BLOOD PRESSURE: 128 MMHG | OXYGEN SATURATION: 98 % | DIASTOLIC BLOOD PRESSURE: 80 MMHG | HEART RATE: 94 BPM | HEIGHT: 61 IN | TEMPERATURE: 98 F

## 2025-07-09 DIAGNOSIS — M25.512 CHRONIC LEFT SHOULDER PAIN: ICD-10-CM

## 2025-07-09 DIAGNOSIS — R70.0 ELEVATED SED RATE: ICD-10-CM

## 2025-07-09 DIAGNOSIS — Z79.899 HIGH RISK MEDICATION USE: ICD-10-CM

## 2025-07-09 DIAGNOSIS — Z79.899 IMMUNOCOMPROMISED STATE DUE TO DRUG THERAPY (HCC): ICD-10-CM

## 2025-07-09 DIAGNOSIS — M35.9 UNDIFFERENTIATED CONNECTIVE TISSUE DISEASE (HCC): Primary | ICD-10-CM

## 2025-07-09 DIAGNOSIS — M25.50 POLYARTHRALGIA: ICD-10-CM

## 2025-07-09 DIAGNOSIS — G89.29 CHRONIC LEFT SHOULDER PAIN: ICD-10-CM

## 2025-07-09 DIAGNOSIS — M25.551 CHRONIC RIGHT HIP PAIN: ICD-10-CM

## 2025-07-09 DIAGNOSIS — Z79.620 LONG-TERM USE OF ADALIMUMAB: ICD-10-CM

## 2025-07-09 DIAGNOSIS — M75.02 ADHESIVE CAPSULITIS OF LEFT SHOULDER: ICD-10-CM

## 2025-07-09 DIAGNOSIS — Z71.6 ENCOUNTER FOR SMOKING CESSATION COUNSELING: ICD-10-CM

## 2025-07-09 DIAGNOSIS — H65.92 LEFT NON-SUPPURATIVE OTITIS MEDIA: ICD-10-CM

## 2025-07-09 DIAGNOSIS — R53.82 CHRONIC FATIGUE: ICD-10-CM

## 2025-07-09 DIAGNOSIS — Z11.1 SCREENING FOR TUBERCULOSIS: ICD-10-CM

## 2025-07-09 DIAGNOSIS — Z86.69 HISTORY OF IRITIS: ICD-10-CM

## 2025-07-09 DIAGNOSIS — D84.821 IMMUNOCOMPROMISED STATE DUE TO DRUG THERAPY (HCC): ICD-10-CM

## 2025-07-09 DIAGNOSIS — G89.29 CHRONIC RIGHT HIP PAIN: ICD-10-CM

## 2025-07-09 PROCEDURE — 3079F DIAST BP 80-89 MM HG: CPT | Performed by: INTERNAL MEDICINE

## 2025-07-09 PROCEDURE — 3074F SYST BP LT 130 MM HG: CPT | Performed by: INTERNAL MEDICINE

## 2025-07-09 PROCEDURE — G2211 COMPLEX E/M VISIT ADD ON: HCPCS | Performed by: INTERNAL MEDICINE

## 2025-07-09 PROCEDURE — 3008F BODY MASS INDEX DOCD: CPT | Performed by: INTERNAL MEDICINE

## 2025-07-09 PROCEDURE — 99214 OFFICE O/P EST MOD 30 MIN: CPT | Performed by: INTERNAL MEDICINE

## 2025-07-09 RX ORDER — CLINDAMYCIN PHOSPHATE 10 UG/ML
LOTION TOPICAL
COMMUNITY
Start: 2025-04-09

## 2025-07-09 RX ORDER — METHYLPREDNISOLONE 4 MG/1
TABLET ORAL
Qty: 1 EACH | Refills: 0 | Status: SHIPPED | OUTPATIENT
Start: 2025-07-09

## 2025-07-09 RX ORDER — LEUCOVORIN CALCIUM 5 MG/1
TABLET ORAL
Qty: 12 TABLET | Refills: 0 | Status: SHIPPED | OUTPATIENT
Start: 2025-07-09

## 2025-07-09 NOTE — PROGRESS NOTES
?  RHEUMATOLOGY FOLLOW UP   Date of visit: 07/09/2025    Chief Complaint   Patient presents with    Undifferentiated Connective Tissue Disease     3 month f/u. Not feeling great. Had a flare recently. Currently having some hand swelling and upper arm pain. Right hip pain that brought her to the urgent care this week. Sitting and standing makes it worse. Converted rapid score of 2.3     ?  ASSESSMENT, DISCUSSION & PLAN   Assessment:  1. Undifferentiated connective tissue disease (HCC)    2. Immunocompromised state due to drug therapy (HCC)    3. High risk medication use    4. Chronic left shoulder pain    5. Adhesive capsulitis of left shoulder    6. Long-term use of adalimumab    7. Chronic fatigue    8. Encounter for smoking cessation counseling    9. Elevated sed rate    10. History of iritis    11. Chronic right hip pain    12. Left non-suppurative otitis media    13. Screening for tuberculosis    14. Polyarthralgia              Discussion:  Ms. Amelie Gonzalez is a 46 yo woman with a plethora of complaints.  It seems that she was previously diagnosed with potentially lupus given Smith antibody positivity, however her EVIN was normal, complements normal as well as inflammatory marker were normal.  Patient does have a history of livedo reticularis and possible Raynaud's.  She also complains of rashes, oral/nasal ulcerations, joint pain with swelling as well as multisystem complaints.  It seems like she was previously put on Plaquenil and had facial rash, however unclear if rash was connected to medication itself.  She lacks any overt signs of synovitis on her exam today.  Her labs were grossly negative for autoimmune etiology to her livedo and Raynaud's. She had positive RNA virginia III testing but EVIN and otherwise negative workup.  She was seen by rheumatology at - said unlikely scleroderma. Recommended hand US if swelling recurs. Knee xrays consistent with mild arthritis. Recommended ophthalmology exam and to have  schirmer testing done, would consider salivary lip biopsy vs parotid US for eval for Sjogren's. Strongly recommended IBD evaluation     She was previously seen by GI and colonoscopy was negative for any active colitis, endoscopy was consistent with gastritis.  She was given Xifaxan for IBS D which has improved her nausea.  Feels like her diarrhea has improved after the last set of steroids that I ordered.  In terms of neurology, she did undergo spinal tap and was told that testing was negative for multiple sclerosis.  Unclear if there has been any etiology found to her small fiber neuropathy.    She was started on Humira previously due to worsened iritis/uveitis. She has tolerated this with improvement of eye inflammation - had to change to hyrimoz insurance preference.   She has had some intermittent flaring of the inflammatory eye disease but this typically correlates with when she has been off of the biologic due to procedure/infection.    At this time, she has been feeling stable with exception of the left shoulder and right hip. On exam, I worry with her difficulty moving on the table and the right hip mobility. This has been affecting her sleep and gait. Due to the tenderness and location, I worry about a labral tear vs bursitis vs inflammation in the joint itself vs AVN (needs to be considered due to constant nature of pain and hx of frequent steroid use as well as smoking history).   And the shoulder has a hx of frozen shoulder s/p injection which helped somewhat but has persistent pain and difficulty with certain motions. I query rotator cuff disease.   Will obtain MRI of both the left shoulder and right hip to assess these areas further.     She has been tolerating injectable methotrexate, still with some GI upset and fatigue but seems better compared to the oral formulation. She does have severe fatigue for 24 hours after injecting. Will trial leucovorin weekly instead of the daily folic acid and see if  that helps.   Her exam does not suggest synovitis but her labs showed elevated ESR normal CRP- she feels some left ear pain, ringing and hearing loss- on exam looks like early infection so medrol dose pack and augumentin ordered- she confirms no issues with amoxicillin (has multiple allergies)  Also recommended she start vit d 2000IU daily OTC.     Will have her continue her current regimen.   Awaiting adalimumab levels- if antidrug antibodies present, will need to change biologic.   Also strongly recommended that she work on smoking cessation send this is likely contributing to her difficult to control autoimmune disease.    Follow-up in 3 to 4 months or sooner as needed  Encouraged to reach out if symptoms change or worsen in the meantime    adalimumab is an injectable TNF-alpha inhibitor, which is a humanized monoclonal biologic medication used in the treatment of uveitis. This is a potent immunosuppressant medication, and as such runs the risk of increased susceptibility to infections. Based on data analysis, this is most commonly respiratory infections, but can also include all types of infection, including reactivation of latent tuberculosis if present. As such, we recommend aggressive screening for tuberculosis as well as viral hepatitis. This medication also comes with a black box warning for the possibility of maligancy. It is unclear whether this is a true causal relationship, as many of the inflammatory diseases this agent is used in have an increased risk of malignancy as well.  As with any of the biologic medications, there is always risk of allergic reaction, including anaphylaxis. We discussed these risks, and it was agreed that the benefits outweigh the possible risks of medication usage.     We previously discussed the risk and benefit of methotrexate therapy, including the possibility of liver toxicity. We will monitor her liver functions in every month for the first couple months followed by every  three months thereafter, we also discussed that pulmonary hypersensitivity reactions may have been at times, including lung toxicities and she was educated on signs and symptoms such as fever, persistent cough, and shortness of breath.Due to potential lung toxicity with this medication, baseline CXR should be on file.  There is also a risk of depression of the blood cell count. There is an increased risk of infection with this medication as well, particularly respiratory infections, we discussed this at length. We also discussed the risk of GI upset and the development of oral ulcers. It was explained in detail that folic acid was needed daily to help avoid many of these side effects.  Methotrexate is also an abortifacient medication with severe teratogenicity. This was discussed in detail as a risk. Should pregnancy occur, this medication would be extremely toxic to the fetus.  While on methotrexate, sulfa based drugs including but not limited to Bactrim, should be avoided due to potential toxicity.     She previously tried plaquenil (tried 2 different instances) but had rash/allergic reaction both times.   She may benefit from a low dose gabapentin vs low dose prednisone as recommended by neuro. Will consider this after monitoring her response to getting inflammation under control     Patient verbalized understanding of above instructions. No further questions at this time.    Code selection for this visit was based on time spent (30min) on date of service in preparing to see the patient, obtaining and/or reviewing separately obtained history, performing a medically appropriate examination, counseling and educating the patient/family/caregiver, ordering medications or testing, referring and communicating with other healthcare providers, documenting clinical information in the E HR, independently interpreting results and communicating results to the patient/family/caregiver and care coordination with the patient's  other providers. In addition to multiple mychart messages prior to today's visit.   ?  Plan:  Diagnoses and all orders for this visit:    Undifferentiated connective tissue disease (HCC)  -     z Insight MRI HIPS, RIGHT (CPT=73721); Future  -     z Insight MRI SHOULDER, LEFT (CPT=73221); Future  -     leucovorin 5 MG Oral Tab; Take one pill 12 hours after methotrexate each week  -     CBC With Differential With Platelet; Future  -     Comp Metabolic Panel (14); Future  -     C-Reactive Protein; Future  -     Sed Rate, Westergren (Automated); Future  -     Quantiferon TB Plus; Future  -     Methotrexate Sodium 50 MG/2ML Injection Solution; Inject 0.8 mL (20 mg total) into the skin every 7 days.  -     Syringe/Needle, Disp, 25G X 5/8\" 1 ML Does not apply Misc; To be used for Methotrexate injection weekly    Immunocompromised state due to drug therapy (HCC)  -     z Insight MRI HIPS, RIGHT (CPT=73721); Future  -     z Insight MRI SHOULDER, LEFT (CPT=73221); Future  -     leucovorin 5 MG Oral Tab; Take one pill 12 hours after methotrexate each week  -     CBC With Differential With Platelet; Future  -     Comp Metabolic Panel (14); Future  -     C-Reactive Protein; Future  -     Sed Rate, Westergren (Automated); Future  -     Quantiferon TB Plus; Future    High risk medication use  -     z Insight MRI HIPS, RIGHT (CPT=73721); Future  -     z Insight MRI SHOULDER, LEFT (CPT=73221); Future  -     leucovorin 5 MG Oral Tab; Take one pill 12 hours after methotrexate each week  -     CBC With Differential With Platelet; Future  -     Comp Metabolic Panel (14); Future  -     C-Reactive Protein; Future  -     Sed Rate, Westergren (Automated); Future  -     Quantiferon TB Plus; Future    Chronic left shoulder pain  -     z Insight MRI SHOULDER, LEFT (CPT=73221); Future    Adhesive capsulitis of left shoulder  -     z Insight MRI SHOULDER, LEFT (CPT=73221); Future    Long-term use of adalimumab  -     z Insight MRI HIPS, RIGHT  (CPT=73721); Future    Chronic fatigue    Encounter for smoking cessation counseling    Elevated sed rate  -     z Insight MRI HIPS, RIGHT (CPT=73721); Future  -     methylPREDNISolone (MEDROL) 4 MG Oral Tablet Therapy Pack; As directed.  -     amoxicillin clavulanate 875-125 MG Oral Tab; Take 1 tablet by mouth 2 (two) times daily for 10 days.    History of iritis    Chronic right hip pain  -     z Insight MRI HIPS, RIGHT (CPT=73721); Future    Left non-suppurative otitis media  -     methylPREDNISolone (MEDROL) 4 MG Oral Tablet Therapy Pack; As directed.  -     amoxicillin clavulanate 875-125 MG Oral Tab; Take 1 tablet by mouth 2 (two) times daily for 10 days.    Screening for tuberculosis  -     Quantiferon TB Plus; Future    Polyarthralgia  -     Methotrexate Sodium 50 MG/2ML Injection Solution; Inject 0.8 mL (20 mg total) into the skin every 7 days.  -     Syringe/Needle, Disp, 25G X 5/8\" 1 ML Does not apply Misc; To be used for Methotrexate injection weekly                        Return in about 3 months (around 10/9/2025).  ?  HPI   Amelie Gonzalez is a 45 year old female with the following active problems who was seen as a new patient for evaluation of pain and fatigue.  She presents for follow up today.   (She brought her two children today)     Since her last visit, she has been doing okay.  Still having significant left shoudler pain and sensation like ripping when opening the door. Also now soreness in the right shoulder  Has some worsened right side/abd/hip pain. Sore in the groin and outside. Pain present when sitting, walking/moving fast. Feels constant pain but worsens with certain movements. Pain when laying on that side.     GI seems stable- still fluctuating with constipation and cramping/frequent stools.  Still with nausea without vomiting.   States methotrexate injection \"takes her out\" significant fatigue for the following 24hours.     Hand and feet pain stable and manageable. Some days are worse  than others- can be hard to work/tiresome.   Denies major flares except a few weeks ago- attributes to heat/weather    Denies recent rashes except folliculitis which she applies a cream through derm    Had to switch per insurance to Hyrimoz from her Humira in the spring of 2024. Previously didn't feel helping as much as Humira but tolerating and feels okay now. Feels worse between injections. Does feel better after injecting those.   Denies iritis lately   Some shortness of breath lately (last 1- 1.5 weeks) but no recent formal bronchitis.   Last medrol dose pack at the end of May due to worsened joint pain in the hands, arms and knees     States morning stiffness not bad right now, but feels worse after sitting in car for prolonged periods (30min to work in the AM)     + dry eyes, just using artifical tears. Hasn't started xiidra if the dryness worsens, allergic to plugs previously over 10 years ago.   + dry mouth   Denies recent oral and nasal ulcers  + livedo reticularis, stable and intermittent     Still smoking 1/2 ppd- stable from last time.         HPI from initial consultation  referred for rheumatologic evaluation due to second opinion.      States she first started to have problems about 2018. Thought she was getting lymph nodes enlarged in the neck. A rash on the forehead that looked like blisters. Both resolved on it's own.   Then developed livedo reticularis appearance over the legs.   Was seen by rheumatology and initially labs were normal so no intervention started initially.    Has been suffering from GI issues- abdominal pain/cramping, diarrhea without blood in stools, nausea without vomiting. States she has been told colitis in the past. Has been chronic over the past few years.     Also rash under her breasts that lasted several months. Eventually went away on it's own.     After covid vaccination in Feb (2021), had worsened pain in her face. Has had repeat oral ulcerations. Has been seen by ENT  and dentist. Had biopsy and told possible lichen planus. Has tried different ointments as well as mouth washes without improvement.     Had worsened hand swelling. When she followed with rheumatology again, her Queen antibody was positive but EVIN negative. She was started on plaquenil, and unclear if face rash started due to medication since rash still present intermittently. Happens randomly per pt, not necessarily with sun exposure.     Had worsened abdominal pain and dx with liver lesion, underwent surgical removal on 10/11. Does have improvement of the abdominal pain. Diagnosed with Von Meyenburg complex    + hx of sepsis from renal stones (2014)  + hair loss/thinning  + can get ulcer in the nose   + pain located in wrists b/l (was wearing braces in August), b/l knees. Can get swelling in the hand and knees. Swelling diffuse over the hands, not just over the joints.   + hx of one miscarriage at 12w, able to conceive after that. No other pregnancy complications.   + GERD on medication, controlled   + trouble swallowing, had swallow study done last year and full esophagram was normal   + dry eyes, uses artifical tears and gel at night, has tried plugs before but had allergic reaction to the material.  + dry mouth, not severe. No recurrent cavities.   + achilles pain with sometimes swelling, particularly when first getting up in the mornings   + abnormal fingernails- ridging and peeling   + possible hx of dactylitis about 2019- left ring finger; no recurrence since that time but has residual itching.   + chronic sinus disease; epistaxis in the arcos  + chronic cough  + smoker since age 15; has wellbutrin to try to help quit    The patient denies elevated or scarring rashes, Raynaud's phenomenon, prior hematologic abnormality, prior renal disease (aside from the kidney stones), or history of seizures.  No history of prior blood clot in the legs or lungs, strokes or ischemic phenomenon.  Denies nonhealing ulcers on  the fingertips.   The patient denies any history of uveitis, nodular painful shin bruises, psoriatic lesions, or history of dactylitis.    Works as opto technician.     Family hx:   Father had psoriatic arthritis on methotrexate and prednisone  Twin sister (identical) getting worked up for psoriatic arthritis vs mctd, has jo1+    Previously seen by [facility] for second opinion to be evaluated for scleroderma- was told unlikely scleroderma but recommended that she be monitored for lupus and should get further work up for IBD.     Past Medical History:  Past Medical History:    Abdominal distention    Abdominal hernia    ABDOMINAL PAIN    mild cramping    Abdominal pain    Anxiety    Arthritis    Atypical mole    Back pain    Bad breath    Belching    Benign neoplasm of rectum    Bloating    Blood in the stool    Calculus of kidney    Cardiomegaly    CERVICAL DYSPLASIA    Change in hair    Chest pain    Constipation    Decorative tattoo    DEPRESSION    Diabetes mellitus (HCC)    gestational    Diarrhea, unspecified    Endometriosis    Enlarged lymph node    Fatigue    Flatulence/gas pain/belching    Food intolerance    Frequent urination    Frequent UTI    GERD    GERD (gastroesophageal reflux disease)    Gestational diabetes (HCC)         Headache disorder    Hearing loss    Heartburn    Hemorrhoids    History of depression    IBS (irritable bowel syndrome)    Indigestion    Irregular bowel habits    Kidney disease    Leaking of urine    Migraines    Mouth sores    Nausea    Night sweats    OBESITY    Pain in joints    Pain with bowel movements    Painful swallowing    PONV (postoperative nausea and vomiting)    Problems with swallowing    Rash    Skin blushing/flushing    Sleep disturbance    Stool incontinence    Thyroid nodule    Ulcer     Past Surgical History:  Past Surgical History:   Procedure Laterality Date       and     Cholecystectomy      Colonoscopy       Colposcopy,bx cervix/endocerv curr  2003    moderate dysplasia    Cysto/uretero w/lithotripsy Right 09/02/2014    Procedure: LITHOTRIPSY HOLMIUM LASER WITH CYSTOSCOPY;  Surgeon: Thierno Tovar DO;  Location: [facility]    Cystouretro w/stone remove Right 09/02/2014    Procedure: CYSTOSCOPY WITH RETROGRADE PYELOGRAM;  Surgeon: Thierno Tovar DO;  Location: [facility]    D & c  09/14/2012    Egd      Fragmenting of kidney stone Right 08/19/2014    Procedure: LITHOTRIPSY WITH CYSTOSCOPY, STENT PLACEMENT;  Surgeon: Thierno Tovar DO;  Location: [facility]    Hysterectomy  10/2022    Leep  2003    Lysis of adhesions      Deisree localization wire 1 site right (cpt=19281)      age 26 had Right biopsyexciosnal; fibrocystic tissue    Needle biopsy liver      Other surgical history  2005    Rt breast biopsy    Other surgical history  08/19/2014    Right ESWL- Dr. Webb    Other surgical history  09/02/2014    Right ESWL- Dr. Tovar    Other surgical history  09/08/2014    Cysto Stent Removal- Dr. Tovar    Tonsillectomy  1990    Total abdom hysterectomy      X-ray retrograde pyelogram Right 09/02/2014    Procedure: CYSTOSCOPY/URETEROSCOPY/STONE EXTRACTION;  Surgeon: Thierno Tovar DO;  Location: [facility]     Family History:  Family History   Problem Relation Age of Onset    Other (psoriatic arthritis) Father     Colon Polyps Mother     Lipids Mother     Heart Disorder Mother     Hypertension Mother     Diabetes Mother     Breast Cancer Mother 70    Cancer Mother     Colon Polyps Sister     Other (psoriatic arthritis) Sister     Other (mctd) Sister     Ovarian Cancer Paternal Aunt     Colon Polyps Sister      Social History:  Social History     Socioeconomic History    Marital status:    Tobacco Use    Smoking status: Every Day     Current packs/day: 1.00     Average packs/day: 1 pack/day for 15.0 years (15.0 ttl pk-yrs)     Types:  Cigarettes    Smokeless tobacco: Never   Vaping Use    Vaping status: Never Used   Substance and Sexual Activity    Alcohol use: Not Currently    Drug use: No    Sexual activity: Yes     Partners: Male     Social Drivers of Health      Received from [facility]     Medications:  Outpatient Medications Marked as Taking for the 7/9/25 encounter (Office Visit) with Maggie Sy DO   Medication Sig Dispense Refill    Methotrexate Sodium 50 MG/2ML Injection Solution Inject 0.8 mL (20 mg total) into the skin every 7 days. 12 mL 0    Syringe/Needle, Disp, 25G X 5/8\" 1 ML Does not apply Misc To be used for Methotrexate injection weekly 15 each 0    clindamycin 1 % External Lotion APPLY 2 TIMES A DAY TO BUMPS ON SCALP, CHEST, AND BACK AS NEEDED.      methylPREDNISolone (MEDROL) 4 MG Oral Tablet Therapy Pack As directed. 1 each 0    amoxicillin clavulanate 875-125 MG Oral Tab Take 1 tablet by mouth 2 (two) times daily for 10 days. 20 tablet 0    leucovorin 5 MG Oral Tab Take one pill 12 hours after methotrexate each week 12 tablet 0    ACIPHEX 20 MG Oral Tab EC Take 1 tablet (20 mg total) by mouth daily.      cyclobenzaprine 5 MG Oral Tab Take 1 tablet (5 mg total) by mouth 2 (two) times daily as needed for Muscle spasms. 60 tablet 0    Adalimumab-adaz (HYRIMOZ) 40 MG/0.4ML Subcutaneous Solution Auto-injector INJECT 1 PEN UNDER THE SKIN EVERY 14 DAYS 6 each 1    desvenlafaxine  MG Oral Tablet 24 Hr Take 1 tablet (100 mg total) by mouth daily.      fluticasone propionate 50 MCG/ACT Nasal Suspension 2 sprays by Nasal route daily.      loratadine 10 MG Oral Tab Take 1 tablet (10 mg total) by mouth daily.      hydrocortisone 2.5 % External Ointment       olopatadine 0.1 % Ophthalmic Solution INSTILL 1 DROP BY OPHTHALMIC ROUTE 2 TIMES EVERY DAY INTO BOTH EYES AS NEEDED      hyoscyamine 0.125 MG Sublingual SL Tab Place 1 tablet (125 mcg total) under the tongue every 6 (six) hours as needed for Cramping. 120  tablet 0    Albuterol Sulfate  (90 Base) MCG/ACT Inhalation Aero Soln Inhale 2 puffs into the lungs every 6 (six) hours as needed.      LOPERAMIDE HCL OR Take by mouth as needed.       Modified Medications    Modified Medication Previous Medication    METHOTREXATE SODIUM 50 MG/2ML INJECTION SOLUTION METHOTREXATE SODIUM 50 MG/2ML Injection Solution       Inject 0.8 mL (20 mg total) into the skin every 7 days.    INJECT 0.8 MLS AS DIRECTED ONCE A WEEK    SYRINGE/NEEDLE, DISP, 25G X 5/8\" 1 ML DOES NOT APPLY MISC Syringe/Needle, Disp, 25G X 5/8\" 1 ML Does not apply Misc       To be used for Methotrexate injection weekly    To be used for Methotrexate injection weekly     Medications Discontinued During This Encounter   Medication Reason    methylPREDNISolone (MEDROL) 4 MG Oral Tablet Therapy Pack     folic acid 1 MG Oral Tab     Syringe/Needle, Disp, 25G X 5/8\" 1 ML Does not apply Misc     METHOTREXATE SODIUM 50 MG/2ML Injection Solution        ?  ?  Allergies:  Allergies   Allergen Reactions    Aluminum RASH and OTHER (SEE COMMENTS)     Ulcers on skin    Azithromycin ANGIOEDEMA and SWELLING    Biaxin [Clarithromycin] TONGUE SWELLING    Clarithromycin ANGIOEDEMA, SWELLING and TONGUE SWELLING     Tongue swells and develops sores in mouth  Tongue swells    Tongue swells and develops sores in mouth    Duloxetine MYALGIA and OTHER (SEE COMMENTS)     Vomiting and jaw lock    Vomiting and jaw lock  Other reaction(s): Myalgias (muscle pain)  Vomiting and jaw lock    Erythromycin ANGIOEDEMA and SWELLING    Nickel OTHER (SEE COMMENTS) and RASH     Skin ulcer.    Skin ulcer.  Skin ulcer.    Duloxetine Hcl NAUSEA AND VOMITING    Nickel RASH     Also reaction to aluminum- enviornmental      Silver UNKNOWN    Hydroxychloroquine RASH    Sulfa Antibiotics RASH     ?  REVIEW OF SYSTEMS   ?  Review of Systems   Constitutional:  Positive for malaise/fatigue. Negative for chills, fever and weight loss.   HENT:  Positive for ear  pain (pressure), hearing loss and tinnitus.         Left sided   Eyes:  Positive for photophobia. Negative for blurred vision, double vision, pain and redness.   Respiratory:  Positive for cough and shortness of breath. Negative for hemoptysis and wheezing.    Cardiovascular:  Positive for chest pain (told costochondritis, intermittently), palpitations (increased the past few weeks) and leg swelling (in the feet).   Gastrointestinal:  Positive for constipation, heartburn and nausea. Negative for abdominal pain, blood in stool and diarrhea.   Genitourinary:  Negative for dysuria, frequency, hematuria and urgency.   Musculoskeletal:  Positive for back pain, joint pain (but some hand swelling), myalgias and neck pain.   Skin:  Negative for itching and rash.   Neurological:  Positive for tingling (right hand) and headaches (intermittently). Negative for dizziness and weakness.   Endo/Heme/Allergies:  Positive for environmental allergies. Bruises/bleeds easily.   Psychiatric/Behavioral:  Positive for depression. The patient is nervous/anxious and has insomnia.         Feeling better overall      PHYSICAL EXAM   Today's Vitals:  Temperature Blood Pressure Heart Rate Resp Rate SpO2   Temp: 98.2 °F (36.8 °C) BP: 128/80 Pulse: 94 Resp: 16 SpO2: 98 %   ?  Current Weight Height BMI BSA Pain   Wt Readings from Last 1 Encounters:   07/09/25 195 lb (88.5 kg)    Height: 5' 1\" (154.9 cm) Body mass index is 36.84 kg/m². Body surface area is 1.87 meters squared.         Physical Exam  Vitals and nursing note reviewed.   Constitutional:       General: She is not in acute distress.     Appearance: Normal appearance. She is well-developed. She is not diaphoretic.   HENT:      Head: Normocephalic.      Right Ear: External ear normal. No tenderness. No middle ear effusion. There is no impacted cerumen. Tympanic membrane is not perforated or erythematous.      Left Ear: External ear normal. Tenderness present. A middle ear effusion is  present. There is no impacted cerumen. Tympanic membrane is erythematous. Tympanic membrane is not perforated.   Eyes:      General: No scleral icterus.     Extraocular Movements: Extraocular movements intact.      Conjunctiva/sclera: Conjunctivae normal.   Neck:      Vascular: No JVD.      Trachea: No tracheal deviation.   Cardiovascular:      Rate and Rhythm: Normal rate and regular rhythm.      Heart sounds: No murmur heard.  Pulmonary:      Effort: Pulmonary effort is normal. No respiratory distress.      Breath sounds: Normal breath sounds. No wheezing.   Musculoskeletal:         General: Tenderness and deformity present. No swelling.      Cervical back: Neck supple.      Comments: Diffuse tenderness- particularly over Mcps and dorsum of hands and b/l wrist - improved overall  No swelling, redness or restriction of motion of the DIPs, PIPs, MCPs, wrists, elbows, ankles, or joints of the feet except right mid foot  Post surgical changes noted over distal right foot  Puffiness over index/middle fingers MCPs to PIPs but not over joints.   Right shoulder grossly normal.   Left shoulder limited  +Ravi -Jobs +inability to put in Crass  Bilateral knees with medial joint line tenderness, no crepitus, no effusion.  Medial distal knee tenderness- stable  Right hip- +straight leg test; + tenderness along lateral and posterior aspect +inability to externally rotate or ANGELINE   Lymphadenopathy:      Cervical: No cervical adenopathy.   Skin:     General: Skin is warm and dry.      Findings: No erythema or rash.      Comments: No periungal erythema   No skin tightening noted over fingers/hands  No malar rash  Livedo reticularis (not as bad today)   Neurological:      Mental Status: She is alert and oriented to person, place, and time.      Cranial Nerves: No cranial nerve deficit.      Gait: Gait normal.   Psychiatric:         Mood and Affect: Mood normal.         Behavior: Behavior normal.       ?  Radiology review:  CT  ABDOMEN+PELVIS(CONTRAST ONLY)(CPT=74177)  Result Date: 7/5/2025  CONCLUSION: No acute abnormality in the abdomen or pelvis. No findings to explain abdominal pain or right hip pain. Suspected small nonobstructing renal stones. No hydronephrosis. No bowel obstruction. The appendix is unremarkable. The gallbladder is surgically absent. No biliary ductal dilatation. No CT evidence of acute pancreatitis. No acute fracture. Electronically Verified and Signed by Attending Radiologist: Cristobal Mendoza MD 7/5/2025 12:55 PM Workstation: [id number]     MRI's reviewed and to be scanned into chart.       PROCEDURE: CT ABDOMEN TRIPHASIC LIVER (W+WO)(CPT=74170)       COMPARISON: [facility], CT APPENDIX ABD PEL W CONTRAST (WLU=44351), 9/10/2021, 1:56 PM.  [facility], MRI LIVER (W+WO) (CPT=74183), 8/25/2021, 12:47 PM.       INDICATIONS: History of cystic liver lesion, status post surgical resection of segment VIII (10/11/2021).       TECHNIQUE: Multidetector CT images of the abdomen were obtained without and with non-ionic intravenous contrast material (in the arterial and portal venous phases). Automated exposure control for dose reduction was used. Adjustment of the mA and/or kV   was done based on the patient's size. Iterative reconstruction technique for dose reduction was employed. Water was ingested immediately prior to image acquisition; no oral contrast was ingested.       FINDINGS:   LUNG BASES: The heart is normal in size. There is dependent subsegmental atelectasis bilaterally.   LIVER: Postoperative changes are suggested with demonstration of a 2.7 x 1.9 x 3.6 cm residual hypodensity in segment VIII, intervally decreased from 7.6 x 6.9 x 6.5 cm previously; this has lobulated contours. Overlying capsular retraction is seen   extending to the right hemidiaphragmatic leaflet. No definite enhancement is identified.   Additional well-circumscribed cystic lesions are present, not  significantly changed from prior exams.   BILIARY: The gallbladder is surgically absent with cholecystectomy clips in the gallbladder fossa. Extrahepatic biliary dilatation measures up to 0.8 cm.   PANCREAS: No lesion, fluid collection, ductal dilatation, or atrophy.     SPLEEN: No enlargement.     ADRENALS:   No defined mass or abnormal enlargement.     KIDNEYS:   Bilateral renal calculi are apparent. No ureteral calculi are identified in the imaged volume. There is no hydronephrosis or hydroureter. No perinephric or periureteric fat stranding is appreciated. Lobulated renal contours are seen,   suggestive of multifocal renal cortical scarring. Multiple well circumscribed renal hypodensities are present and are not completely characterized, but statistically likely represent cysts. No suspicious enhancing renal lesions are evident. The   visualized portions of the ureters are normal in course and caliber without visualized filling defects on excretory phase imaging.   GI/MESENTERY: A small hiatal hernia is evident. Mild mucosal enhancement of the terminal ileum is suggested. There is no evidence of bowel obstruction. A normal caliber appendix is seen without inflammatory manifestations.     VASCULATURE:   No aneurysm is detected.   RETROPERITONEUM: No mass or lymphadenopathy is apparent.     BONES:   Multilevel degenerative changes are seen throughout the spine.   ABDOMINAL WALL: There is a minute fat-containing umbilical hernia superimposed on slight periumbilical diastasis.   OTHER: No free air or fluid is seen in the abdomen.                         Impression   CONCLUSION:   1. Postprocedural changes of partial cyst resection in segment VIII. The previously seen cystic lesion has partially involuted. No suspicious features are identified.       2. Additional scattered cystic hepatic lesions appear grossly stable.       3. Nonspecific mild mucosal enhancement of the terminal ileum, perhaps indicative of  underlying ileitis. Correlation for relevant symptomatology is suggested.       4. Bilateral renal calculi are seen without evidence of obstructive uropathy.       5. Renal cortical defects, particular on the right, may reflect renal cortical scarring.       6. Status post cholecystectomy with mild extrahepatic biliary dilatation, perhaps related to the postcholecystectomy state.       7. Lesser incidental findings as above.         Dictated by (CST): Reinaldo Wilkes MD on 12/01/2021 at 2:44 PM       Finalized by (CST): Reinaldo Wilkes MD on 12/01/2021 at 2:56 PM             PROCEDURE: MRI LIVER (W+WO) (CPT=74183)       COMPARISON: None.       INDICATIONS: Liver cyst. Follow up.       TECHNIQUE: A comprehensive MRI examination of the abdomen was performed utilizing a variety of imaging planes and imaging parameters to optimize visualization of suspected pathology.  Images were obtained both before and after intravenous gadolinium   injection.         FINDINGS:   LIVER: There is a lobulated T2 hyperintense lesion which appears to be centered in the hepatic dome segment 8 measuring 7.0 x 7.6 x 5.8 cm. This may have some thin internal septation but otherwise no evidence of significant enhancement.  There are   multiple additional scattered smaller cysts seen throughout the liver.  There is a 0.7 cm T2 hyperintense lesion in the left lobe segment 2. This demonstrates homogeneous arterial enhancement which persists on delayed sequences and most likely reflects a    small flash filling meningioma.   GALLBLADDER: Gallbladder surgically absent.   BILIARY TREE: Common bile duct measures 0.8 cm with distal gradual tapering.  No intrahepatic biliary ductal dilatation.  There is low insertion of a cystic duct remanent noted which is not significantly dilated.   PANCREAS: Unremarkable.   SPLEEN: Unremarkable.   ADRENALS: Unremarkable.   KIDNEYS: Enhance symmetrically without hydronephrosis.  Cortical scarring noted in the  interpolar right kidney.  A subcentimeter right lower pole renal cyst noted.   VASCULATURE:             Abdominal aorta is normal in caliber.  The main portal vein is patent.   LYMPHADENOPATHY: None.     BOWEL: Visualized bowel is nondilated.   BONES: Suboptimally evaluated but marrow signal is grossly unremarkable.   LUNG BASES: No significant finding.   OTHER: Negative.                     Impression   CONCLUSION:       7.6 cm cyst hepatic dome as described above with additional smaller scattered hepatic cysts.       Probable subcentimeter left hepatic flash filling hemangioma.       Cholecystectomy.       Mildly dilated common bile duct with distal gradual tapering.  Findings may reflect post cholecystectomy change.  Advise appropriate laboratory correlation.       Small right renal cyst.               Dictated by (CST): Manny Sanchez MD on 8/25/2021 at 1:50 PM       Finalized by (CST): Manny Sanchez MD on 8/25/2021 at 2:12 PM        HISTORY: Persistent cough for 2 months.  Occasional shortness of breath.     TECHNIQUE:  2 view chest.     COMPARISON: 9/6/2014.     FINDINGS/   IMPRESSION:   Equivocal minor peribronchial thickening.  No discrete focal airspace consolidation or pleural effusion.     No overt enlargement of the cardiac silhouette.     FINAL REPORT   Attending Radiologist:  Maverick Irving MD   Date Signed Off:  12/26/2020 16:18          Labs:  Lab Results   Component Value Date    WBC 7.5 07/08/2025    RBC 4.40 07/08/2025    HGB 13.3 07/08/2025    HCT 40.3 07/08/2025    .0 07/08/2025    MCV 91.6 07/08/2025    MCH 30.2 07/08/2025    MCHC 33.0 07/08/2025    RDW 13.8 07/08/2025    NEPRELIM 3.82 07/08/2025    NEPERCENT 51.0 07/08/2025    LYPERCENT 37.1 07/08/2025    MOPERCENT 9.5 07/08/2025    EOPERCENT 1.6 07/08/2025    BAPERCENT 0.7 07/08/2025    NE 3.82 07/08/2025    LYMABS 2.78 07/08/2025    MOABSO 0.71 07/08/2025    EOABSO 0.12 07/08/2025    BAABSO 0.05 07/08/2025     Lab Results    Component Value Date    GLU 96 07/08/2025    BUN 15 07/08/2025    BUNCREA 18.8 07/08/2025    CREATSERUM 0.80 07/08/2025    ANIONGAP 7 07/08/2025    GFR >59 12/04/2010    GFRNAA 93 08/01/2022    GFRAA 108 08/01/2022    CA 9.3 07/08/2025    OSMOCALC 285 07/08/2025    ALKPHO 79 07/08/2025    AST 19 07/08/2025    ALT 20 07/08/2025    BILT 0.2 (L) 07/08/2025    TP 6.7 07/08/2025    ALB 4.5 07/08/2025    GLOBULIN 2.2 07/08/2025    AGRATIO 1.8 12/04/2010     07/08/2025    K 3.8 07/08/2025     07/08/2025    CO2 23.0 07/08/2025       Additional Labs:  07/2025  ESR 22 elevated  CRP normal  Vit D 31.8 normal     11/2024  TB negative  ESR 14 normal  CRP normal  CMP grossly normal  CBC grossly normal    07/2024  Adalimumab antibody negative  Adalimumab level 17  ESR 24 borderline elevated  CRP normal  CMP grossly normal  CBC grossly normal    04/2024  CRP normal  ESR 24 borderline  CMP grossly normal  CBC grossly normal  Iron studies normal     01/2024  CRP normal  ESR 21 borderline  CMP grossly normal  CBC grossly normal    11/2023  HLA-B27 negative  ESR 39 elevated  CRP 0.5 normal  CMP grossly normal  CBC grossly normal  HCV nonreactive  Hep B core antibody, IgM, total nonreactive  Hepatitis surface antibody reactive  QuantiFERON-TB negative    08/2023  CBC with WBC 3.4 borderline low  CRP normal  ESR 37 elevated    09/2023  CRP 0.42 borderline   ESR 20 normal     08/2023  CRP normal  ESR 25 elevated  B12 448 normal     07/2023  ESR 33 borderline  CRP 0.32 borderline  CBC grossly normal  CMP grossly normal    06/2023  ESR 29 borderline  CRP 0.32 borderline  CBC grossly normal  CMP grossly normal  Vit D 24.7 low    04/2023  ESR 44 elevated  CRP 0.82 elevated    01/2023  IgA normal  U1 RNP negative  RNP 70 negative  ESR 16 normal  CRP 0.34 borderline  CMP grossly normal  CBC grossly normal  Vit D 25.8 low    11/11/2022  ESR 31 elevated  CRP 0.69 elevated  CMP grossly normal  CBC grossly normal    08/2022  Vit D  24.4 low  ESR 13  CRP 0.59 borderline   RNA polymerase III 59 moderate positive  EVIN by IFA negative    04/2022  EVIN/EVIN panel negative  ESR 16 normal  CRP 0.43 borderline  RNA polymerase III 64 moderate positive  EVIN by IFA negative    02/2022  EVIN by IFA  CRISTELA Panel negative  CRP 0.54 elevated   ESR 16 normal   RNA virginia III 60 moderate positive     10/2021  ANCA negative (MPO/PR3) negative  ESR 15 normal   CRP 0.31  Cryoglobulin negative   SPEP grossly negative   CBC grossly normal   CMP grossly normal   IgA, IgG, IgM normal   Total complement normal   B12 525 normal  AVISE grossly negative with exception of equivocal RNA virginia III    04/2021  CK normal  C3 152 normal  C4 24.1 normal  dsDNA negative  SSA SSB negative  Smith moderate positive  Queen/RNP moderate positive  EVIN by IFA negative    08/2020  Vitamin D 32.5  B12 384  ESR 7 normal  CRP 0.37 borderline    04/2019  B2 G negative  RF negative  CCP negative  EBV IgG, AG ABS positive; IgA negative  MPO, WV-3, ANCA negative  SPEP grossly normal  Cryoglobulin negative  LAC negative  ACL negative  HIV negative  HCV nonreactive  Hepatitis B serologies normal    Maggie Yossi, DO  [facility]  07/09/2025

## 2025-07-10 RX ORDER — METHOTREXATE 25 MG/ML
0.8 INJECTION, SOLUTION INTRAMUSCULAR; INTRATHECAL; INTRAVENOUS; SUBCUTANEOUS
Qty: 12 ML | Refills: 0 | Status: SHIPPED | OUTPATIENT
Start: 2025-07-10

## 2025-07-14 LAB
ADALIMUMAB LVL: 15 UG/ML
ANTI-ADALIMUMAB AB: <25 NG/ML

## (undated) DEVICE — DRAPE ARM 21X19X10.5IN EQUIPMENT DA VINCI XI 21LB

## (undated) DEVICE — SUTURE SILK 2-0 SH

## (undated) DEVICE — BLADELESS OBTURATOR: Brand: WECK VISTA

## (undated) DEVICE — GLOVE SURG 7 PROTEXIS LF CRM PF BEAD CUFF STRL PLISPRN 12IN

## (undated) DEVICE — HEMOSTAT ABS 4X4IN NONWOVEN SURGICEL SNOW

## (undated) DEVICE — SOL  .9 1000ML BTL

## (undated) DEVICE — CRADLE PSTN DEVON ARM FOAM LMI LF

## (undated) DEVICE — PROGRASP FORCEPS: Brand: ENDOWRIST

## (undated) DEVICE — SEAL

## (undated) DEVICE — GLOVE SURG 7.5 PREMIERPRO LF NATURAL PF TXTR SMTH STRL

## (undated) DEVICE — CANNULA SEAL

## (undated) DEVICE — ENDOSCOPIC LINEAR CUTTER RELOADS GRAY 2.0MM, 6 ROWS: Brand: ECHELON ENDOPATH

## (undated) DEVICE — GOWN SURG XL L3 NONREINFORCE SET IN SLV STRL LF DISP BLUE

## (undated) DEVICE — NEEDLE HPO 22GA 1.5IN REG WALL REG BVL LL SHLD MECH DEHP-FR

## (undated) DEVICE — MEGA SUTURECUT ND: Brand: ENDOWRIST

## (undated) DEVICE — Device

## (undated) DEVICE — AIRSEAL 5 MM ACCESS PORT AND LOW PROFILE OBTURATOR WITH BLADELESS OPTICAL TIP, 120 MM LENGTH: Brand: AIRSEAL

## (undated) DEVICE — GLOVE SURG 6.5 PREMIERPRO LF NATURAL PF TXTR SMTH STRL

## (undated) DEVICE — PORT HAND ACC 120MM BLDLS OPTC TIP LOWPRFL OBT AIRSEAL 8MM

## (undated) DEVICE — EXOFIN TISSUE ADHESIVE 1.0ML

## (undated) DEVICE — VESSEL SEALER EXTEND: Brand: ENDOWRIST

## (undated) DEVICE — SLRDIVR LAPSCP 37CM LGSR 350D 18.5MM MARYLAND CRV JAW NANO

## (undated) DEVICE — GAMMEX® PI HYBRID SIZE 7, STERILE POWDER-FREE SURGICAL GLOVE, POLYISOPRENE AND NEOPRENE BLEND: Brand: GAMMEX

## (undated) DEVICE — SUTURE VICRYL 0 J906G

## (undated) DEVICE — TRI-LUMEN FILTERED TUBE SET WITH ACTIVATED CHARCOAL FILTER: Brand: AIRSEAL

## (undated) DEVICE — INTENDED TO BE USED TO OCCLUDE, RETRACT AND IDENTIFY ARTERIES, VEINS, TENDONS AND NERVES IN SURGICAL PROCEDURES: Brand: STERION®  VESSEL LOOP

## (undated) DEVICE — COVER STAND 57X30IN PRXM MAYO XL SMS STRL LF DISP

## (undated) DEVICE — COLUMN DRAPE

## (undated) DEVICE — INSUFFLATION NEEDLE TO ESTABLISH PNEUMOPERITONEUM.: Brand: INSUFFLATION NEEDLE

## (undated) DEVICE — GLOVE SURG 6.5 PROTEXIS LF BLUE PF SMTH BEAD CUFF INTLK STRL

## (undated) DEVICE — ROBOTIC: Brand: MEDLINE INDUSTRIES, INC.

## (undated) DEVICE — PERMANENT CAUTERY HOOK: Brand: ENDOWRIST

## (undated) DEVICE — OBTURATOR LAPSCP 8MM WECK VISTA DISP BLDLS STRL LF

## (undated) DEVICE — TAPE UMBILICAL U11T

## (undated) DEVICE — SOLUTION ENDOSCOPIC ANTI-FOG NON-TOXIC NON-ABRASIVE 6 CUBIC CENTIMETER WITH RADIOPAQUE ADHESIVE-BACKED SPONGE STERILE NOT MADE WITH NATURAL RUBBER LATEX MEDICHOICE: Brand: MEDICHOICE

## (undated) DEVICE — CANISTER SCT 90D 3000ML DISP LF MDVC GUARDIAN LOCK LID OVFLW

## (undated) DEVICE — REDUCER: Brand: ENDOWRIST

## (undated) DEVICE — CONTAINER SPEC 4OZ 2.5X2.75IN OR POS INDCTR TMPR EVD LEAK

## (undated) DEVICE — SET TBG 3 LUM FLTR AIRSEAL STRL LF ACT CHRCL DISP

## (undated) DEVICE — WATER STRL 1000ML PLASTIC POUR BTL LF

## (undated) DEVICE — MONOPOLAR CURVED SCISSORS: Brand: ENDOWRIST

## (undated) DEVICE — GLOVE SURG 7.5 PROTEXIS LF BLUE PF SMTH BEAD CUFF INTLK STRL

## (undated) DEVICE — ADHESIVE PREMIERPRO EXOFIN 1ML HVISC TUBE SOFT FLXB APL

## (undated) DEVICE — DEVICE V-LOC 180 12IN 0 GS-21 ABS GRN CLSR

## (undated) DEVICE — PROXIMATE SKIN STAPLERS (35 WIDE) CONTAINS 35 STAINLESS STEEL STAPLES (FIXED HEAD): Brand: PROXIMATE

## (undated) DEVICE — UNDYED BRAIDED (POLYGLACTIN 910), SYNTHETIC ABSORBABLE SUTURE: Brand: COATED VICRYL

## (undated) DEVICE — SUTURE PROLENE 4-0 RB-1

## (undated) DEVICE — FENESTRATED BIPOLAR FORCEPS: Brand: ENDOWRIST

## (undated) DEVICE — TIP COVER ACCESSORY

## (undated) DEVICE — SYRINGE 30ML CONC TIP GRAD N-PYRG DEHP-FR STRL MED LF DISP

## (undated) DEVICE — SUTURE ETHILON 3-0 669H

## (undated) DEVICE — GOWN SURG AERO BLUE PERF XLG

## (undated) DEVICE — ARM DRAPE

## (undated) DEVICE — ELECTRO LUBE IS A SINGLE PATIENT USE DEVICE THAT IS INTENDED TO BE USED ON ELECTROSURGICAL ELECTRODES TO REDUCE STICKING.: Brand: KEY SURGICAL ELECTRO LUBE

## (undated) DEVICE — CATHETER SURG 6FR CLPNM OCLDR PNMPRTN SIL RUMI 2 KOH-EFCT

## (undated) DEVICE — SOLUTION IV 1000ML 0.9% NACL PVC PH 5 PLASTIC CNTNR PRSV FR

## (undated) DEVICE — COVER FLXB SEMIRIGID STRL LF DEVON LITEGLOVE LIGHT HNDL

## (undated) DEVICE — 2% CHLORHEXIDINE SKIN PREP ORANGE 26ML

## (undated) DEVICE — SOL  .9 1000ML BAG

## (undated) DEVICE — GLOVE SURG 6 PROTEXIS LF CRM PF BEAD CUFF STRL PLISPRN 11.5

## (undated) DEVICE — 3M™ IOBAN™ 2 ANTIMICROBIAL INCISE DRAPE 6650EZ: Brand: IOBAN™ 2

## (undated) DEVICE — DRAPE CLMN EQUIPMENT DA VINCI XI

## (undated) DEVICE — IRRIGATOR SCT STRKFL 2 STRL LF DISP

## (undated) DEVICE — 40580 - THE PINK PAD - ADVANCED TRENDELENBURG POSITIONING KIT: Brand: 40580 - THE PINK PAD - ADVANCED TRENDELENBURG POSITIONING KIT

## (undated) DEVICE — GOWN SURG LG L3 NONREINFORCE SET IN SLV STRL LF DISP BLUE

## (undated) DEVICE — DRAPE TRANS CL VW STRL

## (undated) DEVICE — NEEDLE HPO 18GA 1.5IN REG WALL REG BVL LL HUB CLR CD DEHP-FR

## (undated) DEVICE — POUCH INST LONG 18X10IN 2 ADH STRIP 3 CMPRT STRL STRDRP

## (undated) DEVICE — MANIPULATOR UT 32MM VCARE + SM CRV CUP

## (undated) DEVICE — TRAY CATH SURESTEP LUBRI-SIL STLK 16FR 350ML FOLEY URMTR

## (undated) DEVICE — SYSTEM IMG CLEARIFY 8X6IN WARM HUB TROCAR WIPE MRFBR DISP

## (undated) DEVICE — TIP ELECTROCAUTERY HOT SHR DA VINCI EWRST 8MM STD CAUT MNPLR

## (undated) DEVICE — SYRINGE 50ML ECNTRC TIP STRL MED LF DISP BD

## (undated) DEVICE — NEEDLE INSFL 14GA 100MM TROCAR BLDLS RADL EXPAND SLV BLUNT

## (undated) DEVICE — SEAL ENDO INST 5-8MM DA VINCI XI UNV

## (undated) DEVICE — SUTURE SILK 2-0 SA85H

## (undated) DEVICE — TOWEL SURG OR 17X30IN BLUE

## (undated) DEVICE — TIP-UP FENESTRATED GRASPER: Brand: ENDOWRIST

## (undated) DEVICE — SUTURE MONOCRYL MTPS 4-0 PS2 18IN MONO ABS UNDYED

## (undated) DEVICE — PAD XL 40X20X1IN PSTN THE PINK PAD 1 LIFT SHT BODY STRAP

## (undated) DEVICE — LAPAROVUE VISIBILITY SYSTEM LAPAROSCOPIC SOLUTIONS: Brand: LAPAROVUE

## (undated) NOTE — Clinical Note
Please send copy of note to pcp. Thanks.      Lawerence [place], DO  [facility]  10/28/2021

## (undated) NOTE — Clinical Note
Please confirm no PA required for us guided ac joint injection (shoulder)  Maggie Sy DO [facility] 4/11/2025

## (undated) NOTE — Clinical Note
Pt wondering about status for PA for injectable methotrexate.   Maggie Sy, DO [facility] 12/4/2024

## (undated) NOTE — LETTER
Patient Name: Willow Schneider  YOB: 1980          MRN number:  [id number]  Date:  10/16/2020  Referring Physician: Nga Wetzel    ADULT VIDEOFLUOROSCOPIC SWALLOWING STUDY:    Referring Physician: Bee Walker      Radiologist: Dr. Joe Suazo Trace retention was observed with puree and solids just below the cricopharyngeus. No other retention of food or liquid in the esophagus. A barium tablet was retained in lower esophagus and was not cleared with multiple drinks of water.   Puree was presen

## (undated) NOTE — LETTER
11/25/20        [address]  [address] [place]      Dear Taco Tucker records indicate that you have outstanding lab work and or testing that was ordered for you and has not yet been completed:    XR UGI/ESOPHAGUS DOUBLE CO